# Patient Record
Sex: FEMALE | Race: WHITE | NOT HISPANIC OR LATINO | Employment: OTHER | URBAN - NONMETROPOLITAN AREA
[De-identification: names, ages, dates, MRNs, and addresses within clinical notes are randomized per-mention and may not be internally consistent; named-entity substitution may affect disease eponyms.]

---

## 2017-04-03 ENCOUNTER — AMBULATORY - GICH (OUTPATIENT)
Dept: INTERNAL MEDICINE | Facility: OTHER | Age: 62
End: 2017-04-03

## 2017-04-03 ENCOUNTER — COMMUNICATION - GICH (OUTPATIENT)
Dept: INTERNAL MEDICINE | Facility: OTHER | Age: 62
End: 2017-04-03

## 2017-05-08 ENCOUNTER — OFFICE VISIT - GICH (OUTPATIENT)
Dept: INTERNAL MEDICINE | Facility: OTHER | Age: 62
End: 2017-05-08

## 2017-05-08 ENCOUNTER — HISTORY (OUTPATIENT)
Dept: INTERNAL MEDICINE | Facility: OTHER | Age: 62
End: 2017-05-08

## 2017-05-08 DIAGNOSIS — K59.00 CONSTIPATION: ICD-10-CM

## 2017-05-08 DIAGNOSIS — F41.0 PANIC DISORDER WITHOUT AGORAPHOBIA: ICD-10-CM

## 2017-05-08 DIAGNOSIS — R22.32 LOCALIZED SWELLING, MASS AND LUMP, LEFT UPPER LIMB: ICD-10-CM

## 2017-05-08 DIAGNOSIS — K64.9 HEMORRHOIDS: ICD-10-CM

## 2017-05-08 DIAGNOSIS — K21.9 GASTRO-ESOPHAGEAL REFLUX DISEASE WITHOUT ESOPHAGITIS: ICD-10-CM

## 2017-05-08 DIAGNOSIS — R22.31 LOCALIZED SWELLING, MASS AND LUMP, RIGHT UPPER LIMB: ICD-10-CM

## 2017-05-08 DIAGNOSIS — G47.00 INSOMNIA: ICD-10-CM

## 2017-06-01 ENCOUNTER — AMBULATORY - GICH (OUTPATIENT)
Dept: SURGERY | Facility: OTHER | Age: 62
End: 2017-06-01

## 2017-06-01 ENCOUNTER — HISTORY (OUTPATIENT)
Dept: SURGERY | Facility: OTHER | Age: 62
End: 2017-06-01

## 2017-06-01 DIAGNOSIS — D17.20 BENIGN LIPOMATOUS NEOPLASM OF SKIN AND SUBCUTANEOUS TISSUE OF EXTREMITY: ICD-10-CM

## 2017-06-01 DIAGNOSIS — R22.31 LOCALIZED SWELLING, MASS AND LUMP, RIGHT UPPER LIMB: ICD-10-CM

## 2017-06-01 DIAGNOSIS — R22.32 LOCALIZED SWELLING, MASS AND LUMP, LEFT UPPER LIMB: ICD-10-CM

## 2017-06-15 ENCOUNTER — HISTORY (OUTPATIENT)
Dept: SURGERY | Facility: OTHER | Age: 62
End: 2017-06-15

## 2017-06-15 ENCOUNTER — OFFICE VISIT - GICH (OUTPATIENT)
Dept: SURGERY | Facility: OTHER | Age: 62
End: 2017-06-15

## 2017-06-15 DIAGNOSIS — D17.20 BENIGN LIPOMATOUS NEOPLASM OF SKIN AND SUBCUTANEOUS TISSUE OF EXTREMITY: ICD-10-CM

## 2017-11-03 ENCOUNTER — HISTORY (OUTPATIENT)
Dept: INTERNAL MEDICINE | Facility: OTHER | Age: 62
End: 2017-11-03

## 2017-11-03 ENCOUNTER — HISTORY (OUTPATIENT)
Dept: RADIOLOGY | Facility: OTHER | Age: 62
End: 2017-11-03

## 2017-11-03 ENCOUNTER — HOSPITAL ENCOUNTER (OUTPATIENT)
Dept: RADIOLOGY | Facility: OTHER | Age: 62
End: 2017-11-03
Attending: INTERNAL MEDICINE

## 2017-11-03 ENCOUNTER — OFFICE VISIT - GICH (OUTPATIENT)
Dept: INTERNAL MEDICINE | Facility: OTHER | Age: 62
End: 2017-11-03

## 2017-11-03 DIAGNOSIS — E78.5 HYPERLIPIDEMIA: ICD-10-CM

## 2017-11-03 DIAGNOSIS — K21.9 GASTRO-ESOPHAGEAL REFLUX DISEASE WITHOUT ESOPHAGITIS: ICD-10-CM

## 2017-11-03 DIAGNOSIS — E03.9 HYPOTHYROIDISM: ICD-10-CM

## 2017-11-03 DIAGNOSIS — N95.1 FEMALE CLIMACTERIC STATE: ICD-10-CM

## 2017-11-03 DIAGNOSIS — Z23 ENCOUNTER FOR IMMUNIZATION: ICD-10-CM

## 2017-11-03 DIAGNOSIS — M25.561 PAIN IN RIGHT KNEE: ICD-10-CM

## 2017-11-03 DIAGNOSIS — F41.0 PANIC DISORDER WITHOUT AGORAPHOBIA: ICD-10-CM

## 2017-11-03 DIAGNOSIS — Z12.31 ENCOUNTER FOR SCREENING MAMMOGRAM FOR MALIGNANT NEOPLASM OF BREAST: ICD-10-CM

## 2017-11-03 DIAGNOSIS — Z79.899 OTHER LONG TERM (CURRENT) DRUG THERAPY: ICD-10-CM

## 2017-11-03 LAB
A/G RATIO - HISTORICAL: 1.5 (ref 1–2)
ALBUMIN SERPL-MCNC: 4.3 G/DL (ref 3.5–5.7)
ALP SERPL-CCNC: 58 IU/L (ref 34–104)
ALT (SGPT) - HISTORICAL: 29 IU/L (ref 7–52)
ANION GAP - HISTORICAL: 11 (ref 5–18)
AST SERPL-CCNC: 23 IU/L (ref 13–39)
BILIRUB SERPL-MCNC: 0.4 MG/DL (ref 0.3–1)
BUN SERPL-MCNC: 9 MG/DL (ref 7–25)
BUN/CREAT RATIO - HISTORICAL: 14
CALCIUM SERPL-MCNC: 9.5 MG/DL (ref 8.6–10.3)
CHLORIDE SERPLBLD-SCNC: 100 MMOL/L (ref 98–107)
CHOL/HDL RATIO - HISTORICAL: 3.94
CHOLESTEROL TOTAL: 209 MG/DL
CO2 SERPL-SCNC: 27 MMOL/L (ref 21–31)
CREAT SERPL-MCNC: 0.65 MG/DL (ref 0.7–1.3)
GFR IF NOT AFRICAN AMERICAN - HISTORICAL: >60 ML/MIN/1.73M2
GLOBULIN - HISTORICAL: 2.9 G/DL (ref 2–3.7)
GLUCOSE SERPL-MCNC: 86 MG/DL (ref 70–105)
HDLC SERPL-MCNC: 53 MG/DL (ref 23–92)
LDLC SERPL CALC-MCNC: 144 MG/DL
NON-HDL CHOLESTEROL - HISTORICAL: 156 MG/DL
POTASSIUM SERPL-SCNC: 3.8 MMOL/L (ref 3.5–5.1)
PROT SERPL-MCNC: 7.2 G/DL (ref 6.4–8.9)
PROVIDER ORDERDED STATUS - HISTORICAL: ABNORMAL
SODIUM SERPL-SCNC: 138 MMOL/L (ref 133–143)
TRIGL SERPL-MCNC: 61 MG/DL
TSH - HISTORICAL: 3.54 UIU/ML (ref 0.34–5.6)

## 2017-11-03 ASSESSMENT — ANXIETY QUESTIONNAIRES
2. NOT BEING ABLE TO STOP OR CONTROL WORRYING: NOT AT ALL
GAD7 TOTAL SCORE: 1
4. TROUBLE RELAXING: SEVERAL DAYS
5. BEING SO RESTLESS THAT IT IS HARD TO SIT STILL: NOT AT ALL
1. FEELING NERVOUS, ANXIOUS, OR ON EDGE: NOT AT ALL
6. BECOMING EASILY ANNOYED OR IRRITABLE: NOT AT ALL
7. FEELING AFRAID AS IF SOMETHING AWFUL MIGHT HAPPEN: NOT AT ALL
3. WORRYING TOO MUCH ABOUT DIFFERENT THINGS: NOT AT ALL

## 2017-11-03 ASSESSMENT — PATIENT HEALTH QUESTIONNAIRE - PHQ9: SUM OF ALL RESPONSES TO PHQ QUESTIONS 1-9: 2

## 2017-12-27 NOTE — PROGRESS NOTES
Patient Information     Patient Name MRN Shirley Randhawa [id number] Female 1955      Progress Notes by Sonia Mitchell DO at 11/3/2017  8:20 AM     Author:  Sonia Mitchell DO Service:  (none) Author Type:  PHYS- Osteopathic     Filed:  2017 12:04 PM Encounter Date:  11/3/2017 Status:  Signed     :  Sonia Mitchell DO (PHYS- Osteopathic)            Please see H&P from same date.

## 2017-12-28 NOTE — PROGRESS NOTES
Patient Information     Patient Name MRN Sex Shirley Reis [id number] Female 1955      Progress Notes by Fabby Terry DO at 2017  9:30 AM     Author:  Fabby Terry DO Service:  (none) Author Type:  PHYS- Osteopathic     Filed:  2017 12:16 PM Encounter Date:  2017 Status:  Signed     :  Fabby Terry DO (PHYS- Osteopathic)            Clinic Procedure Note      PMx, PSx, and social Hx are reviewed and updated in [facility]    Current Outpatient Prescriptions on File Prior to Visit       Medication  Sig Dispense Refill     Biotin 10 mg tablet Take 10,000 mcg by mouth.       bisacodyl (DULCOLAX, BISACODYL,) 10 mg suppository Insert 1 Suppository rectally once daily if needed for Other (Specify). 30 Suppository 1     Cholecalciferol, Vitamin D3, 400 unit capsule Take 1,000 Units by mouth.       clonazePAM (KLONOPIN) 0.5 mg tablet Take 2 tablets by mouth at bedtime. Taper down as needed 60 tablet 2     dibucaine 1% topical (NUPERCAINAL) 1 % ointment Apply  topically to affected area(s) 4 times daily if needed for Pain. 28 g 0     estradiol (ESTRACE) 0.5 mg tablet Take 1/2 tablet every other day. 45 tablet 4     levothyroxine (SYNTHROID) 100 mcg tablet Take 1 tablet by mouth once daily. 90 tablet 3     OMEGA-3 FATTY ACIDS/FISH OIL (OMEGA 3 FISH OIL ORAL) Take 1 g by mouth.       omeprazole (PRILOSEC) 20 mg Delayed-Release capsule Take 1 capsule by mouth once daily. Dose reduction 90 capsule 3     No current facility-administered medications on file prior to visit.        Allergies      Allergen   Reactions     Other [Unlisted Allergen (Include Detail In Comments)]  Headache     SSRIs cause RCVS (reversible cerebral vasoconstriction syndrome)      Trazodone  Headache and Hypertension     RCVS          No visits with results within 90 Day(s) from this visit.  Latest known visit with results is:    Office Visit on 2016      Component  Date Value     WHITE BLOOD COUNT          09/28/2016 4.6      RED BLOOD COUNT           09/28/2016 4.52      HEMOGLOBIN                09/28/2016 14.5      HEMATOCRIT                09/28/2016 42.8      MCV                       09/28/2016 95      MCH                       09/28/2016 32.0      MCHC                      09/28/2016 33.8      RDW                       09/28/2016 11.2*     PLATELET COUNT            09/28/2016 233      MPV                       09/28/2016 8.6      SODIUM 09/28/2016 140      POTASSIUM 09/28/2016 3.8      CHLORIDE 09/28/2016 102      CO2,TOTAL 09/28/2016 28      ANION GAP 09/28/2016 10      GLUCOSE 09/28/2016 89      CALCIUM 09/28/2016 9.3      BUN 09/28/2016 12      CREATININE 09/28/2016 0.66*     BUN/CREAT RATIO           09/28/2016 18      GFR if  09/28/2016 >60      GFR if not  [employer]* 09/28/2016 >60      ALBUMIN 09/28/2016 4.4      PROTEIN,TOTAL 09/28/2016 7.2      GLOBULIN                  09/28/2016 2.8      A/G RATIO 09/28/2016 1.6      BILIRUBIN,TOTAL 09/28/2016 0.4      ALK PHOSPHATASE 09/28/2016 61      ALT (SGPT) 09/28/2016 26      AST (SGOT) 09/28/2016 19      CHOLESTEROL,TOTAL 09/28/2016 242*     TRIGLYCERIDES 09/28/2016 145      HDL CHOLESTEROL 09/28/2016 48      NON-HDL CHOLESTEROL 09/28/2016 194*     CHOL/HDL RATIO            09/28/2016 5.04*     LDL CHOLESTEROL 09/28/2016 165*     PATIENT STATUS            09/28/2016 NOT GIVEN      TSH 09/28/2016 1.85      MAGNESIUM 09/28/2016 2.2          Any imagining associated with this vist was reviewed.      Indication  Shirley Hollins is seen at the request of the RACHEL LEUNG DO.  Shirley Hollins  presents for lesion removal. We have discussed this procedure, including option  of not performing surgery, technique of surgery and potential for  bleeding/infection/scarring/need for removal of more tissue and post-procedural care.  Patient is able to do post procedural dressing changes and understands what is  expected.    OBJECTIVE:    Shirley Hollins  appears well. Vitals are normal. The patient has no allergies to lidocaine or  suture material. The patient not on any blood thinners.  Informed consent was obtained prior to beginning the procedure. The area was marked  and doubled checked/ID ed with the pt. PAUSE for the CAUSE completed. The risks of  lesion removal include but are not limited to: bleeding, infection, scarring, reoccurrence,  and the need for removal of more tissue, and complications of anesthesia.    Location  x2 lesions on left forearm- 5mm anterior and 0.75mmpost.  Appear to be lipomas.  Are painful.  Patient denies trauma or history of lipomas.  x3 on right forearm- adjacent to elbow. All are 5mm or less   No signs of infection on either arm.         Procedural Sedation  1% lidocaine w/ Epinephrine  3cc/per lesion     Technique  Patient appears well. Vitals are normal. The patient has no allergies to lidocaine or  suture material. The patient not on any blood thinners.  Shirley Hollins has no history of keloids or problems with healing in the past.    Skin: see HPI    Informed consent was obtained prior to beginning the procedure. The area was marked  and doubled checked/ID ed with the pt. PAUSE for the CAUSE completed. The risks of  lesion removal include but are not limited to: bleeding, infection, scarring, reoccurrence,  and the need for removal of more tissue, and complications of anesthesia.  After informed consent was obtained, using Chloroprep for cleansing and 1%   Lidocaine w/ epi for anesthetic; using sterile technique, PROCEDURE:excision x4  was performed.    x2 lesions on left forearm- 5mm anterior and 0.75mmpost.  Appear to be lipomas.  Are painful.  Patient denies trauma or history of lipomas.  x3 on right forearm- adjacent to elbow. All are 5mm or less   No signs of infection on either arm.     The  insicions are the size of the lesions.  We got x2 lesions out of 1 incision near the elbow.  All are closed w/ 4-0 prolene.   All  appear to be simple lipomas.  They are sent for pathology evaluation.          An Antibiotic salve and steriledressing is applied, and wound care instructions provided. The procedure was well tolerated without complications.    Plan:  The patient will follow up in 7- 10  days for suture removal and review of pathology. If thereis any bleeding/redness/drainage/swelling/pain or tenderness- call the clinic. Patient  was given instructions in wound care, acivity, warning signs, appointment for follow up.  If the patient has any questions or concerns, should call our clinic or go to ER/urgent  care after hours. Patient expressed understanding in directions for care, and was given a  written copy of instructions.    -patient may develop more in the future.  If they are painful/bothersome can have them removed.      Rx=see EPIC    Pt tolerated the procedure well without complications  Patient was given instruction in activity restrictions, wound care and dressing changes. Medication usage, diet, warning signs to look for (and what to do if they occur), and an appointment for follow-up.      Caring for Your Incision      You ll need to help care for your incision after surgery and certain medical procedures. To close an incision, your healthcare provider used stitches (sutures), special strips of surgical tape called Steri-Strips, surgical staples, or surgical skin glue. Follow the tips on this sheet to help stop bleeding, speed healing, and prevent infection of your incision.      Types of incision closures    Surgical stitches (sutures) are placed by sewing the edges of an incision together with surgical thread. Sutures are either absorbable or non-absorbable. Absorbable sutures break down in the body over time. Non-absorbable sutures need to be removed.    Home care  Always wash your hands before touching your incision.  Keep the incision clean, dry, and out of water, keep the incision out of water.  Do not to pick at  the scabs. Scabs help protect the wound.  You can take a shower in 24 hours and wash the incision with soap and water. Pat dry/don t scrub. It s OK to wash around the incision. But don t spray water directly on it.  Pat stitches dry if they get wet. Don't rub.  Check the incision site daily for pain, redness, drainage, swelling, or separation of the incision edges.  If there is a bandage (dressing) over the incision, leave the dressing in place until you are told to remove it or change it. Using clean hands change the dressing as directed by your healthcare provider. Always wash your hands before changing your dressing.  Make sure any clothing that touches the incision is loose-fitting. This will prevent rubbing.   Try to avoid from rough play, contact sports, or physical activities. This can put you at risk of opening the incision.  Make sure you avoid doing things that could cause dirt or sweat to get in or on the incision.  As your incision heals, the skin may appear pink or red. It may also feel slightly bumpy or raised. This is called a healing ridge. Over time, the color should fade and the raised skin will become less noticeable.    Care for specific closures  Follow these guidelines unless your healthcare provider tells you otherwise:  Sutures or staples. Once you no longer need to keep these dry, clean the incision or wound daily. First remove the bandage using clean hands. Then wash the area gently with soap and warm water. Use a wet cotton swab to loosen and remove any blood or crust that forms. After cleaning, put a thin layer of antibiotic ointment on. Then put on a new bandage.      Pain Control    Use ice!  Ice keeps the swelling down and swelling is what causes pain.  Never apply ice directly to the skin.  Wrap it in a towel or cloth.  Apply ice 20 minutes on and 20 minutes off for pain control.  Use as needed.    Take tylenol 325 mg by mouth with food every 4 hours as needed for pain. Or ibuprofen  400 mg by mouth with food every 4 hours as needed for pain.  Do not take tylenol if you have a history of heavy drinking , hepatits C or liver problems.  Do not take ibuprofen if you have a history of stomach ulcers/problems, bleeding problem or kidney issues.           Follow-up care      Follow up with your healthcare provider to ask how long sutures or staples should be left in place. Be sure to return for suture or staple removal as directed. If dissolving stitches were used in your mouth, these will not need to be removed. They should fall out or dissolve on their own.  If tape closures were used, remove them yourself when your healthcare provider tells you to if they have not fallen off on their own. If skin glue was used to close your incision, the glue will wear off by itself.    When to seek medical care  Call your healthcare provider right away if you has any of these:  More pain, redness, swelling, bleeding, or foul-smelling discharge around the incision area  Fever of 101 F (38.3 C) or higher, or as directed by your  healthcare provider  Shaking chills  Vomiting or nausea that doesn t go away  Numbness, coldness, or tingling around the incision area, or changes in skin color  Opening of the sutures or wound  Stitches or staples come apart or fall out or surgical tape falls off before 7 days, or as directed by your healthcare provider

## 2017-12-28 NOTE — PATIENT INSTRUCTIONS
Patient Information     Patient Name MRShirley Templeton [id number] Female 1955      Patient Instructions by Sonia Mitchell DO at 11/3/2017  8:20 AM     Author:  Sonia Mitchell DO  Service:  (none) Author Type:  PHYS- Osteopathic     Filed:  11/3/2017  9:02 AM  Encounter Date:  11/3/2017 Status:  Addendum     :  Sonia Mitchell DO (PHYS- Osteopathic)        Related Notes: Original Note by Sonia Mitchell DO (PHYS- Osteopathic) filed at 11/3/2017  8:51 AM            Ice every 3-4 hours, gentle exercise/rest as much as possible.    Caution with NSAIDS (ibuprofen, aspirin, naproxen, aleve, advil) due to risk for increased blood pressure, stomach pain/nausea/ulcers and kidney damage; use minimal amount necessary    -- in addition to --     Tylenol 1000mg (2- extra strength 500mg tablets or 3 regular strength 325mg tablets) three times a day; Maximum of 4000mg daily    - Return/call as needed for follow-up should any new symptoms develop, for worsening of current symptoms or if symptoms do not resolve with above plan.    ------------------------------------------------------------------------------------------------------------    Start Ranitidine daily and only use omeprazole as needed.      ---------------------------------------------------------------------       Index Afghan   Hamstring Strain Exercises   Your healthcare provider may recommend exercises to help you heal. Talk to your healthcare provider or physical therapist about which exercises will best help you and how to do them correctly and safely.  You may start the first 4 exercises right away. Make sure you do not feel any sharp pain. You should feel only a mild discomfort in the back of your thigh when you are doing these exercises.    Standing hamstring stretch: Put the heel of the leg on your injured side on a stool about 15 inches high. Keep your leg straight. Lean forward, bending at the hips, until you feel a mild stretch in the  back of your thigh. Make sure you don't roll your shoulders or bend at the waist when doing this or you will stretch your lower back instead of your leg. Hold the stretch for 15 to 30 seconds. Repeat 3 times.    Hamstring stretch on wall: Lie on your back with your buttocks close to a doorway. Stretch your uninjured leg straight out in front of you on the floor through the doorway. Raise your injured leg and rest it against the wall next to the door frame. Keep your leg as straight as possible. You should feel a stretch in the back of your thigh. Hold this position for 15 to 30 seconds. Repeat 3 times.    Slump stretch: Sit slouched in a chair with your head bent down. Straighten your injured leg and move your foot toward you. Hold this position for 30 seconds. Relax and then repeat 2 times.    Prone knee bend: Lie on your stomach with your legs straight out behind you. Bend the knee on your injured side so that your heel comes toward your buttocks. Hold 5 seconds. Relax and return your foot to the floor. Do 2 sets of 15. As this gets easier you can add weights to your ankle.  When the pain is gone, start strengthening your hamstrings using the following exercises.    Prone hip extension: Lie on your stomach with your legs straight out behind you. Fold your arms under your head and rest your head on your arms. Draw your belly button in towards your spine and tighten your abdominal muscles. Tighten the buttocks and thigh muscles of the leg on your injured side and lift the leg off the floor about 8 inches. Keep your leg straight. Hold for 5 seconds. Then lower your leg and relax. Do 2 sets of 15.    Prone plank with hip extension: Lie on your stomach resting on your forearms. With your legs straight, lift your hips off the floor until they are in line with your shoulders. Hold this position and raise your injured leg toward the ceiling. Try to keep your knee straight while you do this. Do 3 sets of 10.    Resisted  hamstring curl: Place a chair facing a door about 3 feet from the door. Loop and tie one end of the tubing around the ankle of your injured leg. Tie a knot in the other end of the tubing and shut the knot in the door. Sit in the chair and raise your injured leg. Then bend your knee, bringing your foot down to the floor. Allow your foot to slide along the floor and move back underneath the chair, stretching the tubing. Slowly let your foot slide forward again. Do 2 sets of 15.  You can challenge yourself by moving the chair farther from the door to increase the resistance of the tubing.    Chair lift: Lie on your back with your heels resting on the top of a chair. Slowly raise both hips off the floor. Hold for 2 seconds and lower slowly. Do 3 sets of 15.  After your hamstrings have become stronger and you feel your leg is stable, you can begin strengthening the quadriceps (the muscles in the front of the thigh) by doing lunges.    Lunge: Stand and take a large step forward with your right leg. Dip your left knee down toward the floor and bend your right leg. Return to the starting position. Repeat the exercise stepping forward with the left leg and dipping your right leg down toward the floor. Do 2 sets of 8 to 12 on each side. When this gets easy, you can do this exercise with small weights in your hands.  Developed by [employer].  Adult Advisor 2016.3 published by [employer].  Last modified: 2014-06-09  Last reviewed: 2015-05-21  This content is reviewed periodically and is subject to change as new health information becomes available. The information is intended to inform and educate and is not a replacement for medical evaluation, advice, diagnosis or treatment by a healthcare professional.  References   Adult Advisor 2016.3 Index    Copyright   2016 [employer], a division of [employer] All rights reserved.

## 2017-12-28 NOTE — PROGRESS NOTES
Patient Information     Patient Name MRN Sex Shirley Reis [id number] Female 1955      Progress Notes by Fabby Terry DO at 6/15/2017 10:00 AM     Author:  Fabby Terry DO Service:  (none) Author Type:  PHYS- Osteopathic     Filed:  6/15/2017 10:15 AM Encounter Date:  6/15/2017 Status:  Signed     :  Fabby Terry DO (PHYS- Osteopathic)            Patient is doing well post-op from there recent lipoma removal x4 .   She has been having no nausea or vomiting; no chest pain, shortness of breath or coughing up anything. Patient has been urinating without any difficulties and moving bowel w/ no straining or bleeding. Patient has no calf pain swelling, tenderness, or redness. Patient has been sleeping at night with no problems. Patient has been ambulating without difficulty. Patient has been able to tolerate a regular diet. Patient has had good relief with previously prescribed pain meds.      /70  Pulse 64  Wt 66.2 kg (146 lb)  Breastfeeding? No  BMI 24.3 kg/m2    HEENT: all negative  No jaundice.  No eye redness or pain.  No throat pain.  L: CTA b/l  Abdom: + BS. no distensions.  LOWER EXTREMITY: no swelling or calf pain  The incision(s) is clean dry and intact without redness, drainage or swelling or bleeding.   ?  Pathology: see Epic  ?  Pt doing well; with no complaints. Reviewed path report. Incision(s): Clean/dry/intact and healing nicely. Removed sutures and placed steri's.    Pt should keep the area clean and dry: wash with plain soap and water. Keep covered. Does not need to put anything on the lesions. May use abx ointment if desires. Avoid lifting Over 5 #'s x1 week. No strenuous activity or hot tubs/sauna's/pool/lake x1 week. Ice and NSAID's for pain. Monitor for redness/drainage/swelling/increase in pain/temp > 101. May have serous drainage/should not be purulent. Call Clinic if these occur.  As far as heeling: may be red and firm for about 1-3 weeks. This is the  normal heeling process and will smooth out to a silvery/white line. Avoid sun exposure X1 year. May take months for redness to dissipate. If is sun burnt, will stay red. Can use vit E oil.  Today we discussed wound care, activities, return to work, warnings signs. Pt is doing well.      Patient Active Problem List     Diagnosis  Code     GERD K21.9     H/O adenomatous polyp of colon Z86.010     Skin lesion L98.9     Hypothyroidism E03.9     Hyperlipidemia E78.5     Panic anxiety syndrome F41.0     Health care maintenance Z00.00     Sebaceous cyst L72.3         Call the office if have any questions or concern's.  F/u with PCP for routine medical care.  Byers not require further pain med's.  Will F/U :megha Terry, DO

## 2017-12-28 NOTE — PROGRESS NOTES
Patient Information     Patient Name MRN Shirley Randhawa [id number] Female 1955      Progress Notes by Susan Ferguson at 11/3/2017  9:39 AM     Author:  Susan Ferguson Service:  (none) Author Type:  (none)     Filed:  11/3/2017  9:39 AM Date of Service:  11/3/2017  9:39 AM Status:  Signed     :  Susan Ferguson            Falls Risk Criteria:    Age 65 and older or under age 4        Sensory deficits    Poor vision    Use of ambulatory aides    Impaired judgment    Unable to walk independently    Meets High Risk criteria for falls:  no

## 2017-12-29 NOTE — PATIENT INSTRUCTIONS
Patient Information     Patient Name MRN Sex Shirley Reis [id number] Female 1955      Patient Instructions by Fabby Terry DO at 6/15/2017 10:00 AM     Author:  Fabby Terry DO Service:  (none) Author Type:  PHYS- Osteopathic     Filed:  6/15/2017 10:13 AM Encounter Date:  6/15/2017 Status:  Signed     :  Fabby Terry DO (PHYS- Osteopathic)            Pt should keep the area clean and dry: wash with plain soap and water. Keep covered. Does not need to put anything on the lesions. May use abx ointment if desires. Avoid lifting Over 5 #'s x1 week. No strenuous activity or hot tubs/sauna's/pool/lake x1 week. Ice and NSAID's for pain. Monitor for redness/drainage/swelling/increase in pain/temp > 101. May have serous drainage/should not be purulent. Call Clinic if these occur.  As far as heeling: may be red and firm for about 1-3 weeks. This is the normal heeling process and will smooth out to a silvery/white line. Avoid sun exposure X1 year. May take months for redness to dissipate. If is sun burnt, will stay red. Can use vit E oil.  Today we discussed wound care, activities, return to work, warnings signs. Pt is doing well.

## 2017-12-29 NOTE — PATIENT INSTRUCTIONS
Patient Information     Patient Name MRN Sex Shirley Reis [id number] Female 1955      Patient Instructions by Fabby Terry DO at 2017  9:30 AM     Author:  Fabby Terry DO Service:  (none) Author Type:  PHYS- Osteopathic     Filed:  2017 10:18 AM Encounter Date:  2017 Status:  Signed     :  Fabby Terry DO (PHYS- Osteopathic)            Caring for Your Incision      You ll need to help care for your incision after surgery and certain medical procedures. To close an incision, your healthcare provider used stitches (sutures), special strips of surgical tape called Steri-Strips, surgical staples, or surgical skin glue. Follow the tips on this sheet to help stop bleeding, speed healing, and prevent infection of your incision.      Pain Control    Use ice!  Ice keeps the swelling down and swelling is what causes pain.  Never apply ice directly to the skin.  Wrap it in a towel or cloth.  Apply ice 20 minutes on and 20 minutes off for pain control.  Use as needed.    Take tylenol 325 mg by mouth with food every 4 hours as needed for pain. Or ibuprofen 400 mg by mouth with food every 4 hours as needed for pain.  Do not take tylenol if you have a history of heavy drinking , hepatits C or liver problems.  Do not take ibuprofen if you have a history of stomach ulcers/problems, bleeding problem or kidney issues.     Types of incision closures    Surgical stitches (sutures) are placed by sewing the edges of an incision together with surgical thread. Sutures are either absorbable or non-absorbable. Absorbable sutures break down in the body over time. Non-absorbable sutures need to be removed.    Home care  Always wash your hands before touching your incision.  Keep the incision clean, dry, and out of water, keep the incision out of water.  Do not to pick at the scabs. Scabs help protect the wound.  You can take a shower in 24 hours and wash the incision with soap and water. Pat  dry/don t scrub. It s OK to wash around the incision. But don t spray water directly on it.  Pat stitches dry if they get wet. Don't rub.  Check the incision site daily for pain, redness, drainage, swelling, or separation of the incision edges.  If there is a bandage (dressing) over the incision, change this every 24 hours as instructed by your provider. Using clean hands change the dressing as directed by your healthcare provider. Always wash your hands before changing your dressing.  Make sure any clothing that touches the incision is loose-fitting. This will prevent rubbing. If the incision is on the head, keep your child from wearing caps or other head coverings. These may rub against the incision.  Try to avoid from rough play, contact sports, or physical activities for two weeks. This can put you at risk of opening the incision.  Make sure you avoid doing things that could cause dirt or sweat to get in or on the incision.  As your incision heals, the skin may appear pink or red. It may also feel slightly bumpy or raised. This is called a healing ridge. Over time, the color should fade and the raised skin will become less noticeable.    Care for specific closures  :  Sutures or staples. Once you no longer need to keep these dry, clean the incision or wound daily, generally after the first 24 hours.  First remove the bandage using clean hands. Then wash the area gently with soap and warm water. Use a wet cotton swab to loosen and remove any blood or crust that forms. After cleaning, put a thin layer of antibiotic ointment on. Then put on a new bandage.      Follow-up care  Staples or sutures generally need to be removed in 10 days. .   Be sure to return for suture or staple removal as directed. If dissolving stitches were used in your mouth, these will not need to be removed. They should fall out or dissolve on their own.  If tape closures were used, remove them yourself when your healthcare provider tells you  to if they have not fallen off on their own.     When to seek medical care  Call your healthcare provider right away if you have any of these:  More pain, redness, swelling, bleeding, or foul-smelling discharge around the incision area  Fever of 101 F (38.3 C) or higher, or as directed by your child's healthcare provider  Shaking chills  Vomiting or nausea that doesn t go away  Numbness, coldness, or tingling around the incision area, or changes in skin color  Opening of the sutures or wound  Stitches or staples come apart or fall out or surgical tape falls off before 7 days, or as directed by your healthcare provider

## 2017-12-29 NOTE — H&P
Patient Information     Patient Name MRN Shirley Randhawa [id number] Female 1955      H&P by Sonia Mitchell DO at 11/3/2017  8:20 AM     Author:  Sonia Mitchell DO Service:  (none) Author Type:  PHYS- Osteopathic     Filed:  2017 12:04 PM Encounter Date:  11/3/2017 Status:  Signed     :  Sonia Mitchell DO (PHYS- Osteopathic)            Chief Complaint     Patient presents with       Physical      annual exam       HPI: Ms. Hollins is a 61 y.o. female who presents today to [facility].  She overall is feeling good.  She did retire in the summer and since then has been doing well.  She has been losing weight and walking regularly.  She has improved her diet.    Her only concern today is that she has been having some right knee pain.  This started several weeks ago.  It does not bother her when her she walks however does seem to be worse when she goes down stairs.  She has not had any swelling.  She has tried some ibuprofen off and on however not consistently.  She has never had pain before like this.  She does indicate that the pain is on the posterior medial side of the knee.  She did see a massage therapist and felt that this was beneficial.    In reviewing her medical issues,    (F41.0) Panic anxiety syndrome  - she has been able to taper down on her clonazepam.  She is taking 1 daily at bedtime with occasional ones for breakthrough.  She denies any side effects from the medication.    (N95.1) Menopausal symptoms - she does continue on the estrogen every other day.  She is aware of the risk of this medication.  She does typically have significant menopausal symptoms when off of the estrogen.    (E03.9) Hypothyroidism, unspecified type - she is due for a repeat TSH today.  She denies any symptoms of hyper or hypothyroidism.    (K21.9) Gastroesophageal reflux disease, esophagitis presence not specified - she has been tapering off of her omeprazole.  She has been taking it every other day  at this time.  If she did initially have some reflux symptoms however those have resolved at this time.  She does take Zantac on the opposite days and feels it has been controlling her symptoms recently.    (E78.5) Hyperlipidemia, unspecified hyperlipidemia type - lipids have traditionally been elevated however her overall risk is low.  She would like fasting lipids rechecked today.    She is post menopausal.  She is due for mammogram and has been scheduled today.  She is up-to-date on her colonoscopy and vaccines.  She would like her flu vaccine today.    History is discussed on 11/3/2017 with patient and reviewed in previous available records by myself.  It is current to the best of my knowledge as below.    Past Medical History:     Diagnosis  Date     Diverticular disease      Ectopic pregnancy      Elevated LFTs     with INH, resolved      Ganglion cyst 2010    Ruptured after a fall       GERD      Headache syndrome, complicated     Reversible cerebral vasoconstriction syndrome  Trazodone      History of positive PPD     Normal CXR; treated with INH, incomplete treatment but mostly finished      Hyperlipidemia      Hypothyroidism      Lipoma of upper extremity 6/15/2017     Other bursal cyst, left hip 2016     Panic disorder      Seborrheic dermatitis 2015     Surgical menopause     Secondary to endometriosis          Past Surgical History:      Procedure  Laterality Date     BURSECTOMY AND REMOVAL OF SYNOVIAL CYST Left 02/15/2016    L hip/trochanteric bursa. [facility]        SECTION       COLONOSCOPY SCREENING  10/28/2013    Mild diverticulosis - follow up 10 years       ESOPHAGOGASTRODUODENOSCOPY      gastritis and esophagitis; normal       LAP CHOLECYSTECTOMY       LYSIS OF ADHESIONS       OOPHORECTOMY      right       TONSIL AND ADENOIDECTOMY       TOTAL ABDOMINAL HYSTERECTOMY      with  BSO (one ovary at a time)           Current Outpatient Prescriptions     Medication  Sig     Biotin 10 mg tablet Take 10,000 mcg by mouth.     bisacodyl (DULCOLAX, BISACODYL,) 10 mg suppository Insert 1 Suppository rectally once daily if needed for Other (Specify).     Cholecalciferol, Vitamin D3, 400 unit capsule Take 1,000 Units by mouth.     clonazePAM (KLONOPIN) 0.5 mg tablet Take 1 tablet by mouth at bedtime. Taper down as needed     dibucaine 1% topical (NUPERCAINAL) 1 % ointment Apply  topically to affected area(s) 4 times daily if needed for Pain.     estradiol (ESTRACE) 0.5 mg tablet Take 1/2 tablet every other day.     levothyroxine (SYNTHROID) 100 mcg tablet Take 1 tablet by mouth once daily.     naproxen (NAPROSYN) 500 mg tablet Take 1 tablet by mouth 2 times daily with meals.     OMEGA-3 FATTY ACIDS/FISH OIL (OMEGA 3 FISH OIL ORAL) Take 1 g by mouth.     omeprazole (PRILOSEC) 20 mg Delayed-Release capsule Take 1 capsule by mouth once daily if needed for GI Upset. Dose reduction     ranitidine (ZANTAC) 150 mg tablet Take 1 tablet by mouth once daily.     No current facility-administered medications for this visit.      Medications have been reviewed by me and are current to the best of my knowledge and ability.       Allergies      Allergen   Reactions     Other [Unlisted Allergen (Include Detail In Comments)]  Headache     SSRIs cause RCVS (reversible cerebral vasoconstriction syndrome)      Trazodone  Headache and Hypertension     RCVS         Family History       Problem   Relation Age of Onset     Other  Mother      Cirrhosis, secondary to CASTILLO       Diabetes  Mother      Cancer  Father      Step father, cancer of esophagus       No Known Problems  Sister      No Known Problems  Sister      No Known Problems  Brother      No Known Problems  Brother      Cancer  Other      Lung cancer, both smokers       Cancer  Maternal Aunt      Pancreatic cancer       Thyroid Disease  Maternal Grandmother       Other  Sister      Psoriasis       Other  Brother      Psoriasis       Cancer-breast  No Family History        Family Status     Relation  Status     Mother  at age 69    Cirrhosis liver, secondary to CASTILLO      Father  at age 49    Cancer of esophagus      Maternal Aunt     Pancreatic cancer      Other     Lung cancer; smokers      Sister Alive     Sister Alive     Brother Alive     Brother Alive     Other      Maternal Aunt      Maternal Grandmother      Sister      Brother      No Family History          Social History     Social History        Marital status:       Spouse name: N/A     Number of children:  N/A     Years of education:  N/A     Occupational History        [employer]     Social History Main Topics          Smoking status:   Former Smoker      Packs/day:  0.15      Years:  20.00      Types:  Cigarettes      Quit date:  2005      Smokeless tobacco:   Never Used      Alcohol use   0.0 oz/week     0 Standard drinks or equivalent per week        Comment: socially; couple drinks weekly        Drug use:   No       Comment: no IV drug use       Sexual activity:   Yes      Partners:  Male       Comment: monogamous       Other Topics   Concern      Service  No     Blood Transfusions  No     Caffeine Concern  No     Occupational Exposure  No     Hobby Hazards  No     Sleep Concern  No     Stress Concern  No     Weight Concern  No     Special Diet  No     Back Care  No     Exercise  Yes     6 days a week cardio yoga walking       Bike Helmet  No     n/a      Seat Belt  Yes     100%      Self-Exams  Yes     Social History Narrative      in , remarried .  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        Les Hollins     Two sons - one in [place] and one in [place], [place]          ROS  GEN: -fevers/-chills/-night sweats/+wt change - intentional weight loss over the summer  "  NEURO: -headaches/-vision changes  EARS: -hearing changes/-tinnitus  NOSE: -drainage/-congestion  MOUTH/THROAT: - sore throat/-dysphagia/-sores  LUNGS: -sob/-cough  CARDIOVASCULAR: -cp/-palpitations  GI: -pain/-diarrhea/-constipation/-bloody stools  : -dysuria/-hematuria/-vaginal discharge or bleeding  ENDOCRINE: -skin or hair changes/-hot-cold intolerance - she reports that she has felt warmer since increasing her exercise regimen   HEMATOLOGIC/LYMPHATIC: -swollen nodes/-easy bruising  SKIN: -rashes/-lesions  MSK/RHEUM: +joint pain/-swelling  NEURO: -weakness/-parasthesias  PSYCH: -anhedonia/-depression/+ anxiety but improved from prior        EXAM:   /66  Pulse 60  Temp 97.9  F (36.6  C) (Tympanic)  Resp 18  Ht 1.645 m (5' 4.75\")  Wt 64.4 kg (142 lb)  Breastfeeding? No  BMI 23.81 kg/m2  Estimated body mass index is 23.81 kg/(m^2) as calculated from the following:    Height as of this encounter: 1.645 m (5' 4.75\").    Weight as of this encounter: 64.4 kg (142 lb).   GEN: Vitals reviewed.  Healthy appearing. Patient is in no acute distress. Cooperative with exam.  HEENT: Normocephalic atraumatic.  Normal external eye, conjunctiva, lids, cornea with no scleral icterus or conjunctival erythema. Pupils equally round. Oropharynx with no erythema or exudates. Dentition adequate.  EACs clear bilat, TM gray with normal landmarks.   NECK: Supple; no thyromegaly or masses noted.  No cervical or supraclavicular lymphadenopathy.  CV: Heart regular in rate and rhythm with no murmur.  Radial and posterior tibial pulses palpable.  LUNGS: Lungs clear to auscultation bilaterally.  Chest rise equal bilaterally.  No accessory muscle use.  ABD:  Soft, nontender, and nondistended.  No rebound. Bowel sounds positive.  SKIN: Warm and dry to touch.  No rash on face, arms and legs.  EXT: No clubbing or cyanosis.  No peripheral edema.  NEURO: Alert and oriented to person, place, and time.  Cranial nerves II-XII grossly " intact with no focal or lateralizing deficits.  Muscle tone normal.  Gait normal. No tremor. Sensation intact to light touch.  MSK: ROM of upper and lower ext symmetric and full.  PSYCH: Mood is good. Affect appropriate. Speech fluent. Answers questions appropriately and thought process normal.       ASSESSMENT AND PLAN:    1. Panic anxiety syndrome  - stable at this time.  She will continue with clonazepam at bedtime.  She is provided with approximately 6-7 months of this.  When she is due for refill we will plan to call her and see if she is having any issues with the medication.  If not we can renew for an additional 6 months and see her annually.  - clonazePAM (KLONOPIN) 0.5 mg tablet; Take 1 tablet by mouth at bedtime. Taper down as needed  Dispense: 45 tablet; Refill: 5    2. Menopausal symptoms  - stable, continue with estradiol at this time.  She is encouraged to taper down as able.  - estradiol (ESTRACE) 0.5 mg tablet; Take 1/2 tablet every other day.  Dispense: 45 tablet; Refill: 4    3. Hypothyroidism, unspecified type  - TSH done pending, changes to levothyroxine as indicated  - patient was educated on importance of thyroid hormone and symptoms of hypothyroidism  - instructed to take thyroid medication without food or other meds and before eating or at least 2-3 hours after eating  - to call if any concerns   - levothyroxine (SYNTHROID) 100 mcg tablet; Take 1 tablet by mouth once daily.  Dispense: 90 tablet; Refill: 4  - TSH    4. Gastroesophageal reflux disease, esophagitis presence not specified  - Continue medication as prescribed  - Discussed the need to taper off of PPI when able and discussed alternatives including Tums and H2 blocker - she will continue to do this and only use the omeprazole as needed.  - Encouraged to avoid caffeine, NSAIDS, chocolate, alcohol, spicy food, red sauce; consider raising the head of bed 10-15 degrees; do not eat within 2-3 hours of bedtime  - Return/call as needed  for follow-up should any new symptoms develop, for worsening of current symptoms or if symptoms do not resolve with above plan.  - omeprazole (PRILOSEC) 20 mg Delayed-Release capsule; Take 1 capsule by mouth once daily if needed for GI Upset. Dose reduction    5. Acute pain of right knee  - Ice, elevation, gentle movement/rest as tolerated  - Ibuprofen, Naproxen or Tylenol as needed  - exercises provided and if she does not have improvement with this we will consider PT  - patient is to call if she has additional problems with this or if new symptoms develop  - naproxen (NAPROSYN) 500 mg tablet; Take 1 tablet by mouth 2 times daily with meals.  Dispense: 30 tablet; Refill: 0    6. Hyperlipidemia, unspecified hyperlipidemia type  -  We will recheck today and determine her risk of cardiovascular disease.  If it is elevated above 10% we will consider statin therapy.  - LIPID PANEL    7. Need for influenza vaccination  - FLU VACCINE => 3 YRS PF QUADRIVALENT IIV4 IM    8. High risk medication use  - COMP METABOLIC PANEL        Return in about 1 year (around 11/3/2018) for Annual Exam.       Patient Instructions   Ice every 3-4 hours, gentle exercise/rest as much as possible.    Caution with NSAIDS (ibuprofen, aspirin, naproxen, aleve, advil) due to risk for increased blood pressure, stomach pain/nausea/ulcers and kidney damage; use minimal amount necessary    -- in addition to --     Tylenol 1000mg (2- extra strength 500mg tablets or 3 regular strength 325mg tablets) three times a day; Maximum of 4000mg daily    - Return/call as needed for follow-up should any new symptoms develop, for worsening of current symptoms or if symptoms do not resolve with above plan.    ------------------------------------------------------------------------------------------------------------    Start Ranitidine daily and only use omeprazole as needed.      ---------------------------------------------------------------------       Index  Swedish   Hamstring Strain Exercises   Your healthcare provider may recommend exercises to help you heal. Talk to your healthcare provider or physical therapist about which exercises will best help you and how to do them correctly and safely.  You may start the first 4 exercises right away. Make sure you do not feel any sharp pain. You should feel only a mild discomfort in the back of your thigh when you are doing these exercises.    Standing hamstring stretch: Put the heel of the leg on your injured side on a stool about 15 inches high. Keep your leg straight. Lean forward, bending at the hips, until you feel a mild stretch in the back of your thigh. Make sure you don't roll your shoulders or bend at the waist when doing this or you will stretch your lower back instead of your leg. Hold the stretch for 15 to 30 seconds. Repeat 3 times.    Hamstring stretch on wall: Lie on your back with your buttocks close to a doorway. Stretch your uninjured leg straight out in front of you on the floor through the doorway. Raise your injured leg and rest it against the wall next to the door frame. Keep your leg as straight as possible. You should feel a stretch in the back of your thigh. Hold this position for 15 to 30 seconds. Repeat 3 times.    Slump stretch: Sit slouched in a chair with your head bent down. Straighten your injured leg and move your foot toward you. Hold this position for 30 seconds. Relax and then repeat 2 times.    Prone knee bend: Lie on your stomach with your legs straight out behind you. Bend the knee on your injured side so that your heel comes toward your buttocks. Hold 5 seconds. Relax and return your foot to the floor. Do 2 sets of 15. As this gets easier you can add weights to your ankle.  When the pain is gone, start strengthening your hamstrings using the following exercises.    Prone hip extension: Lie on your stomach with your legs straight out behind you. Fold your arms under your head and rest  your head on your arms. Draw your belly button in towards your spine and tighten your abdominal muscles. Tighten the buttocks and thigh muscles of the leg on your injured side and lift the leg off the floor about 8 inches. Keep your leg straight. Hold for 5 seconds. Then lower your leg and relax. Do 2 sets of 15.    Prone plank with hip extension: Lie on your stomach resting on your forearms. With your legs straight, lift your hips off the floor until they are in line with your shoulders. Hold this position and raise your injured leg toward the ceiling. Try to keep your knee straight while you do this. Do 3 sets of 10.    Resisted hamstring curl: Place a chair facing a door about 3 feet from the door. Loop and tie one end of the tubing around the ankle of your injured leg. Tie a knot in the other end of the tubing and shut the knot in the door. Sit in the chair and raise your injured leg. Then bend your knee, bringing your foot down to the floor. Allow your foot to slide along the floor and move back underneath the chair, stretching the tubing. Slowly let your foot slide forward again. Do 2 sets of 15.  You can challenge yourself by moving the chair farther from the door to increase the resistance of the tubing.    Chair lift: Lie on your back with your heels resting on the top of a chair. Slowly raise both hips off the floor. Hold for 2 seconds and lower slowly. Do 3 sets of 15.  After your hamstrings have become stronger and you feel your leg is stable, you can begin strengthening the quadriceps (the muscles in the front of the thigh) by doing lunges.    Lunge: Stand and take a large step forward with your right leg. Dip your left knee down toward the floor and bend your right leg. Return to the starting position. Repeat the exercise stepping forward with the left leg and dipping your right leg down toward the floor. Do 2 sets of 8 to 12 on each side. When this gets easy, you can do this exercise with small  weights in your hands.  Developed by [employer].  Adult Advisor 2016.3 published by [employer].  Last modified: 2014-06-09  Last reviewed: 2015-05-21  This content is reviewed periodically and is subject to change as new health information becomes available. The information is intended to inform and educate and is not a replacement for medical evaluation, advice, diagnosis or treatment by a healthcare professional.  References   Adult Advisor 2016.3 Index    Copyright   2016 [employer], a division of [employer] All rights reserved.               RACHEL LEUNG DO   11/3/2017 9:09 AM    This document was prepared using voice generated softwear. While every attempt was made for accuracy, grammatical errors may exist.

## 2017-12-30 NOTE — NURSING NOTE
Patient Information     Patient Name MRN Sex Shirley Reis [id number] Female 1955      Nursing Note by Umu Wolfe at 11/3/2017  8:20 AM     Author:  Umu Wolfe Service:  (none) Author Type:  (none)     Filed:  11/3/2017  8:27 AM Encounter Date:  11/3/2017 Status:  Signed     :  Umu Wolfe            Patient presents to clinic for physical and annual exam.    Umu Wolfe LPN..................11/3/2017  8:21 AM

## 2017-12-30 NOTE — NURSING NOTE
Patient Information     Patient Name MRN Shirley Randhawa [id number] Female 1955      Nursing Note by Anahi Rutledge at 6/15/2017 10:00 AM     Author:  Anahi Rutledge Service:  (none) Author Type:  (none)     Filed:  6/15/2017 10:04 AM Encounter Date:  6/15/2017 Status:  Signed     :  Anahi Rutledge            Patient is here today for a suture removal from multiple spots.  Anahi Rutledge LPN.......................... 6/15/2017  10:02 AM

## 2017-12-30 NOTE — NURSING NOTE
Patient Information     Patient Name MRN Shirley Randhawa [id number] Female 1955      Nursing Note by Jade Guillaume at 2017  9:30 AM     Author:  Jade Guillaume Service:  (none) Author Type:  (none)     Filed:  2017  9:33 AM Encounter Date:  2017 Status:  Signed     :  Jade Guillaume            Here today with several lumps.  Jade Guillaume LPN..........2017  9:30 AM

## 2017-12-31 ENCOUNTER — HEALTH MAINTENANCE LETTER (OUTPATIENT)
Age: 62
End: 2017-12-31

## 2018-01-04 NOTE — TELEPHONE ENCOUNTER
Patient Information     Patient Name MRN Shirley Randhawa [id number] Female 1955      Telephone Encounter by Jade Guillaume at 4/3/2017  9:45 AM     Author:  Jade Guillaume Service:  (none) Author Type:  (none)     Filed:  4/3/2017  9:46 AM Encounter Date:  4/3/2017 Status:  Signed     :  Jade Guillaume            Notified patient and she will come in at 3pm today.  Jade Guillaume LPN..........4/3/2017  9:46 AM

## 2018-01-04 NOTE — NURSING NOTE
Patient Information     Patient Name MRN Shirley Randhawa [id number] Female 1955      Nursing Note by Umu Wolfe at 2017 10:00 AM     Author:  Umu Wolfe Service:  (none) Author Type:  (none)     Filed:  2017 10:10 AM Encounter Date:  2017 Status:  Signed     :  Umu Wolfe            Patient presents to clinic to discuss medication management and she has a hemorrhoid that will not go away.    Umu Wolfe LPN..................2017  10:06 AM

## 2018-01-04 NOTE — TELEPHONE ENCOUNTER
Patient Information     Patient Name MRN Shirley Randhawa [id number] Female 1955      Telephone Encounter by Umu Wolfe at 4/3/2017  9:16 AM     Author:  Umu Wolfe Service:  (none) Author Type:  (none)     Filed:  4/3/2017  9:22 AM Encounter Date:  4/3/2017 Status:  Signed     :  Umu Wolfe            Patient states she is experiencing cough, right side headache, nose bleeds, chest congestion, lethargy, decreased appetite and nausea since Friday.  She has tried Muscinex OTC for relief of congestion without it helping.  Please advise.    Umu Wolfe LPN..................4/3/2017  9:21 AM

## 2018-01-04 NOTE — TELEPHONE ENCOUNTER
Patient Information     Patient Name MRN Shirley Randhawa [id number] Female 1955      Telephone Encounter by Sonia Mitchell DO at 4/3/2017  9:28 AM     Author:  Sonia Mitchell DO Service:  (none) Author Type:  PHYS- Osteopathic     Filed:  4/3/2017  9:32 AM Encounter Date:  4/3/2017 Status:  Signed     :  Sonia Mitchell DO (PHYS- Osteopathic)            It is possible she has the flu vs viral upper resp infection.  She is outside the window of treatment for flu however if she wants to be seen today she can come at 3pm for the influenza test and I will see her after.      Otherwise she should call if her symptoms do not improve by Friday.  If she develops any breathing problems, chest pain, severely high fevers greater than 102.5, confusion or vision changes she should come in sooner and likely through the ER.

## 2018-01-04 NOTE — TELEPHONE ENCOUNTER
Patient Information     Patient Name MRN Shirley Randhawa [id number] Female 1955      Telephone Encounter by Bernice Cates at 4/3/2017  8:10 AM     Author:  Bernice Cates Service:  (none) Author Type:  (none)     Filed:  4/3/2017  8:12 AM Encounter Date:  4/3/2017 Status:  Signed     :  Bernice Cates            Patient called stating that she started getting sick on Friday and has heavy chest congestion.  She would like to be seen today.  Will [facility] be willing to work her in?  Please call to discuss and advise.  Bernice Cates ....................  4/3/2017   8:12 AM

## 2018-01-04 NOTE — PATIENT INSTRUCTIONS
Patient Information     Patient Name MRShirley Templeton [id number] Female 1955      Patient Instructions by Sonia Mitchell DO at 2017 10:00 AM     Author:  Sonia Mitchell DO  Service:  (none) Author Type:  PHYS- Osteopathic     Filed:  2017 10:43 AM  Encounter Date:  2017 Status:  Addendum     :  Sonia Mitchell DO (PHYS- Osteopathic)        Related Notes: Original Note by Sonia Mitchell DO (PHYS- Osteopathic) filed at 2017 10:36 AM            Start Ranitidine daily.  Take Omeprazole as you currently are for 1 week and then decrease to 20mg for 1 week and then try stopping all while taking Ranitidine on going.    --------------------------------------------------------------------------------------------------------------------------  - maintain a regular bedtime, routine and wake up time  - include a warm beverage and/or hot bath 1-2 hours before bedtime  - avoid naps  - do not watch the clock  - avoid caffeine, alcohol and nicotine for at least 4-6 hours prior to bed  - exercise daily but avoid exercising right before bed  - no screen time within hour of bed  - keep your bedroom quiet, dark, comfortable and cool  - If you haven t been able to get to sleep after about 20 minutes or more, get up and do something calming or boring until you feel sleepy, then return to bed and try again. Avoid doing anything that is too stimulating or interesting, as this will wake you up even more.  - use bed only for sleep and sex, do not use it as a place to watch TV, eat, read, work on your laptop, pay bills, and other things  - can trial melatonin 1mg - 3mg nightly 2-3 hours before sleep if you have not in the past          --------------------------------------------------------------------------------------------------------------------------------  Polyethylene glycol 17gm mixed with 8+ ounces of water or juice, once or twice daily - softens and moves stools; Can take a couple days to  start working    Choose one of the above and start daily and increase or decrease as needed.    Increase water intake and can increase fiber intake with over the counter fiber supplement    --------------------------------------------------------------------------------------------------------------------------------       Index Related topics   Hemorrhoids   ________________________________________________________________________  KEY POINTS    Hemorrhoids are swollen veins in the lower end of your intestine (rectum) or the anus. They can cause pain, bleeding, and itching.    Your healthcare provider will treat your hemorrhoids if they are causing discomfort or problems. Treatment may be a diet change, special baths, medicine, or surgery.    Always tell your healthcare provider when you have rectal bleeding. Although bleeding is often from hemorrhoids, more serious illnesses such as colon cancer, can also cause bleeding.  ________________________________________________________________________  What are hemorrhoids?  Hemorrhoids are swollen veins in the lower end of your intestine (rectum) or the anus. The anus is the opening where bowel movements pass out of your body.  Hemorrhoids are a common problem. They can cause pain, bleeding, and itching. Another name for them is piles.  Hemorrhoids may be external or internal.    External hemorrhoids can be seen or felt easily around the anal opening.    Internal hemorrhoids are inside the rectum. Sometimes they may stretch and fall out (prolapse) through the anus to outside the body.      What is the cause?  Hemorrhoids can result from too much pressure on veins in the rectum and around the anus. You may put pressure on these veins by:    Straining to have a bowel movement when you are constipated    Waiting too long to have a bowel movement    Sitting for a long time on the toilet, which causes strain on the anal area    Sitting anywhere for a long while    Coughing a  lot, as happens with some lung diseases  Hemorrhoids may also develop from:    Diarrhea    Obesity    Injury to the anus, for example, from anal sex    Some liver diseases  You may have flare-ups of hemorrhoids when you are under stress. Some people inherit a tendency to have hemorrhoids.  Pregnant women should try to avoid getting constipated because hemorrhoids are more likely to happen during pregnancy. In the last trimester of pregnancy, the enlarged uterus may press on veins and cause hemorrhoids. Also, the strain of childbirth sometimes causes hemorrhoids after the birth.    What are the symptoms?  Often hemorrhoids do not cause any symptoms. If you do have symptoms, they may include:    Itching, mild burning, and bleeding around the anus. For example, you might see bright red blood on toilet paper after wiping.    Swelling and tenderness around the anus    Pain with bowel movements    Painful lumps around the anus ranging in size from a pea to a walnut in severe cases  Internal hemorrhoids are often painless, but they sometimes cause a lot of bleeding. If the veins fall outside the anus, they may become irritated and painful.    How are they diagnosed?  Your healthcare provider will ask about your symptoms and medical history and examine you. Tests may include:    Rectal exam, which your provider does by gently putting a lubricated and gloved finger in your rectum    Anoscopy, which uses a small, lighted tube put into your rectum to look for hemorrhoids or other causes of bleeding      How are they treated?  They are a problem only if they are causing discomfort or problems. The following treatments usually help to relieve most cases of hemorrhoids:    High-fiber diet  Eat more high-fiber foods, which can help prevent constipation. The best sources of fiber are beans, such as navy, kidney, or black beans; whole-grain cereals, such as shredded wheat or cereals with bran; fresh fruit, such as apples with the  skin; and raw or cooked vegetables, especially cabbage, carrots, corn, and broccoli.    Fluids  Drink plenty of water. This helps to soften bowel movements so they are easier to pass.    Sitz baths and cold packs    Sitting in a lukewarm bath 2 or 3 times a day for 15 minutes cleans the anal area and may relieve discomfort. (Don t let bath water get too hot. It could worsen swelling.)    Putting a cloth-covered ice pack on the anus or sitting on a covered ice pack for 10 minutes, 4 times a day might help.    Medicine  Ask your healthcare provider or pharmacist what nonprescription products might help relieve pain and itching. If nonprescription medicines don t help, your provider may prescribe a cream or ointment for the painful area. Your provider may also prescribe medicated suppositories to put inside your rectum.    Procedures and surgeries  A number of procedures can be used to remove or shrink hemorrhoids that are not responding to other treatments, are large and very swollen, or are bothering you. These procedures include:    Hemorrhoid banding, which puts tight bands around the swollen veins. After a few weeks the tissue under the bands falls off, leaving a healed scar.    Destroying the hemorrhoids with freezing, electrical or laser heat, or infrared light    Shrinking the hemorrhoids with injection of a chemical around the swollen vein    For severe cases, surgery to cut out the hemorrhoids  How can I take care of myself?  Always tell your healthcare provider when you have rectal bleeding. Although bleeding is often from hemorrhoids, more serious illnesses, such as colon cancer, can also cause bleeding.  Follow these guidelines to help prevent hemorrhoids and to relieve their discomfort:    Don t strain during bowel movements. The straining makes hemorrhoids swell.    Eat a high-fiber diet and drink plenty of water.    If necessary, take a stool softener. Softer stools make it easier to empty the bowels  and reduce pressure on the veins. Stool softeners are available without a prescription at most pharmacies and drug stores. If you have any questions about which product to buy, ask your healthcare provider or pharmacist for more information.    Don't overuse laxatives. Diarrhea can be as irritating to the anus as constipation.    Exercise regularly to help prevent constipation. Ask your healthcare provider for an exercise prescription.    Avoid a lot of wiping after a bowel movement if you have hemorrhoids. Wiping gently with soft, moist toilet paper or a commercial moist pad or baby wipe may relieve discomfort. If necessary, shower instead of wiping and then dry the anus gently.    Avoid lifting heavy objects when you have hemorrhoids. It may increase the pressure on the veins and make the hemorrhoids worse.    Developed by [employer].  Adult Advisor 2016.3 published by [employer].  Last modified: 2015-10-02  Last reviewed: 2015-10-02  This content is reviewed periodically and is subject to change as new health information becomes available. The information is intended to inform and educate and is not a replacement for medical evaluation, advice, diagnosis or treatment by a healthcare professional.  References   Adult Advisor 2016.3 Index    Copyright   2016 [employer], a division of [employer] All rights reserved.

## 2018-01-04 NOTE — TELEPHONE ENCOUNTER
Patient Information     Patient Name MRN Shirley Randhawa [id number] Female 1955      Telephone Encounter by Sonia Mitchell DO at 4/3/2017  8:20 AM     Author:  Sonia Mitchell DO Service:  (none) Author Type:  PHYS- Osteopathic     Filed:  4/3/2017  8:21 AM Encounter Date:  4/3/2017 Status:  Signed     :  Sonia Mitchell DO (PHYS- Osteopathic)            Please call Shirley and clarify her symptoms.  Being that she has only had it for 3 days I am not sure that we would do much more than conservative over-the-counter treatments unless she has concerning symptoms.    If she would like to give it a few more days I can send her some over-the-counter treatments to try feel my chart and we can set up an appointment later this week in case it does not get better.

## 2018-01-04 NOTE — PROGRESS NOTES
Patient Information     Patient Name MRN Shirley Randhawa [id number] Female 1955      Progress Notes by Sonia Mitchell DO at 2017 10:00 AM     Author:  Sonia Mitchell DO Service:  (none) Author Type:  PHYS- Osteopathic     Filed:  2017 12:50 PM Encounter Date:  2017 Status:  Signed     :  Sonia Mitchell DO (PHYS- Osteopathic)            Chief Complaint     Patient presents with       Medication Management      review and discussion        Subjective:   Ms. Hollins is a 61 y.o. female  seen for follow-up of her anxiety and reflux and acutely for several issues including constipation and hemorrhoids along with some skin lumps and ongoing insomnia.    She does feel overall that her anxiety has been heightened lately.  She does retire in 5 days.  She has had for panic attacks in the last couple months.  She does take clonazepam 1 mg at bedtime.  She is interested in tapering down off of this.  She has been on medications in the past including Xanax and Atarax.  She did not feel that the Xanax helped her panic attacks and needed to go up on the dose.  She does feel that her anxiety will go down after she retires.  She does take her clonazepam for sleep also.  She is a very light sleeper and is concerned that going down on it well affect her ability to sleep well.  We did discuss this in depth today.    She does continue to have some issues with acid reflux.  She has been taking her Zantac every other day from her omeprazole.  She has not been able to taper down to 20 mg of omeprazole on the proton pump inhibitor days.  She is currently still taking 40 mg.  She does try to watch her diet overall.  She does not have any issues on the days that she takes Zantac.    She does have ongoing issues with constipation and hemorrhoids.  She has tried multiple over-the-counter things including witch hazel, castor oil and other things.  She generally gets stomach cramping when she has taken Ex-Lax in  the past.  She has had long-term chronic issues with this.  She does take fiber and drinks a lot of water daily.  She does have occasional bleeding on the toilet paper.  She does feel this is very classic for hemorrhoids.  She had a colonoscopy in 2013 that was essentially normal.    She does have a couple lumps on her skin that she would like to have looked at.  She did have an epidermal inclusion cyst removed by general surgery and is curious if the ones that she currently has could also be removed.  She does have some tenderness with the lump on her dorsum of her left arm.  She also feels that the one on the right arm just distal to her elbow seems to be pulling on the skin which is a sensation she has not felt prior.  She denies any drainage from these.  She denies any erythema on the skin.    She  reports that she quit smoking about 12 years ago. Her smoking use included Cigarettes. She has a 3.00 pack-year smoking history. She has never used smokeless tobacco.    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      ROS:   Pertinent ROS was performed and was negative, including for fevers, chills, chest pain, palpitations, shortness of breath, changes in bowel or bladder, increased lower extremity edema. No other concerns, with exception of HPI above.      Objective:    /80  Pulse 64  Temp 96.1  F (35.6  C)  "(Tympanic)  Resp 18  Ht 1.651 m (5' 5\")  Wt 67.1 kg (148 lb)  Breastfeeding? No  BMI 24.63 kg/m2  GEN: Vitals reviewed.  Patient is in no acute distress. Cooperative with exam.  HEENT: Normocephalic atraumatic.  Pupils equally round.  No scleral icterus, no conjunctival erythema.   CV: Heart regular in rate and rhythm with no murmur.    LUNGS: Lungs clear to auscultation bilaterally.  Chest rise equal bilaterally.  No accessory muscle use.  SKIN: Warm and dry to touch.  No rash on face, arms and legs.  EXT: No clubbing or cyanosis.  No bilateral lower extremity peripheral edema.  She is noted to have 3 lumps on her forearms.  The first is on the left dorsum of her forearm.  It is approximately 8-10 mm across.  It is very mobile.  It is slightly tender with palpation.  No erythema is noted.  She has an additional one on the anterior side of her left forearm.  This is smaller and mobile.  It is nontender.  She also has one lump which feels to be approximately 4-6 mm.  It is less mobile overall.  It is just distal to her elbow.     Assessment/Plan:   1. Constipation, unspecified constipation type  - discussed multiple options for constipation.  She will plan to try MiraLAX.  She will buy this over-the-counter.  She was given dosing for it.  Being that this is chronic for her and there has not been any recent significant change we will not recheck any labs at this time.  She was also provided with suppositories if needed for her constipation.  We did discuss expectations and goals with softening her stools over the next several days to weeks.  She is to call if she has any further questions or concerns.  - bisacodyl (DULCOLAX, BISACODYL,) 10 mg suppository; Insert 1 Suppository rectally once daily if needed for Other (Specify).  Dispense: 30 Suppository; Refill: 1    2. Hemorrhoids, unspecified hemorrhoid type  - topical hemorrhoidal cream is provided at this time.  We did discuss that the best treatment is to " treat her constipation.  She will work on that as above.  She was given handouts on other things to do to prevent hemorrhoids from a behavioral standpoint.  If she has ongoing issues with this.  - dibucaine 1% topical (NUPERCAINAL) 1 % ointment; Apply  topically to affected area(s) 4 times daily if needed for Pain.  Dispense: 28 g; Refill: 0    3. Panic anxiety syndrome  - at this time we will continue to taper off the clonazepam.  We will plan to replace it with Ativan if needed for panic attacks once she comes off of the daily clonazepam.  We can also consider use of an SSRI/SNRI if she does have ongoing symptoms once she retires.  - clonazePAM (KLONOPIN) 0.5 mg tablet; Take 2 tablets by mouth at bedtime. Taper down as needed  Dispense: 60 tablet; Refill: 2    4. Skin lump of arm, left  - given the change in her skin lumps overall I do think referral to general surgery for reevaluation and possible removal is reasonable.  - AMB CONSULT TO GENERAL SURGEON; Future    5. Skin lump of arm, right  - see above  - AMB CONSULT TO GENERAL SURGEON; Future    6. Insomnia, unspecified type  - she was given behavioral modifications to make for her insomnia.  She will continue to taper off of her clonazepam.  She is to call if she has issues with sleep when she is done with the medication.    7. Gastroesophageal reflux disease, esophagitis presence not specified  - at this time she will continue to taper off of her omeprazole.  She was instructed to replace it with Zantac.  She can overlap these for a period of time until she is able to come off of the omeprazole.  She is to call if she has any questions or concerns regarding this.      Return in about 3 months (around 8/8/2017) for Recheck/Follow Up, Anxiety.       Patient Instructions   Start Ranitidine daily.  Take Omeprazole as you currently are for 1 week and then decrease to 20mg for 1 week and then try stopping all while taking Ranitidine on  going.    --------------------------------------------------------------------------------------------------------------------------  - maintain a regular bedtime, routine and wake up time  - include a warm beverage and/or hot bath 1-2 hours before bedtime  - avoid naps  - do not watch the clock  - avoid caffeine, alcohol and nicotine for at least 4-6 hours prior to bed  - exercise daily but avoid exercising right before bed  - no screen time within hour of bed  - keep your bedroom quiet, dark, comfortable and cool  - If you haven t been able to get to sleep after about 20 minutes or more, get up and do something calming or boring until you feel sleepy, then return to bed and try again. Avoid doing anything that is too stimulating or interesting, as this will wake you up even more.  - use bed only for sleep and sex, do not use it as a place to watch TV, eat, read, work on your laptop, pay bills, and other things  - can trial melatonin 1mg - 3mg nightly 2-3 hours before sleep if you have not in the past          --------------------------------------------------------------------------------------------------------------------------------  Polyethylene glycol 17gm mixed with 8+ ounces of water or juice, once or twice daily - softens and moves stools; Can take a couple days to start working    Choose one of the above and start daily and increase or decrease as needed.    Increase water intake and can increase fiber intake with over the counter fiber supplement    --------------------------------------------------------------------------------------------------------------------------------       Index Related topics   Hemorrhoids   ________________________________________________________________________  KEY POINTS    Hemorrhoids are swollen veins in the lower end of your intestine (rectum) or the anus. They can cause pain, bleeding, and itching.    Your healthcare provider will treat your hemorrhoids if they are  causing discomfort or problems. Treatment may be a diet change, special baths, medicine, or surgery.    Always tell your healthcare provider when you have rectal bleeding. Although bleeding is often from hemorrhoids, more serious illnesses such as colon cancer, can also cause bleeding.  ________________________________________________________________________  What are hemorrhoids?  Hemorrhoids are swollen veins in the lower end of your intestine (rectum) or the anus. The anus is the opening where bowel movements pass out of your body.  Hemorrhoids are a common problem. They can cause pain, bleeding, and itching. Another name for them is piles.  Hemorrhoids may be external or internal.    External hemorrhoids can be seen or felt easily around the anal opening.    Internal hemorrhoids are inside the rectum. Sometimes they may stretch and fall out (prolapse) through the anus to outside the body.      What is the cause?  Hemorrhoids can result from too much pressure on veins in the rectum and around the anus. You may put pressure on these veins by:    Straining to have a bowel movement when you are constipated    Waiting too long to have a bowel movement    Sitting for a long time on the toilet, which causes strain on the anal area    Sitting anywhere for a long while    Coughing a lot, as happens with some lung diseases  Hemorrhoids may also develop from:    Diarrhea    Obesity    Injury to the anus, for example, from anal sex    Some liver diseases  You may have flare-ups of hemorrhoids when you are under stress. Some people inherit a tendency to have hemorrhoids.  Pregnant women should try to avoid getting constipated because hemorrhoids are more likely to happen during pregnancy. In the last trimester of pregnancy, the enlarged uterus may press on veins and cause hemorrhoids. Also, the strain of childbirth sometimes causes hemorrhoids after the birth.    What are the symptoms?  Often hemorrhoids do not cause any  symptoms. If you do have symptoms, they may include:    Itching, mild burning, and bleeding around the anus. For example, you might see bright red blood on toilet paper after wiping.    Swelling and tenderness around the anus    Pain with bowel movements    Painful lumps around the anus ranging in size from a pea to a walnut in severe cases  Internal hemorrhoids are often painless, but they sometimes cause a lot of bleeding. If the veins fall outside the anus, they may become irritated and painful.    How are they diagnosed?  Your healthcare provider will ask about your symptoms and medical history and examine you. Tests may include:    Rectal exam, which your provider does by gently putting a lubricated and gloved finger in your rectum    Anoscopy, which uses a small, lighted tube put into your rectum to look for hemorrhoids or other causes of bleeding      How are they treated?  They are a problem only if they are causing discomfort or problems. The following treatments usually help to relieve most cases of hemorrhoids:    High-fiber diet  Eat more high-fiber foods, which can help prevent constipation. The best sources of fiber are beans, such as navy, kidney, or black beans; whole-grain cereals, such as shredded wheat or cereals with bran; fresh fruit, such as apples with the skin; and raw or cooked vegetables, especially cabbage, carrots, corn, and broccoli.    Fluids  Drink plenty of water. This helps to soften bowel movements so they are easier to pass.    Sitz baths and cold packs    Sitting in a lukewarm bath 2 or 3 times a day for 15 minutes cleans the anal area and may relieve discomfort. (Don t let bath water get too hot. It could worsen swelling.)    Putting a cloth-covered ice pack on the anus or sitting on a covered ice pack for 10 minutes, 4 times a day might help.    Medicine  Ask your healthcare provider or pharmacist what nonprescription products might help relieve pain and itching. If  nonprescription medicines don t help, your provider may prescribe a cream or ointment for the painful area. Your provider may also prescribe medicated suppositories to put inside your rectum.    Procedures and surgeries  A number of procedures can be used to remove or shrink hemorrhoids that are not responding to other treatments, are large and very swollen, or are bothering you. These procedures include:    Hemorrhoid banding, which puts tight bands around the swollen veins. After a few weeks the tissue under the bands falls off, leaving a healed scar.    Destroying the hemorrhoids with freezing, electrical or laser heat, or infrared light    Shrinking the hemorrhoids with injection of a chemical around the swollen vein    For severe cases, surgery to cut out the hemorrhoids  How can I take care of myself?  Always tell your healthcare provider when you have rectal bleeding. Although bleeding is often from hemorrhoids, more serious illnesses, such as colon cancer, can also cause bleeding.  Follow these guidelines to help prevent hemorrhoids and to relieve their discomfort:    Don t strain during bowel movements. The straining makes hemorrhoids swell.    Eat a high-fiber diet and drink plenty of water.    If necessary, take a stool softener. Softer stools make it easier to empty the bowels and reduce pressure on the veins. Stool softeners are available without a prescription at most pharmacies and drug stores. If you have any questions about which product to buy, ask your healthcare provider or pharmacist for more information.    Don't overuse laxatives. Diarrhea can be as irritating to the anus as constipation.    Exercise regularly to help prevent constipation. Ask your healthcare provider for an exercise prescription.    Avoid a lot of wiping after a bowel movement if you have hemorrhoids. Wiping gently with soft, moist toilet paper or a commercial moist pad or baby wipe may relieve discomfort. If necessary,  shower instead of wiping and then dry the anus gently.    Avoid lifting heavy objects when you have hemorrhoids. It may increase the pressure on the veins and make the hemorrhoids worse.    Developed by [employer].  Adult Advisor 2016.3 published by [employer].  Last modified: 2015-10-02  Last reviewed: 2015-10-02  This content is reviewed periodically and is subject to change as new health information becomes available. The information is intended to inform and educate and is not a replacement for medical evaluation, advice, diagnosis or treatment by a healthcare professional.  References   Adult Advisor 2016.3 Index    Copyright   2016 [employer], a division of [employer] All rights reserved.                RACHEL LEUNG DO   5/8/2017 12:40 PM    This document was prepared using voice generated softwear. While every attempt was made for accuracy, grammatical errors may exist.

## 2018-01-16 ENCOUNTER — HISTORY (OUTPATIENT)
Dept: FAMILY MEDICINE | Facility: OTHER | Age: 63
End: 2018-01-16

## 2018-01-16 ENCOUNTER — OFFICE VISIT - GICH (OUTPATIENT)
Dept: FAMILY MEDICINE | Facility: OTHER | Age: 63
End: 2018-01-16

## 2018-01-16 DIAGNOSIS — B02.9 ZOSTER WITHOUT COMPLICATIONS: ICD-10-CM

## 2018-01-27 VITALS
SYSTOLIC BLOOD PRESSURE: 102 MMHG | BODY MASS INDEX: 23.66 KG/M2 | WEIGHT: 146.25 LBS | SYSTOLIC BLOOD PRESSURE: 120 MMHG | WEIGHT: 142 LBS | DIASTOLIC BLOOD PRESSURE: 66 MMHG | HEART RATE: 60 BPM | HEIGHT: 65 IN | HEIGHT: 65 IN | TEMPERATURE: 97.9 F | RESPIRATION RATE: 18 BRPM | DIASTOLIC BLOOD PRESSURE: 90 MMHG | BODY MASS INDEX: 24.37 KG/M2

## 2018-01-27 VITALS
DIASTOLIC BLOOD PRESSURE: 80 MMHG | TEMPERATURE: 96.1 F | HEIGHT: 65 IN | WEIGHT: 148 LBS | RESPIRATION RATE: 18 BRPM | BODY MASS INDEX: 24.66 KG/M2 | HEART RATE: 64 BPM | SYSTOLIC BLOOD PRESSURE: 126 MMHG

## 2018-01-27 VITALS
BODY MASS INDEX: 24.3 KG/M2 | SYSTOLIC BLOOD PRESSURE: 102 MMHG | DIASTOLIC BLOOD PRESSURE: 70 MMHG | HEART RATE: 64 BPM | WEIGHT: 146 LBS

## 2018-01-30 ASSESSMENT — PATIENT HEALTH QUESTIONNAIRE - PHQ9: SUM OF ALL RESPONSES TO PHQ QUESTIONS 1-9: 2

## 2018-01-30 ASSESSMENT — ANXIETY QUESTIONNAIRES: GAD7 TOTAL SCORE: 1

## 2018-02-09 VITALS
TEMPERATURE: 98.3 F | BODY MASS INDEX: 24.25 KG/M2 | HEART RATE: 68 BPM | WEIGHT: 144.6 LBS | DIASTOLIC BLOOD PRESSURE: 68 MMHG | SYSTOLIC BLOOD PRESSURE: 108 MMHG

## 2018-02-12 NOTE — PROGRESS NOTES
"Patient Information     Patient Name MRN Sex Shirley Reis [id number] Female 1955      Progress Notes by Dahiana Wagner NP at 2018 11:00 AM     Author:  Dahiana Wagner NP Service:  (none) Author Type:  PHYS- Nurse Practitioner     Filed:  2018  1:53 PM Encounter Date:  2018 Status:  Signed     :  Dahiana Wagner NP (PHYS- Nurse Practitioner)            HPI:    Shirley Hollins is a 62 y.o. female who presents to clinic today for rash.   Red bumps on right side of neck x 4 days.  States the rash is burning and tingling like pins/needles.  \"feels like embedded fiberglass.\"  States the lymph nodes on the right side of neck are also swollen today.  States she had chicken pox as a child and lots of cold sores.  Waking up with headaches the past week, resolve with drinking water.  No fevers.  No sore throat.  No cough.  No eye involvement or symptoms.  Occasional body aches intermittently for the past couple of weeks.  Tried anti itch cream without relief.         Past Medical History:     Diagnosis  Date     Diverticular disease      Ectopic pregnancy      Elevated LFTs     with INH, resolved      Ganglion cyst 2010    Ruptured after a fall       GERD      Headache syndrome, complicated     Reversible cerebral vasoconstriction syndrome 2/2 Trazodone      History of positive PPD     Normal CXR; treated with INH, incomplete treatment but mostly finished      Hyperlipidemia      Hypothyroidism      Lipoma of upper extremity 6/15/2017     Other bursal cyst, left hip 2016     Panic disorder      Seborrheic dermatitis 2015     Surgical menopause     Secondary to endometriosis       Past Surgical History:      Procedure  Laterality Date     BURSECTOMY AND REMOVAL OF SYNOVIAL CYST Left 02/15/2016    L hip/trochanteric bursa. Large inflamed synovial cyst and bursa. [facility] Dr[facility] Zhou MACHADO        SECTION       COLONOSCOPY SCREENING  10/28/2013    Mild " diverticulosis - follow up 10 years       ESOPHAGOGASTRODUODENOSCOPY  2003/2009    gastritis and esophagitis; normal       LAP CHOLECYSTECTOMY  2001     LYSIS OF ADHESIONS  1993     OOPHORECTOMY  1988    right       TONSIL AND ADENOIDECTOMY  1971     TOTAL ABDOMINAL HYSTERECTOMY  1995    with BSO (one ovary at a time)       Social History        Substance Use Topics          Smoking status:   Former Smoker      Packs/day:  0.15      Years:  20.00      Types:  Cigarettes      Quit date:  1/1/2005      Smokeless tobacco:   Never Used      Alcohol use   0.0 oz/week     0 Standard drinks or equivalent per week        Comment: socially; couple drinks weekly        Current Outpatient Prescriptions       Medication  Sig Dispense Refill     Biotin 10 mg tablet Take 10,000 mcg by mouth.       bisacodyl (DULCOLAX, BISACODYL,) 10 mg suppository Insert 1 Suppository rectally once daily if needed for Other (Specify). 30 Suppository 1     Cholecalciferol, Vitamin D3, 400 unit capsule Take 1,000 Units by mouth.       clonazePAM (KLONOPIN) 0.5 mg tablet Take 1 tablet by mouth at bedtime. Taper down as needed 45 tablet 5     dibucaine 1% topical (NUPERCAINAL) 1 % ointment Apply  topically to affected area(s) 4 times daily if needed for Pain. 28 g 0     estradiol (ESTRACE) 0.5 mg tablet Take 1/2 tablet every other day. 45 tablet 4     levothyroxine (SYNTHROID) 100 mcg tablet Take 1 tablet by mouth once daily. 90 tablet 4     naproxen (NAPROSYN) 500 mg tablet Take 1 tablet by mouth 2 times daily with meals. 30 tablet 0     OMEGA-3 FATTY ACIDS/FISH OIL (OMEGA 3 FISH OIL ORAL) Take 1 g by mouth.       omeprazole (PRILOSEC) 20 mg Delayed-Release capsule Take 1 capsule by mouth once daily if needed for GI Upset. Dose reduction       ranitidine (ZANTAC) 150 mg tablet Take 1 tablet by mouth once daily.  0     No current facility-administered medications for this visit.      Medications have been reviewed by me and are current to the best of  my knowledge and ability.    Allergies      Allergen   Reactions     Other [Unlisted Allergen (Include Detail In Comments)]  Headache     SSRIs cause RCVS (reversible cerebral vasoconstriction syndrome)      Trazodone  Headache and Hypertension     RCVS        Past medical history, past surgical history, current medications and allergies reviewed and accurate to the best of my knowledge.        ROS:  Refer to HPI    /68 (Cuff Site: Right Arm, Position: Sitting, Cuff Size: Adult Regular)  Pulse 68  Temp 98.3  F (36.8  C) (Tympanic)   Wt 65.6 kg (144 lb 9.6 oz)  Breastfeeding? No  BMI 24.25 kg/m2    EXAM:  General Appearance: Well appearing adult female, appropriate appearance for age. No acute distress  Head: normocephalic, atraumatic  Ears: Left TM with bony landmarks appreciated, no erythema, no effusion, no bulging, no purulence.  Right TM with bony landmarks appreciated, no erythema, no effusion, no bulging, no purulence.   Left auditory canal clear.  Right auditory canal clear.  Normal external ears, non tender.  Eyes: conjunctivae normal without erythema or irritation, no drainage or crusting, no eyelid swelling, pupils equal   Orophayrnx: moist mucous membranes, posterior pharynx without erythema, tonsils without hypertrophy, no erythema, no exudates or petechiae, no post nasal drip seen.    Neck: supple without adenopathy  Respiratory: normal chest wall and respirations.  Normal effort.  Clear to auscultation bilaterally, no wheezing, crackles or rhonchi.  No increased work of breathing.  No cough appreciated.   Cardiac: RRR with no murmurs  Musculoskeletal:  Normal gait.  Equal movement of bilateral upper extremities.  Equal movement of bilateral lower extremities.    Dermatological:  Right side of neck and right side of posterior scalp with erythematous raised lesions, no vesicles or pustules, no crusting, no bruising.   Rash does not cross the midline.  Psychological: normal affect, alert and  pleasant          ASSESSMENT/PLAN:    ICD-10-CM    1. Herpes zoster without complication B02.9 acyclovir (ZOVIRAX) 800 mg tablet      CERAVE LOTION WITH 2.5% HC 2.5% (ERIC AMB MIXTURE)      lidocaine 5% (LIDODERM) 5 % patch         Symptoms consistent with shingles  Acyclovir 800 mg 5 x day for 7 days   Refilled Rx for Cerave lotion with hydrocortisone cream 2.5 %  Lidoderm patches - may use up to 12 hours per 24 hours to affected area  Encouraged fluids and rest   Tylenol or ibuprofen or naproxen PRN  Follow up if symptoms persist or worsen or concerns            Patient Instructions   Acyclovir 5 x day for 7 days     Lidoderm patches - may use up to 12 hours per 24 hours     Follow up as needed         Index Ukrainian All languages Related topics   Shingles   ________________________________________________________________________  KEY POINTS    Shingles is a painful skin rash caused by the same virus that causes chickenpox.    If you ever had chickenpox, the virus stays in your body and can become active again at any time in your life and cause shingles.    Shingles is treated with antiviral and other medicines. If you have never had chickenpox, you can get a shot to help prevent infection with the chickenpox virus.  ________________________________________________________________________  What is shingles?  Shingles is a painful skin rash caused by the same virus that causes chickenpox.  Shingles is also called herpes zoster. This illness is most common in people over 50 years old.  What is the cause?  The virus that causes chickenpox and shingles is called varicella zoster. After you recover from chickenpox, the virus stays in your body but it is inactive. The virus can become active again at any time in your life and cause shingles if your body's immune system is weakened by things like:    Illness    Chemotherapy or radiation    Certain medicines, such as steroids    Aging  Your risk for getting shingles may be  higher if you have been under a lot of stress. Sometimes shingles happens for no known reason.  You cannot have shingles unless you have already had chickenpox, and you cannot get shingles from someone else. However, a person with shingles can transmit chickenpox to a person who has never been exposed to the chickenpox virus. The virus is spread by contact with the blisters, which contain live virus in the fluid. The blisters are no longer contagious after they dry up and form scabs.  What are the symptoms?  The first symptoms may include:    Burning, sharp pain, tingling or numbness in your skin on one side of your body, head, or face    Severe itching or aching    Feeling tired    Feeling sick with fever, chills, headache, and upset stomach or belly pain  One to 14 days after you start feeling pain, you will notice a rash of small blisters on reddened skin. Within a few days after the rash appears, the blisters will turn yellow, then dry up and form scabs. Over the next 2 weeks the scabs drop off, and the skin heals over the next several days to weeks.  The blisters are usually found in a path, often extending from the back or side around to the middle of the belly. The blisters are usually on just one side of the body. They may also appear on one side of your face or scalp. The painful rash may be in the area of your ear or eye. When shingles occurs on the head or scalp, it may cause weakness of one side of the face, making that side of the face look droopy.  How is it diagnosed?  Your healthcare provider will ask about your symptoms and medical history and examine you. The diagnosis is usually obvious from the symptoms and the rash. If your provider wants to confirm the diagnosis, you may have lab tests to look for the virus in fluid from a blister.  How is it treated?  Several medicines can help treat shingles. Your healthcare provider may give you:    Antiviral medicine to help you heal faster    Pills or  ointment to help relieve pain    Antibacterial medicine to treat bacterial infection of the blisters  For most people, the pain of shingles goes away in the first month or two after the blisters heal. If you have shingles on your head or scalp, it may take longer for the pain to go away. Sometimes the virus damages a nerve. This can cause pain, numbness, or tingling for months or even years after the rash is healed and is called postherpetic neuralgia. The older you are when you have shingles, the more likely it is that you will have postherpetic neuralgia. Taking antiviral medicine as soon as shingles is diagnosed may help prevent this problem.  How can I take care of myself?  Here are some things you can do to help relieve pain:    Take nonprescription pain medicine, such as acetaminophen, ibuprofen, or naproxen. Read the label and take as directed. Unless recommended by your healthcare provider, you should not take these medicines for more than 10 days.    Nonsteroidal anti-inflammatory medicines (NSAIDs), such as ibuprofen, naproxen, and aspirin, may cause stomach bleeding and other problems. These risks increase with age.    Acetaminophen may cause liver damage or other problems. Unless recommended by your provider, don't take more than 3000 milligrams (mg) in 24 hours. To make sure you don t take too much, check other medicines you take to see if they also contain acetaminophen. Ask your provider if you need to avoid drinking alcohol while taking this medicine.    Check with your healthcare provider before you give any medicine that contains aspirin or salicylates to a child or teen. This includes medicines like baby aspirin, some cold medicines, and Pepto-Bismol. Children and teens who take aspirin are at risk for a serious illness called Reye's syndrome.    Put cool, moist washcloths on the rash.    Try not to let clothing or bed linens rub against the rash and irritate it.  Rest in bed if you have fever and  other symptoms of illness.  Ask your healthcare provider:    How and when you will get your test results    How long it will take to recover from this illness    If there are activities you should avoid and when you can return to normal activities    How to take care of yourself at home    What symptoms or problems you should watch for and what to do if you have them  Make sure you know when you should come back for a checkup. Keep all appointments for provider visits or tests.  How can I help prevent shingles?  If you have never had chickenpox, you can get a shot to help prevent infection with the chickenpox virus. Most children now get shots to prevent chickenpox.  If you are 50 or older, you can get a different shot that helps prevent shingles. The shingles vaccine is recommended for all adults 60 and older regardless of whether they remember having had chicken pox or not. The shingles shot does not always prevent shingles. However, if you do get shingles sometime after you get the shot, you may have less pain. The shingles shot is not used to treat shingles once you have it.  You can also lessen your chances of getting shingles by trying to keep stress under control, exercising regularly, and eating a healthy diet.  If you have shingles, make sure that anyone who has not had chickenpox or the chickenpox shot does not come into close contact with you until the blisters are completely dry. You are no longer contagious after the blisters dry up and form scabs.  Developed by [employer].  Adult Advisor 2017.2 published by [employer].  Last modified: 2016-10-31  Last reviewed: 2016-10-31  This content is reviewed periodically and is subject to change as new health information becomes available. The information is intended to inform and educate and is not a replacement for medical evaluation, advice, diagnosis or treatment by a healthcare professional.  References   Adult Advisor 2017.2 Index    Copyright   2017  [employer], a division of [employer] All rights reserved.

## 2018-02-12 NOTE — NURSING NOTE
"Patient Information     Patient Name MRN Shirley Randhawa [id number] Female 1955      Nursing Note by Fely Cooper at 2018 11:00 AM     Author:  Fely Cooper Service:  (none) Author Type:  (none)     Filed:  2018 11:53 AM Encounter Date:  2018 Status:  Signed     :  Fely Cooper            Patient presents today for possible shingles concern. Patient had red bumps show up on her neck . Patient noticed her lymph nodes seem swollen on right side of her neck and sore. She has tried using anti itch creams on bumps, with no relief. Patient states, \"she just feels uncomfortable and not right\".    Fely Cooper LPN..............2018 11:37 AM'          "

## 2018-02-13 ENCOUNTER — TRANSFERRED RECORDS (OUTPATIENT)
Dept: HEALTH INFORMATION MANAGEMENT | Facility: OTHER | Age: 63
End: 2018-02-13

## 2018-02-13 NOTE — PATIENT INSTRUCTIONS
Patient Information     Patient Name MRN Shirley Randhawa [id number] Female 1955      Patient Instructions by Dahiana Wagner NP at 2018 12:06 PM     Author:  Dahiana Wagner NP  Service:  (none) Author Type:  PHYS- Nurse Practitioner     Filed:  2018 12:06 PM  Encounter Date:  2018 Status:  Addendum     :  Dahiana Wagner NP (PHYS- Nurse Practitioner)        Related Notes: Original Note by Dahinaa Wagner NP (PHYS- Nurse Practitioner) filed at 2018 12:06 PM            Acyclovir 5 x day for 7 days     Lidoderm patches - may use up to 12 hours per 24 hours     Follow up as needed         Index Faroese All languages Related topics   Shingles   ________________________________________________________________________  KEY POINTS    Shingles is a painful skin rash caused by the same virus that causes chickenpox.    If you ever had chickenpox, the virus stays in your body and can become active again at any time in your life and cause shingles.    Shingles is treated with antiviral and other medicines. If you have never had chickenpox, you can get a shot to help prevent infection with the chickenpox virus.  ________________________________________________________________________  What is shingles?  Shingles is a painful skin rash caused by the same virus that causes chickenpox.  Shingles is also called herpes zoster. This illness is most common in people over 50 years old.  What is the cause?  The virus that causes chickenpox and shingles is called varicella zoster. After you recover from chickenpox, the virus stays in your body but it is inactive. The virus can become active again at any time in your life and cause shingles if your body's immune system is weakened by things like:    Illness    Chemotherapy or radiation    Certain medicines, such as steroids    Aging  Your risk for getting shingles may be higher if you have been under a lot of stress. Sometimes shingles  happens for no known reason.  You cannot have shingles unless you have already had chickenpox, and you cannot get shingles from someone else. However, a person with shingles can transmit chickenpox to a person who has never been exposed to the chickenpox virus. The virus is spread by contact with the blisters, which contain live virus in the fluid. The blisters are no longer contagious after they dry up and form scabs.  What are the symptoms?  The first symptoms may include:    Burning, sharp pain, tingling or numbness in your skin on one side of your body, head, or face    Severe itching or aching    Feeling tired    Feeling sick with fever, chills, headache, and upset stomach or belly pain  One to 14 days after you start feeling pain, you will notice a rash of small blisters on reddened skin. Within a few days after the rash appears, the blisters will turn yellow, then dry up and form scabs. Over the next 2 weeks the scabs drop off, and the skin heals over the next several days to weeks.  The blisters are usually found in a path, often extending from the back or side around to the middle of the belly. The blisters are usually on just one side of the body. They may also appear on one side of your face or scalp. The painful rash may be in the area of your ear or eye. When shingles occurs on the head or scalp, it may cause weakness of one side of the face, making that side of the face look droopy.  How is it diagnosed?  Your healthcare provider will ask about your symptoms and medical history and examine you. The diagnosis is usually obvious from the symptoms and the rash. If your provider wants to confirm the diagnosis, you may have lab tests to look for the virus in fluid from a blister.  How is it treated?  Several medicines can help treat shingles. Your healthcare provider may give you:    Antiviral medicine to help you heal faster    Pills or ointment to help relieve pain    Antibacterial medicine to treat  bacterial infection of the blisters  For most people, the pain of shingles goes away in the first month or two after the blisters heal. If you have shingles on your head or scalp, it may take longer for the pain to go away. Sometimes the virus damages a nerve. This can cause pain, numbness, or tingling for months or even years after the rash is healed and is called postherpetic neuralgia. The older you are when you have shingles, the more likely it is that you will have postherpetic neuralgia. Taking antiviral medicine as soon as shingles is diagnosed may help prevent this problem.  How can I take care of myself?  Here are some things you can do to help relieve pain:    Take nonprescription pain medicine, such as acetaminophen, ibuprofen, or naproxen. Read the label and take as directed. Unless recommended by your healthcare provider, you should not take these medicines for more than 10 days.    Nonsteroidal anti-inflammatory medicines (NSAIDs), such as ibuprofen, naproxen, and aspirin, may cause stomach bleeding and other problems. These risks increase with age.    Acetaminophen may cause liver damage or other problems. Unless recommended by your provider, don't take more than 3000 milligrams (mg) in 24 hours. To make sure you don t take too much, check other medicines you take to see if they also contain acetaminophen. Ask your provider if you need to avoid drinking alcohol while taking this medicine.    Check with your healthcare provider before you give any medicine that contains aspirin or salicylates to a child or teen. This includes medicines like baby aspirin, some cold medicines, and Pepto-Bismol. Children and teens who take aspirin are at risk for a serious illness called Reye's syndrome.    Put cool, moist washcloths on the rash.    Try not to let clothing or bed linens rub against the rash and irritate it.  Rest in bed if you have fever and other symptoms of illness.  Ask your healthcare provider:    How  and when you will get your test results    How long it will take to recover from this illness    If there are activities you should avoid and when you can return to normal activities    How to take care of yourself at home    What symptoms or problems you should watch for and what to do if you have them  Make sure you know when you should come back for a checkup. Keep all appointments for provider visits or tests.  How can I help prevent shingles?  If you have never had chickenpox, you can get a shot to help prevent infection with the chickenpox virus. Most children now get shots to prevent chickenpox.  If you are 50 or older, you can get a different shot that helps prevent shingles. The shingles vaccine is recommended for all adults 60 and older regardless of whether they remember having had chicken pox or not. The shingles shot does not always prevent shingles. However, if you do get shingles sometime after you get the shot, you may have less pain. The shingles shot is not used to treat shingles once you have it.  You can also lessen your chances of getting shingles by trying to keep stress under control, exercising regularly, and eating a healthy diet.  If you have shingles, make sure that anyone who has not had chickenpox or the chickenpox shot does not come into close contact with you until the blisters are completely dry. You are no longer contagious after the blisters dry up and form scabs.  Developed by [employer].  Adult Advisor 2017.2 published by [employer].  Last modified: 2016-10-31  Last reviewed: 2016-10-31  This content is reviewed periodically and is subject to change as new health information becomes available. The information is intended to inform and educate and is not a replacement for medical evaluation, advice, diagnosis or treatment by a healthcare professional.  References   Adult Advisor 2017.2 Index    Copyright   2017 [employer], a division of [employer] All rights  reserved.

## 2018-02-14 DIAGNOSIS — M76.891 HAMSTRING TENDONITIS OF RIGHT THIGH: Primary | ICD-10-CM

## 2018-02-15 ENCOUNTER — HOSPITAL ENCOUNTER (OUTPATIENT)
Dept: PHYSICAL THERAPY | Facility: OTHER | Age: 63
Setting detail: THERAPIES SERIES
End: 2018-02-15
Attending: NURSE PRACTITIONER
Payer: COMMERCIAL

## 2018-02-15 ENCOUNTER — MEDICAL CORRESPONDENCE (OUTPATIENT)
Dept: HEALTH INFORMATION MANAGEMENT | Facility: OTHER | Age: 63
End: 2018-02-15

## 2018-02-15 PROCEDURE — 97035 APP MDLTY 1+ULTRASOUND EA 15: CPT | Mod: GP

## 2018-02-15 PROCEDURE — 97112 NEUROMUSCULAR REEDUCATION: CPT | Mod: GP

## 2018-02-15 PROCEDURE — 40000185 ZZHC STATISTIC PT OUTPT VISIT

## 2018-02-15 NOTE — PROGRESS NOTES
02/15/18 1200   General Information   Type of Visit Initial OP Ortho PT Evaluation   Start of Care Date 02/15/18   Referring Physician EL Berrios   Patient/Family Goals Statement reduce pain to allow hiking on upcoming trip    Orders Evaluate and Treat   Insurance Type [employer]   Medical Diagnosis hamstring tendinitis of right thigh   Surgical/Medical history reviewed Yes   Presentation and Etiology   Pertinent history of current problem (include personal factors and/or comorbidities that impact the POC) Pain started 02/17, no injury. Tried exercise, NSAIDS and ice. These did not help. Feels it is worsening. Was told she has a Baker's cyst.   Functional Limitations perform activities of daily living;perform desired leisure / sports activities   Symptom Location posterior right knee, pain   Chronicity Chronic   Pain rating (0-10 point scale) Best (/10);Worst (/10)   Best (/10) 3   Worst (/10) 8   Pain quality C. Aching   Frequency of pain/symptoms C. With activity   Pain/symptoms exacerbated by B. Walking   Pain/symptoms eased by C. Rest   Prior Level of Function   Prior Level of Function-Mobility no prior knee issues before 02/17. Was able to walk without pain, use steps without pain   Current Level of Function   Patient role/employment history F. Retired   Fall Risk Screen   Fall screen completed by PT   Have you fallen 2 or more times in the past year? No   Have you fallen and had an injury in the past year? No   Is patient a fall risk? No   Knee Objective Findings   Observation edema posterior knee right   Knee ROM Comment Full motion present   Hip Objective Findings   Gait/Locomotion limited use of right gluteals   Hip/Knee Strength Comments right gluteus jamie 3+/5, gluteus medius 3+/5   Hip Flexibility Comments tightness right iliopsoas   Palpation tenderness with semitendinosis muscle   Accessory Motion/Joint Mobility limited loading through right hip   Lumbar Spine/SI Objective Findings    Lumbar/SI Special Tests Comments + FFT left, noted inferior left pubic shear, ASIS left inferior, left leg short, + squish test left   Palpation restricted medial parietocecal ligament and ligament of cleyet; hyperactivity of right iliopsoas   Planned Therapy Interventions   Planned Therapy Interventions manual therapy;neuromuscular re-education;strengthening;ROM   Planned Modality Interventions   Planned Modality Interventions Ultrasound;Cryotherapy   Clinical Impression   Criteria for Skilled Therapeutic Interventions Met yes, treatment indicated   PT Diagnosis right posterior knee pain, muscle weakness, pelvic dysfunction, myofascial tightenss   Functional limitations due to impairments limited knee and hip stabilization due to weakness, myofascial tightness   Clinical Presentation Stable/Uncomplicated   Clinical Decision Making (Complexity) Low complexity   Therapy Frequency 2 times/Week   Predicted Duration of Therapy Intervention (days/wks) 16 weeks   Risk & Benefits of therapy have been explained Yes   Patient, Family & other staff in agreement with plan of care Yes   Total Evaluation Time   Total Evaluation Time 20

## 2018-02-19 ENCOUNTER — HOSPITAL ENCOUNTER (OUTPATIENT)
Dept: PHYSICAL THERAPY | Facility: OTHER | Age: 63
Setting detail: THERAPIES SERIES
End: 2018-02-19
Attending: NURSE PRACTITIONER
Payer: COMMERCIAL

## 2018-02-19 PROCEDURE — 97035 APP MDLTY 1+ULTRASOUND EA 15: CPT | Mod: GP

## 2018-02-19 PROCEDURE — 40000185 ZZHC STATISTIC PT OUTPT VISIT

## 2018-02-19 PROCEDURE — 97112 NEUROMUSCULAR REEDUCATION: CPT | Mod: GP

## 2018-02-19 PROCEDURE — 97140 MANUAL THERAPY 1/> REGIONS: CPT | Mod: GP

## 2018-02-20 ENCOUNTER — DOCUMENTATION ONLY (OUTPATIENT)
Dept: FAMILY MEDICINE | Facility: OTHER | Age: 63
End: 2018-02-20

## 2018-02-20 PROBLEM — E78.5 HYPERLIPIDEMIA: Status: ACTIVE | Noted: 2018-02-20

## 2018-02-20 PROBLEM — Z86.0101 H/O ADENOMATOUS POLYP OF COLON: Status: ACTIVE | Noted: 2018-02-20

## 2018-02-20 PROBLEM — E03.9 HYPOTHYROIDISM: Status: ACTIVE | Noted: 2018-02-20

## 2018-02-20 PROBLEM — K21.9 ESOPHAGEAL REFLUX: Status: ACTIVE | Noted: 2018-02-20

## 2018-02-20 RX ORDER — NAPROXEN 500 MG/1
1 TABLET ORAL 2 TIMES DAILY WITH MEALS
COMMUNITY
Start: 2017-11-03 | End: 2018-11-06

## 2018-02-20 RX ORDER — DIBUCAINE 0.28 G/28G
OINTMENT TOPICAL 4 TIMES DAILY PRN
COMMUNITY
Start: 2017-05-08 | End: 2018-11-06

## 2018-02-20 RX ORDER — LIDOCAINE 50 MG/G
1 PATCH TOPICAL
COMMUNITY
Start: 2018-01-16 | End: 2018-11-06

## 2018-02-20 RX ORDER — CLONAZEPAM 0.5 MG/1
0.5 TABLET ORAL AT BEDTIME
COMMUNITY
Start: 2017-11-03 | End: 2018-04-30

## 2018-02-20 RX ORDER — BIOTIN 10 MG
10000 TABLET ORAL
COMMUNITY
End: 2018-08-23

## 2018-02-20 RX ORDER — BISACODYL 10 MG
1 SUPPOSITORY, RECTAL RECTAL DAILY PRN
COMMUNITY
Start: 2017-05-08 | End: 2019-11-18

## 2018-02-20 RX ORDER — ESTRADIOL 0.5 MG/1
0.5 TABLET ORAL EVERY OTHER DAY
COMMUNITY
Start: 2017-11-03 | End: 2019-05-06

## 2018-02-20 RX ORDER — IBUPROFEN 800 MG
1000 TABLET ORAL
COMMUNITY
End: 2018-11-06

## 2018-02-20 RX ORDER — LEVOTHYROXINE SODIUM 100 UG/1
100 TABLET ORAL DAILY
COMMUNITY
Start: 2017-11-03 | End: 2018-11-06

## 2018-02-22 ENCOUNTER — HOSPITAL ENCOUNTER (OUTPATIENT)
Dept: PHYSICAL THERAPY | Facility: OTHER | Age: 63
Setting detail: THERAPIES SERIES
End: 2018-02-22
Attending: NURSE PRACTITIONER
Payer: COMMERCIAL

## 2018-02-22 PROCEDURE — 97140 MANUAL THERAPY 1/> REGIONS: CPT | Mod: GP

## 2018-02-22 PROCEDURE — 40000185 ZZHC STATISTIC PT OUTPT VISIT

## 2018-02-22 PROCEDURE — 97035 APP MDLTY 1+ULTRASOUND EA 15: CPT | Mod: GP

## 2018-02-26 ENCOUNTER — HOSPITAL ENCOUNTER (OUTPATIENT)
Dept: PHYSICAL THERAPY | Facility: OTHER | Age: 63
Setting detail: THERAPIES SERIES
End: 2018-02-26
Attending: NURSE PRACTITIONER
Payer: COMMERCIAL

## 2018-02-26 PROCEDURE — 40000185 ZZHC STATISTIC PT OUTPT VISIT

## 2018-02-26 PROCEDURE — 97035 APP MDLTY 1+ULTRASOUND EA 15: CPT | Mod: GP

## 2018-03-30 ENCOUNTER — HOSPITAL ENCOUNTER (OUTPATIENT)
Dept: PHYSICAL THERAPY | Facility: OTHER | Age: 63
Setting detail: THERAPIES SERIES
End: 2018-03-30
Attending: NURSE PRACTITIONER
Payer: COMMERCIAL

## 2018-03-30 PROCEDURE — 40000185 ZZHC STATISTIC PT OUTPT VISIT

## 2018-03-30 PROCEDURE — 97035 APP MDLTY 1+ULTRASOUND EA 15: CPT | Mod: GP

## 2018-03-30 PROCEDURE — 97112 NEUROMUSCULAR REEDUCATION: CPT | Mod: GP

## 2018-04-03 ENCOUNTER — HOSPITAL ENCOUNTER (OUTPATIENT)
Dept: PHYSICAL THERAPY | Facility: OTHER | Age: 63
Setting detail: THERAPIES SERIES
End: 2018-04-03
Attending: NURSE PRACTITIONER
Payer: COMMERCIAL

## 2018-04-03 PROCEDURE — 97035 APP MDLTY 1+ULTRASOUND EA 15: CPT | Mod: GP

## 2018-04-03 PROCEDURE — 40000185 ZZHC STATISTIC PT OUTPT VISIT

## 2018-04-03 PROCEDURE — 97140 MANUAL THERAPY 1/> REGIONS: CPT | Mod: GP

## 2018-04-05 ENCOUNTER — HOSPITAL ENCOUNTER (OUTPATIENT)
Dept: PHYSICAL THERAPY | Facility: OTHER | Age: 63
Setting detail: THERAPIES SERIES
End: 2018-04-05
Attending: NURSE PRACTITIONER
Payer: COMMERCIAL

## 2018-04-05 PROCEDURE — 97035 APP MDLTY 1+ULTRASOUND EA 15: CPT | Mod: GP

## 2018-04-05 PROCEDURE — 97140 MANUAL THERAPY 1/> REGIONS: CPT | Mod: GP

## 2018-04-05 PROCEDURE — 40000185 ZZHC STATISTIC PT OUTPT VISIT

## 2018-04-09 ENCOUNTER — HOSPITAL ENCOUNTER (OUTPATIENT)
Dept: PHYSICAL THERAPY | Facility: OTHER | Age: 63
Setting detail: THERAPIES SERIES
End: 2018-04-09
Attending: NURSE PRACTITIONER
Payer: COMMERCIAL

## 2018-04-09 ENCOUNTER — HOSPITAL ENCOUNTER (OUTPATIENT)
Dept: MRI IMAGING | Facility: OTHER | Age: 63
Discharge: HOME | End: 2018-04-09
Attending: NURSE PRACTITIONER | Admitting: NURSE PRACTITIONER
Payer: COMMERCIAL

## 2018-04-09 DIAGNOSIS — S83.209A TEAR MENISCUS KNEE: ICD-10-CM

## 2018-04-09 DIAGNOSIS — M25.561 POSTERIOR RIGHT KNEE PAIN: ICD-10-CM

## 2018-04-09 DIAGNOSIS — M25.561 RIGHT MEDIAL KNEE PAIN: ICD-10-CM

## 2018-04-09 DIAGNOSIS — M25.561 RIGHT KNEE PAIN: ICD-10-CM

## 2018-04-09 PROCEDURE — 97140 MANUAL THERAPY 1/> REGIONS: CPT | Mod: GP

## 2018-04-09 PROCEDURE — 40000185 ZZHC STATISTIC PT OUTPT VISIT

## 2018-04-09 PROCEDURE — 97035 APP MDLTY 1+ULTRASOUND EA 15: CPT | Mod: GP

## 2018-04-09 PROCEDURE — 73721 MRI JNT OF LWR EXTRE W/O DYE: CPT | Mod: RT

## 2018-04-11 ENCOUNTER — HOSPITAL ENCOUNTER (OUTPATIENT)
Dept: PHYSICAL THERAPY | Facility: OTHER | Age: 63
Setting detail: THERAPIES SERIES
End: 2018-04-11
Attending: NURSE PRACTITIONER
Payer: COMMERCIAL

## 2018-04-11 PROCEDURE — 40000185 ZZHC STATISTIC PT OUTPT VISIT

## 2018-04-11 PROCEDURE — 97110 THERAPEUTIC EXERCISES: CPT | Mod: GP

## 2018-04-17 ENCOUNTER — HOSPITAL ENCOUNTER (OUTPATIENT)
Dept: PHYSICAL THERAPY | Facility: OTHER | Age: 63
Setting detail: THERAPIES SERIES
End: 2018-04-17
Attending: NURSE PRACTITIONER
Payer: COMMERCIAL

## 2018-04-17 PROCEDURE — 40000185 ZZHC STATISTIC PT OUTPT VISIT

## 2018-04-17 PROCEDURE — 97110 THERAPEUTIC EXERCISES: CPT | Mod: GP

## 2018-04-17 NOTE — PROGRESS NOTES
Outpatient Physical Therapy Progress Note     Patient: Shirley Hollins  : 1955    Beginning/End Dates of Reporting Period:  02/15/18 to 2018    Referring Provider: EL Berrios    Therapy Diagnosis: right posterior knee pain, muscle weakness, pelvic dysfunction, myofascial tightness      Client Self Report: Feels she has made progress. Using ice, doing HEP. Still sore with heel raises, walking down steps.    Objective Measurements:    Objective Measure: joint mobility  Details: tight right iliopsoas  Objective Measure: MMT  Details: hamstrings left 3/5 right 3+/5        Goals:  Goal Identifier strength   Goal Description patient to have 5/5 strength for gluteals in order to stabilize hip and knee during walking, hiking.   Target Date 08/15/18   Date Met      Progress: gluteal strength is improving, also working strength of right hamstrings and calf mm's     Goal Identifier joint mobility   Goal Description Patient to display normal and symmetrical pelvis and hip mechanics in order to walki and hike without knee pain.   Target Date 08/15/18   Date Met      Progress: normal pelvic mechanics but still has pain with walking       Progress Toward Goals:   Progress this reporting period: MRI revealed medial meniscus tear, posterior horn; intra substance tear of medial gastroc, baker's cyst. Progressing with strength,  And pain reduction.      Plan:  Continue therapy per current plan of care.    Discharge:  No

## 2018-04-23 ENCOUNTER — HOSPITAL ENCOUNTER (OUTPATIENT)
Dept: PHYSICAL THERAPY | Facility: OTHER | Age: 63
Setting detail: THERAPIES SERIES
End: 2018-04-23
Attending: NURSE PRACTITIONER
Payer: COMMERCIAL

## 2018-04-23 PROCEDURE — 40000185 ZZHC STATISTIC PT OUTPT VISIT

## 2018-04-23 PROCEDURE — 97035 APP MDLTY 1+ULTRASOUND EA 15: CPT | Mod: GP

## 2018-04-23 PROCEDURE — 97140 MANUAL THERAPY 1/> REGIONS: CPT | Mod: GP

## 2018-04-30 ENCOUNTER — HOSPITAL ENCOUNTER (OUTPATIENT)
Dept: PHYSICAL THERAPY | Facility: OTHER | Age: 63
Setting detail: THERAPIES SERIES
End: 2018-04-30
Attending: NURSE PRACTITIONER
Payer: COMMERCIAL

## 2018-04-30 PROCEDURE — 40000185 ZZHC STATISTIC PT OUTPT VISIT

## 2018-04-30 PROCEDURE — 97110 THERAPEUTIC EXERCISES: CPT | Mod: GP

## 2018-04-30 PROCEDURE — 97035 APP MDLTY 1+ULTRASOUND EA 15: CPT | Mod: GP

## 2018-05-01 ENCOUNTER — TELEPHONE (OUTPATIENT)
Dept: INTERNAL MEDICINE | Facility: OTHER | Age: 63
End: 2018-05-01

## 2018-05-07 ENCOUNTER — HOSPITAL ENCOUNTER (OUTPATIENT)
Dept: PHYSICAL THERAPY | Facility: OTHER | Age: 63
Setting detail: THERAPIES SERIES
End: 2018-05-07
Attending: NURSE PRACTITIONER
Payer: COMMERCIAL

## 2018-05-07 PROCEDURE — 97140 MANUAL THERAPY 1/> REGIONS: CPT | Mod: GP

## 2018-05-07 PROCEDURE — 97035 APP MDLTY 1+ULTRASOUND EA 15: CPT | Mod: GP

## 2018-05-07 PROCEDURE — 40000185 ZZHC STATISTIC PT OUTPT VISIT

## 2018-05-09 ENCOUNTER — HOSPITAL ENCOUNTER (OUTPATIENT)
Dept: PHYSICAL THERAPY | Facility: OTHER | Age: 63
Setting detail: THERAPIES SERIES
End: 2018-05-09
Attending: NURSE PRACTITIONER
Payer: COMMERCIAL

## 2018-05-09 PROCEDURE — 97140 MANUAL THERAPY 1/> REGIONS: CPT | Mod: GP

## 2018-05-09 PROCEDURE — 40000185 ZZHC STATISTIC PT OUTPT VISIT

## 2018-05-09 PROCEDURE — 97110 THERAPEUTIC EXERCISES: CPT | Mod: GP

## 2018-05-09 PROCEDURE — 97035 APP MDLTY 1+ULTRASOUND EA 15: CPT | Mod: GP

## 2018-08-01 NOTE — ADDENDUM NOTE
Encounter addended by: Mariel Patel, PT on: 8/1/2018  8:52 AM<BR>     Actions taken: Sign clinical note, Flowsheet accepted, Episode resolved

## 2018-08-01 NOTE — PROGRESS NOTES
Outpatient Physical Therapy Discharge Note     Patient: Shirley Hollins  : 1955    Beginning/End Dates of Reporting Period:  2/15/18 to 2018    Referring Provider: Brandon LANTIGUANP    Therapy Diagnosis: right posterior knee pain, muscle weakness, pelvic dysfunction, myofascial tightness     Client Self Report: Feeling really well. No pain with walking down steps. No issues with activites. Uses ice one daily.     Objective Measurements:  Objective Measure: tissue loading  Details: equal loading  Objective Measure: joint mobility     Objective Measure: MMT  Details: 4/5 MMT hamstrings          Goals:  Goal Identifier strength   Goal Description patient to have 5/5 strength for gluteals in order to stabilize hip and knee during walking, hiking.   Target Date 08/15/18   Date Met  18   Progress:     Goal Identifier joint mobility   Goal Description Patient to display normal and symmetrical pelvis and hip mechanics in order to walki and hike without knee pain.   Target Date 08/15/18   Date Met  18   Progress:       Progress Toward Goals:   Progress this reporting period: patient met all goals.          Plan:  Discharge from therapy.    Discharge:    Reason for Discharge: Patient has met all goals.    Equipment Issued: NA    Discharge Plan: Patient to continue home program.

## 2018-08-23 ENCOUNTER — OFFICE VISIT (OUTPATIENT)
Dept: FAMILY MEDICINE | Facility: OTHER | Age: 63
End: 2018-08-23
Attending: NURSE PRACTITIONER
Payer: COMMERCIAL

## 2018-08-23 VITALS
RESPIRATION RATE: 18 BRPM | DIASTOLIC BLOOD PRESSURE: 70 MMHG | HEIGHT: 65 IN | BODY MASS INDEX: 24.37 KG/M2 | TEMPERATURE: 98.3 F | HEART RATE: 68 BPM | WEIGHT: 146.3 LBS | SYSTOLIC BLOOD PRESSURE: 108 MMHG

## 2018-08-23 DIAGNOSIS — R39.89 URINARY PROBLEM: Primary | ICD-10-CM

## 2018-08-23 DIAGNOSIS — N30.00 ACUTE CYSTITIS WITHOUT HEMATURIA: ICD-10-CM

## 2018-08-23 LAB
ALBUMIN UR-MCNC: 100 MG/DL
APPEARANCE UR: CLEAR
BACTERIA #/AREA URNS HPF: ABNORMAL /HPF
BILIRUB UR QL STRIP: NEGATIVE
COLOR UR AUTO: YELLOW
GLUCOSE UR STRIP-MCNC: 100 MG/DL
HGB UR QL STRIP: ABNORMAL
KETONES UR STRIP-MCNC: NEGATIVE MG/DL
LEUKOCYTE ESTERASE UR QL STRIP: ABNORMAL
NITRATE UR QL: POSITIVE
PH UR STRIP: 7.5 PH (ref 5–9)
RBC #/AREA URNS AUTO: ABNORMAL /HPF
SOURCE: ABNORMAL
SP GR UR STRIP: 1.01 (ref 1–1.03)
UROBILINOGEN UR STRIP-ACNC: 2 EU/DL (ref 0.2–1)
WBC #/AREA URNS AUTO: ABNORMAL /HPF

## 2018-08-23 PROCEDURE — 87088 URINE BACTERIA CULTURE: CPT | Performed by: NURSE PRACTITIONER

## 2018-08-23 PROCEDURE — 87086 URINE CULTURE/COLONY COUNT: CPT | Performed by: NURSE PRACTITIONER

## 2018-08-23 PROCEDURE — 81001 URINALYSIS AUTO W/SCOPE: CPT | Performed by: NURSE PRACTITIONER

## 2018-08-23 PROCEDURE — 99213 OFFICE O/P EST LOW 20 MIN: CPT | Performed by: NURSE PRACTITIONER

## 2018-08-23 RX ORDER — PHENAZOPYRIDINE HYDROCHLORIDE 100 MG/1
100 TABLET, FILM COATED ORAL 3 TIMES DAILY PRN
Qty: 6 TABLET | Refills: 0 | Status: SHIPPED | OUTPATIENT
Start: 2018-08-23 | End: 2018-08-25

## 2018-08-23 RX ORDER — CIPROFLOXACIN 250 MG/1
250 TABLET, FILM COATED ORAL 2 TIMES DAILY
Qty: 6 TABLET | Refills: 0 | Status: SHIPPED | OUTPATIENT
Start: 2018-08-23 | End: 2018-11-06

## 2018-08-23 ASSESSMENT — PAIN SCALES - GENERAL: PAINLEVEL: NO PAIN (0)

## 2018-08-23 NOTE — NURSING NOTE
"Chief Complaint   Patient presents with     Urinary Problem       Initial /70 (BP Location: Right arm, Patient Position: Sitting, Cuff Size: Adult Regular)  Pulse 68  Temp 98.3  F (36.8  C) (Tympanic)  Resp 18  Ht 5' 5\" (1.651 m)  Wt 146 lb 4.8 oz (66.4 kg)  Breastfeeding? No  BMI 24.35 kg/m2 Estimated body mass index is 24.35 kg/(m^2) as calculated from the following:    Height as of this encounter: 5' 5\" (1.651 m).    Weight as of this encounter: 146 lb 4.8 oz (66.4 kg).  Medication Reconciliation: complete    Liv Gupta, AMINA   "

## 2018-08-23 NOTE — PROGRESS NOTES
"Nursing Notes:   Liv Gupta LPN  8/23/2018 10:14 AM  Addendum  Patient presents to the clinic for urinary problem. S/sx started last night and include urinary frequency/urgency, low back pain, urinary burning. Has AZO and left over pyridium that she has been taking since then.   Liv Gupta LPN............. August 23, 2018 10:07 AM       Liv Gupta LPN  8/23/2018 10:17 AM  Unsigned  Chief Complaint   Patient presents with     Urinary Problem       Initial /70 (BP Location: Right arm, Patient Position: Sitting, Cuff Size: Adult Regular)  Pulse 68  Temp 98.3  F (36.8  C) (Tympanic)  Resp 18  Ht 5' 5\" (1.651 m)  Wt 146 lb 4.8 oz (66.4 kg)  Breastfeeding? No  BMI 24.35 kg/m2 Estimated body mass index is 24.35 kg/(m^2) as calculated from the following:    Height as of this encounter: 5' 5\" (1.651 m).    Weight as of this encounter: 146 lb 4.8 oz (66.4 kg).  Medication Reconciliation: complete    Liv Gupta LPN     SUBJECTIVE:   Shirley Hollins is a 62 year old female who presents to clinic today for the following health issues:    URINARY TRACT SYMPTOMS      Duration: Last night    Description  dysuria, frequency and urgency    Intensity:  moderate    Accompanying signs and symptoms:  Fever/chills: no   Flank pain no   Nausea and vomiting: no   Vaginal symptoms: none  Abdominal/Pelvic Pain: no     History  History of frequent UTI's: no   History of kidney stones: no     Precipitating or alleviating factors: None    Therapies tried and outcome: pyridium  and increase fluid intake     Problem list and histories reviewed & adjusted, as indicated.  Additional history: as documented    Current Outpatient Prescriptions   Medication Sig Dispense Refill     acetaminophen (TYLENOL) 325 MG tablet Take 2 tablets (650 mg) by mouth every 4 hours as needed for other (mild pain) 100 tablet 0     Biotin 10 MG TABS tablet Take 10,000 mcg by mouth daily       bisacodyl (DULCOLAX) 10 " MG Suppository Place 1 suppository rectally daily as needed for other       Cholecalciferol (VITAMIN D3) 400 UNITS CAPS Take 1,000 Units by mouth       clonazePAM (KLONOPIN) 0.5 MG tablet Take 1 tablet (0.5 mg) by mouth 2 times daily as needed for anxiety . Taper down as needed. 60 tablet 5     CLONAZEPAM PO Take 0.5 mg by mouth 2 times daily        dibucaine (NUPERCAINAL) 1 % OINT ointment Apply topically 4 times daily as needed for pain       Emollient (CERAVE EX)        Emollient (CERAVE) LOTN Externally apply topically as needed       estradiol (ESTRACE) 0.5 MG tablet Take 0.5 tablets by mouth every other day       ESTRADIOL PO Take 0.5 mg by mouth daily        hydrOXYzine (ATARAX) 25 MG tablet Take 1 tablet (25 mg) by mouth every 6 hours as needed for itching (and nausea) 30 tablet 0     Lansoprazole (PREVACID PO) Take 20 mg by mouth 2 times daily        levothyroxine (SYNTHROID/LEVOTHROID) 100 MCG tablet Take 100 mcg by mouth daily       Levothyroxine Sodium (SYNTHROID PO) Take 100 mcg by mouth daily       lidocaine (LIDODERM) 5 % Patch Place 1 patch onto the skin For up to 12 hours within a 24-hours period.       naproxen (NAPROSYN) 500 MG tablet Take 1 tablet by mouth 2 times daily (with meals)       Omega-3 Fatty Acids (OMEGA-3 FISH OIL PO) Take 1 g by mouth       Omega-3 Fatty Acids (OMEGA-3 FISH OIL PO) Take 1 g by mouth daily       omeprazole (PRILOSEC) 20 MG CR capsule Take 1 capsule by mouth daily as needed For GI Upset. Dose reduction       ondansetron (ZOFRAN-ODT) 4 MG disintegrating tablet Take 1-2 tablets (4-8 mg) by mouth every 8 hours as needed for nausea Dissolve ON the tongue. 4 tablet 0     ranitidine (ZANTAC) 150 MG tablet Take 1 tablet by mouth daily       senna-docusate (SENOKOT-S;PERICOLACE) 8.6-50 MG per tablet Take 1-2 tablets by mouth 2 times daily Take while on oral narcotics to prevent or treat constipation. 30 tablet 0     VITAMIN D, CHOLECALCIFEROL, PO Take 1,000 Units by mouth  "daily       Allergies   Allergen Reactions     Trazodone      Other reaction(s): Headache, Hypertension  RCVS         ROS:  Notable findings in the HPI.       OBJECTIVE:     /70 (BP Location: Right arm, Patient Position: Sitting, Cuff Size: Adult Regular)  Pulse 68  Temp 98.3  F (36.8  C) (Tympanic)  Resp 18  Ht 5' 5\" (1.651 m)  Wt 146 lb 4.8 oz (66.4 kg)  Breastfeeding? No  BMI 24.35 kg/m2  Body mass index is 24.35 kg/(m^2).  GENERAL: healthy, alert and no distress  EYES: Eyes grossly normal to inspection  HENT: normal cephalic/atraumatic and oral mucous membranes moist  RESP: Without increased work of breathing.   ABDOMEN: soft, nontender, without hepatosplenomegaly or masses and bowel sounds normal  SKIN: no suspicious lesions or rashes  BACK: no CVA tenderness, no paralumbar tenderness    Diagnostic Test Results:  Results for orders placed or performed in visit on 08/23/18 (from the past 24 hour(s))   *UA reflex to Microscopic   Result Value Ref Range    Color Urine Yellow     Appearance Urine Clear     Glucose Urine 100 (A) NEG^Negative mg/dL    Bilirubin Urine Negative NEG^Negative    Ketones Urine Negative NEG^Negative mg/dL    Specific Gravity Urine 1.010 1.000 - 1.030    Blood Urine Large (A) NEG^Negative    pH Urine 7.5 5.0 - 9.0 pH    Protein Albumin Urine 100 (A) NEG^Negative mg/dL    Urobilinogen Urine 2.0 (H) 0.2 - 1.0 EU/dL    Nitrite Urine Positive (A) NEG^Negative    Leukocyte Esterase Urine Large (A) NEG^Negative    Source Midstream Urine    Urine Microscopic   Result Value Ref Range    WBC Urine 10-25 (A) OTO5^0 - 5 /HPF    RBC Urine 10-25 (A) OTO2^O - 2 /HPF    Bacteria Urine Few (A) NEG^Negative /HPF       ASSESSMENT/PLAN:     1. Urinary problem  - *UA reflex to Microscopic  - Urine Microscopic    2. Acute cystitis without hematuria  - ciprofloxacin (CIPRO) 250 MG tablet; Take 1 tablet (250 mg) by mouth 2 times daily  Dispense: 6 tablet; Refill: 0  - phenazopyridine (PYRIDIUM) 100 " MG tablet; Take 1 tablet (100 mg) by mouth 3 times daily as needed for urinary tract discomfort  Dispense: 6 tablet; Refill: 0  - Urine Culture Aerobic Bacterial      PLAN:    UTI Adult: She noted the last time resistance to bactrim and had to be put on Cipro. Opted to just start on Cipro this time.  Ciprofloxacin 250mg twice daily for 3 days    - Ensure you are drinking plenty of fluids, emptying your bladder when you feel the urge versus holding urine, after urinating you can bear-down to empty bladder completely, after peeing wipe front to back to avoid introducing bacteria towards vaginal area.   - Call or return to clinic prn if these symptoms worsen or fail to improve as anticipated.    Followup:    If not improving or if condition worsens, follow up with your Primary Care Provider    I explained my diagnostic considerations and recommendations to the patient, who voiced understanding and agreement with the treatment plan. All questions were answered. We discussed potential side effects of any prescribed or recommended therapies, as well as expectations for response to treatments. She was advised to contact PCP office if there is no improvement or worsening of conditions or symptoms.  If s/s worsen or persist, patient will either come back or follow up with PCP.    Disclaimer:  This note consists of words and symbols derived from keyboarding, dictation, or using voice recognition software. As a result, there may be errors in the script that have gone undetected. Please consider this when interpreting information found in this note.      Juliana Long NP, 8/23/2018 10:22 AM

## 2018-08-23 NOTE — MR AVS SNAPSHOT
After Visit Summary   8/23/2018    Shirley Hollins    MRN: [id number]           Patient Information     Date Of Birth          1955        Visit Information        Provider Department      8/23/2018 10:00 AM Juliana Long NP [facility]        Today's Diagnoses     Urinary problem    -  1    Acute cystitis without hematuria          Care Instructions      Urinary Tract Infections in Women  Urinary tract infections (UTIs) are most often caused by bacteria. These bacteria enter the urinary tract. The bacteria may come from outside the body. Or they may travel from the skin outside the rectum or vagina into the urethra. Female anatomy makes it easy for bacteria from the bowel to enter a woman s urinary tract, which is the most common source of UTI. This means women develop UTIs more often than men. Pain in or around the urinary tract is a common UTI symptom. But the only way to know for sure if you have a UTI for the healthcare provider to test your urine. The two tests that may be done are the urinalysis and urine culture.  Types of UTIs    Cystitis. A bladder infection (cystitis) is the most common UTI in women. You may have urgent or frequent urination. You may also have pain, burning when you urinate, and bloody urine.    Urethritis. This is an inflamed urethra, which is the tube that carries urine from the bladder to outside the body. You may have lower stomach or back pain. You may also have urgent or frequent urination.    Pyelonephritis. This is a kidney infection. If not treated, it can be serious and damage your kidneys. In severe cases, you may need to stay in the hospital. You may have a fever and lower back pain.  Medicines to treat a UTI  Most UTIs are treated with antibiotics. These kill the bacteria. The length of time you need to take them depends on the type of infection. It may be as short as 3 days. If you have repeated UTIs, you may need a low-dose  antibiotic for several months. Take antibiotics exactly as directed. Don t stop taking them until all of the medicine is gone. If you stop taking the antibiotic too soon, the infection may not go away. You may also develop a resistance to the antibiotic. This can make it much harder to treat.  Lifestyle changes to treat and prevent UTIs  The lifestyle changes below will help get rid of your UTI. They may also help prevent future UTIs.    Drink plenty of fluids. This includes water, juice, or other caffeine-free drinks. Fluids help flush bacteria out of your body.    Empty your bladder. Always empty your bladder when you feel the urge to urinate. And always urinate before going to sleep. Urine that stays in your bladder can lead to infection. Try to urinate before and after sex as well.    Practice good personal hygiene. Wipe yourself from front to back after using the toilet. This helps keep bacteria from getting into the urethra.    Use condoms during sex. These help prevent UTIs caused by sexually transmitted bacteria. Also don't use spermicides during sex. These can increase the risk for UTIs. Choose other forms of birth control instead. For women who tend to get UTIs after sex, a low-dose of a preventive antibiotic may be used. Be sure to discuss this option with your healthcare provider.    Follow up with your healthcare provider as directed. He or she may test to make sure the infection has cleared. If needed, more treatment may be started.                  Follow-ups after your visit        Who to contact     If you have questions or need follow up information about today's clinic visit or your schedule please contact [facility] directly at [phone].  Normal or non-critical lab and imaging results will be communicated to you by MyChart, letter or phone within 4 business days after the clinic has received the results. If you do not hear from us within 7 days, please contact the clinic  "through [employer] or phone. If you have a critical or abnormal lab result, we will notify you by phone as soon as possible.  Submit refill requests through [employer] or call your pharmacy and they will forward the refill request to us. Please allow 3 business days for your refill to be completed.          Additional Information About Your Visit        [employer] Information     [employer] gives you secure access to your electronic health record. If you see a primary care provider, you can also send messages to your care team and make appointments. If you have questions, please call your primary care clinic.  If you do not have a primary care provider, please call [phone] and they will assist you.        Care EveryWhere ID     This is your Care EveryWhere ID. This could be used by other organizations to access your [place] medical records  [id number]        Your Vitals Were     Pulse Temperature Respirations Height Breastfeeding? BMI (Body Mass Index)    68 98.3  F (36.8  C) (Tympanic) 18 5' 5\" (1.651 m) No 24.35 kg/m2       Blood Pressure from Last 3 Encounters:   08/23/18 108/70   01/16/18 108/68   11/03/17 102/66    Weight from Last 3 Encounters:   08/23/18 146 lb 4.8 oz (66.4 kg)   01/16/18 144 lb 9.6 oz (65.6 kg)   11/03/17 142 lb (64.4 kg)              We Performed the Following     *UA reflex to Microscopic          Today's Medication Changes          These changes are accurate as of 8/23/18 10:36 AM.  If you have any questions, ask your nurse or doctor.               Start taking these medicines.        Dose/Directions    ciprofloxacin 250 MG tablet   Commonly known as:  CIPRO   Used for:  Acute cystitis without hematuria   Started by:  Juliana Long NP        Dose:  250 mg   Take 1 tablet (250 mg) by mouth 2 times daily   Quantity:  6 tablet   Refills:  0            Where to get your medicines      These medications were sent to [facility] [address] [place] [address] [address] " [facility] [address] [address]     Phone:  [phone]     ciprofloxacin 250 MG tablet                Primary Care Provider Office Phone # Fax #    Sonia Mitchell [phone] [address] [facility] [address]        Equal Access to Services     VINICIO CRAIG : Hadii aad ku hadasho Soomaali, waaxda luqadaha, qaybta kaalmada adeegyada, waxay flo haydeb dolanmontyandrea gonzalez. So [facility] [phone].    ATENCIÓN: Si habla español, tiene a george disposición servicios gratuitos de asistencia lingüística. Llame al [phone].    We comply with applicable federal civil rights laws and [place] laws. We do not discriminate on the basis of race, color, national origin, age, disability, sex, sexual orientation, or gender identity.            Thank you!     Thank you for choosing [facility] AND [facility]  for your care. Our goal is always to provide you with excellent care. Hearing back from our patients is one way we can continue to improve our services. Please take a few minutes to complete the written survey that you may receive in the mail after your visit with us. Thank you!             Your Updated Medication List - Protect others around you: Learn how to safely use, store and throw away your medicines at www.disposemymeds.org.          This list is accurate as of 8/23/18 10:36 AM.  Always use your most recent med list.                   Brand Name Dispense Instructions for use Diagnosis    acetaminophen 325 MG tablet    TYLENOL    100 tablet    Take 2 tablets (650 mg) by mouth every 4 hours as needed for other (mild pain)    Bursitis of left hip       Biotin 10 MG Tabs tablet      Take 10,000 mcg by mouth daily        bisacodyl 10 MG Suppository    DULCOLAX     Place 1 suppository rectally daily as needed for other        CERAVE EX           CERAVE Lotn      Externally apply topically as needed        ciprofloxacin 250 MG tablet    CIPRO    6 tablet    Take 1 tablet  (250 mg) by mouth 2 times daily    Acute cystitis without hematuria       * CLONAZEPAM PO      Take 0.5 mg by mouth 2 times daily        * clonazePAM 0.5 MG tablet    klonoPIN    60 tablet    Take 1 tablet (0.5 mg) by mouth 2 times daily as needed for anxiety . Taper down as needed.    Anxiety       dibucaine 1 % Oint ointment    NUPERCAINAL     Apply topically 4 times daily as needed for pain        * ESTRADIOL PO      Take 0.5 mg by mouth daily        * estradiol 0.5 MG tablet    ESTRACE     Take 0.5 tablets by mouth every other day        hydrOXYzine 25 MG tablet    ATARAX    30 tablet    Take 1 tablet (25 mg) by mouth every 6 hours as needed for itching (and nausea)    Bursitis of left hip       lidocaine 5 % Patch    LIDODERM     Place 1 patch onto the skin For up to 12 hours within a 24-hours period.        naproxen 500 MG tablet    NAPROSYN     Take 1 tablet by mouth 2 times daily (with meals)        * OMEGA-3 FISH OIL PO      Take 1 g by mouth daily        * OMEGA-3 FISH OIL PO      Take 1 g by mouth        omeprazole 20 MG CR capsule    priLOSEC     Take 1 capsule by mouth daily as needed For GI Upset. Dose reduction        ondansetron 4 MG ODT tab    ZOFRAN-ODT    4 tablet    Take 1-2 tablets (4-8 mg) by mouth every 8 hours as needed for nausea Dissolve ON the tongue.    Bursitis of left hip       PREVACID PO      Take 20 mg by mouth 2 times daily        ranitidine 150 MG tablet    ZANTAC     Take 1 tablet by mouth daily        senna-docusate 8.6-50 MG per tablet    SENOKOT-S;PERICOLACE    30 tablet    Take 1-2 tablets by mouth 2 times daily Take while on oral narcotics to prevent or treat constipation.    Bursitis of left hip       * SYNTHROID PO      Take 100 mcg by mouth daily        * levothyroxine 100 MCG tablet    SYNTHROID/LEVOTHROID     Take 100 mcg by mouth daily        * VITAMIN D (CHOLECALCIFEROL) PO      Take 1,000 Units by mouth daily        * Vitamin D3 400 units Caps      Take 1,000 Units by  mouth        * Notice:  This list has 10 medication(s) that are the same as other medications prescribed for you. Read the directions carefully, and ask your doctor or other care provider to review them with you.

## 2018-08-23 NOTE — NURSING NOTE
Patient presents to the clinic for urinary problem. S/sx started last night and include urinary frequency/urgency, low back pain, urinary burning. Has AZO and left over pyridium that she has been taking since then.   Liv Gupta LPN............. August 23, 2018 10:07 AM

## 2018-08-26 LAB
BACTERIA SPEC CULT: ABNORMAL
SPECIMEN SOURCE: ABNORMAL

## 2018-11-06 ENCOUNTER — HOSPITAL ENCOUNTER (OUTPATIENT)
Dept: MAMMOGRAPHY | Facility: OTHER | Age: 63
Discharge: HOME | End: 2018-11-06
Attending: INTERNAL MEDICINE | Admitting: INTERNAL MEDICINE
Payer: COMMERCIAL

## 2018-11-06 ENCOUNTER — OFFICE VISIT (OUTPATIENT)
Dept: INTERNAL MEDICINE | Facility: OTHER | Age: 63
End: 2018-11-06
Attending: INTERNAL MEDICINE
Payer: COMMERCIAL

## 2018-11-06 VITALS
DIASTOLIC BLOOD PRESSURE: 74 MMHG | SYSTOLIC BLOOD PRESSURE: 102 MMHG | WEIGHT: 148.2 LBS | HEIGHT: 65 IN | HEART RATE: 68 BPM | BODY MASS INDEX: 24.69 KG/M2

## 2018-11-06 DIAGNOSIS — Z12.39 SCREENING BREAST EXAMINATION: ICD-10-CM

## 2018-11-06 DIAGNOSIS — E78.5 HYPERLIPIDEMIA, UNSPECIFIED HYPERLIPIDEMIA TYPE: ICD-10-CM

## 2018-11-06 DIAGNOSIS — M85.89 OSTEOPENIA OF MULTIPLE SITES: ICD-10-CM

## 2018-11-06 DIAGNOSIS — F41.0 PANIC ANXIETY SYNDROME: ICD-10-CM

## 2018-11-06 DIAGNOSIS — E03.9 HYPOTHYROIDISM, UNSPECIFIED TYPE: Primary | ICD-10-CM

## 2018-11-06 DIAGNOSIS — F41.9 ANXIETY: ICD-10-CM

## 2018-11-06 DIAGNOSIS — K21.9 GASTROESOPHAGEAL REFLUX DISEASE WITHOUT ESOPHAGITIS: ICD-10-CM

## 2018-11-06 LAB
ALBUMIN SERPL-MCNC: 4.4 G/DL (ref 3.5–5.7)
ALP SERPL-CCNC: 58 U/L (ref 34–104)
ALT SERPL W P-5'-P-CCNC: 22 U/L (ref 7–52)
ANION GAP SERPL CALCULATED.3IONS-SCNC: 8 MMOL/L (ref 3–14)
AST SERPL W P-5'-P-CCNC: 17 U/L (ref 13–39)
BILIRUB SERPL-MCNC: 0.5 MG/DL (ref 0.3–1)
BUN SERPL-MCNC: 15 MG/DL (ref 7–25)
CALCIUM SERPL-MCNC: 9.5 MG/DL (ref 8.6–10.3)
CHLORIDE SERPL-SCNC: 102 MMOL/L (ref 98–107)
CHOLEST SERPL-MCNC: 239 MG/DL
CO2 SERPL-SCNC: 29 MMOL/L (ref 21–31)
CREAT SERPL-MCNC: 0.72 MG/DL (ref 0.6–1.2)
DEPRECATED CALCIDIOL+CALCIFEROL SERPL-MC: 33.5 NG/ML
GFR SERPL CREATININE-BSD FRML MDRD: 83 ML/MIN/1.7M2
GLUCOSE SERPL-MCNC: 102 MG/DL (ref 70–105)
HDLC SERPL-MCNC: 54 MG/DL (ref 23–92)
LDLC SERPL CALC-MCNC: 154 MG/DL
NONHDLC SERPL-MCNC: 185 MG/DL
POTASSIUM SERPL-SCNC: 3.9 MMOL/L (ref 3.5–5.1)
PROT SERPL-MCNC: 7.1 G/DL (ref 6.4–8.9)
SODIUM SERPL-SCNC: 139 MMOL/L (ref 134–144)
T4 FREE SERPL-MCNC: 1.02 NG/DL (ref 0.6–1.6)
TRIGL SERPL-MCNC: 157 MG/DL
TSH SERPL DL<=0.05 MIU/L-ACNC: 2.38 IU/ML (ref 0.34–5.6)

## 2018-11-06 PROCEDURE — 84443 ASSAY THYROID STIM HORMONE: CPT | Performed by: INTERNAL MEDICINE

## 2018-11-06 PROCEDURE — 80061 LIPID PANEL: CPT | Performed by: INTERNAL MEDICINE

## 2018-11-06 PROCEDURE — 84439 ASSAY OF FREE THYROXINE: CPT | Performed by: INTERNAL MEDICINE

## 2018-11-06 PROCEDURE — 99396 PREV VISIT EST AGE 40-64: CPT | Performed by: INTERNAL MEDICINE

## 2018-11-06 PROCEDURE — 82306 VITAMIN D 25 HYDROXY: CPT | Performed by: INTERNAL MEDICINE

## 2018-11-06 PROCEDURE — 80053 COMPREHEN METABOLIC PANEL: CPT | Performed by: INTERNAL MEDICINE

## 2018-11-06 PROCEDURE — 36415 COLL VENOUS BLD VENIPUNCTURE: CPT | Performed by: INTERNAL MEDICINE

## 2018-11-06 PROCEDURE — 77067 SCR MAMMO BI INCL CAD: CPT

## 2018-11-06 RX ORDER — LEVOTHYROXINE SODIUM 100 UG/1
100 TABLET ORAL DAILY
Qty: 90 TABLET | Refills: 3 | Status: SHIPPED | OUTPATIENT
Start: 2018-11-06 | End: 2019-11-18

## 2018-11-06 RX ORDER — CLONAZEPAM 0.5 MG/1
0.5 TABLET ORAL 2 TIMES DAILY PRN
Qty: 60 TABLET | Refills: 5 | Status: SHIPPED | OUTPATIENT
Start: 2018-11-06 | End: 2019-10-24

## 2018-11-06 RX ORDER — CLONAZEPAM 0.5 MG/1
0.5 TABLET ORAL 2 TIMES DAILY PRN
Qty: 60 TABLET | Refills: 5 | COMMUNITY
Start: 2018-11-06 | End: 2018-11-06

## 2018-11-06 ASSESSMENT — PATIENT HEALTH QUESTIONNAIRE - PHQ9
5. POOR APPETITE OR OVEREATING: NOT AT ALL
SUM OF ALL RESPONSES TO PHQ QUESTIONS 1-9: 0

## 2018-11-06 ASSESSMENT — ENCOUNTER SYMPTOMS
TROUBLE SWALLOWING: 0
DIZZINESS: 0
DIAPHORESIS: 0
SHORTNESS OF BREATH: 0
WHEEZING: 0
NECK STIFFNESS: 0
ADENOPATHY: 0
DYSURIA: 0
FACIAL SWELLING: 0
BLOOD IN STOOL: 0
NERVOUS/ANXIOUS: 1
SORE THROAT: 0
EYE REDNESS: 0
JOINT SWELLING: 0
DIARRHEA: 0
NAUSEA: 0
FATIGUE: 0
AGITATION: 0
COUGH: 0
CONFUSION: 0
WEAKNESS: 0
BRUISES/BLEEDS EASILY: 0
SEIZURES: 0
FREQUENCY: 0
SINUS PRESSURE: 0
DIFFICULTY URINATING: 0
NUMBNESS: 0
VOMITING: 0
HALLUCINATIONS: 0
SLEEP DISTURBANCE: 0
FLANK PAIN: 0
EYE PAIN: 0
TREMORS: 0
RHINORRHEA: 0
CHILLS: 0
FEVER: 0
ABDOMINAL PAIN: 0
BACK PAIN: 0
CONSTIPATION: 0
NECK PAIN: 0
CHEST TIGHTNESS: 0
ABDOMINAL DISTENTION: 0
HEADACHES: 0
SINUS PAIN: 0
HEMATURIA: 0
PALPITATIONS: 0

## 2018-11-06 ASSESSMENT — ANXIETY QUESTIONNAIRES
3. WORRYING TOO MUCH ABOUT DIFFERENT THINGS: NOT AT ALL
GAD7 TOTAL SCORE: 0
7. FEELING AFRAID AS IF SOMETHING AWFUL MIGHT HAPPEN: NOT AT ALL
IF YOU CHECKED OFF ANY PROBLEMS ON THIS QUESTIONNAIRE, HOW DIFFICULT HAVE THESE PROBLEMS MADE IT FOR YOU TO DO YOUR WORK, TAKE CARE OF THINGS AT HOME, OR GET ALONG WITH OTHER PEOPLE: NOT DIFFICULT AT ALL
2. NOT BEING ABLE TO STOP OR CONTROL WORRYING: NOT AT ALL
5. BEING SO RESTLESS THAT IT IS HARD TO SIT STILL: NOT AT ALL
1. FEELING NERVOUS, ANXIOUS, OR ON EDGE: NOT AT ALL
6. BECOMING EASILY ANNOYED OR IRRITABLE: NOT AT ALL

## 2018-11-06 NOTE — MR AVS SNAPSHOT
After Visit Summary   11/6/2018    Shirley Hollins    MRN: [id number]           Patient Information     Date Of Birth          1955        Visit Information        Provider Department      11/6/2018 7:40 AM Dustin Bowden MD [facility]        Today's Diagnoses     Hypothyroidism, unspecified type    -  1    Anxiety        Panic anxiety syndrome        Hyperlipidemia, unspecified hyperlipidemia type        Gastroesophageal reflux disease without esophagitis        Osteopenia of multiple sites           Follow-ups after your visit        Your next 10 appointments already scheduled     Nov 06, 2018  9:00 AM CST   (Arrive by 8:45 AM)   MA SCREENING BILATERAL W/ CULLEN with GHMA2   [facility]    [address]  [facility] [address] [phone]           How do I prepare for my exam? (Food and drink instructions) No Food and Drink Restrictions.  How do I prepare for my exam? (Other instructions) Do not use any powder, lotion or deodorant under your arms or on your breast. If you do, we will ask you to remove it before your exam.  What should I wear: Wear comfortable, two-piece clothing.  How long does the exam take: Most scans will take 15 minutes.  What should I bring: Bring any previous mammograms from other facilities or have them mailed to the breast center.  Do I need a :  No  is needed.  What do I need to tell my doctor: If you have any allergies, tell your care team.  What should I do after the exam: No restrictions, You may resume normal activities.  What is this test: This test is an x-ray of the breast to look for breast disease. The breast is pressed between two plates to flatten and spread the tissue. An X-ray is taken of the breast from different angles.  Who should I call with questions: If you have any questions, please call the Imaging Department where you will have your exam.  "Directions, parking instructions, and other information is available on our website, [email or web address].  Other information about my exam Three-dimensional (3D) mammograms are available at [place] locations in [facility], [facility], [facility], [place], and [place].  Health locations include [place] and the [facility] and [facility] [place] in [place].  Benefits of 3D mammograms include: * Improved rate of cancer detection * Decreases your chance of having to go back for more tests, which means fewer: * \"False-positive\" results (This means that there is an abnormal area but it isn't cancer.) * Invasive testing procedures, such as a biopsy or surgery * Can provide clearer images of the breast if you have dense breast tissue.  *3D mammography is an optional exam that anyone can have with a 2D mammogram. It doesn't replace or take the place of a 2D mammogram. 2D mammograms remain an effective screening test for all women.  Not all insurance companies cover the cost of a 3D mammogram. Check with your insurance.              Future tests that were ordered for you today     Open Future Orders        Priority Expected Expires Ordered    Comprehensive Metabolic Panel Routine  11/6/2019 11/6/2018    Lipid Panel Routine  11/6/2019 11/6/2018    TSH Routine  11/6/2019 11/6/2018    T4, Free Routine  11/6/2019 11/6/2018    Vitamin D Total Routine  11/6/2019 11/6/2018            Who to contact     If you have questions or need follow up information about today's clinic visit or your schedule please contact [facility] directly at [phone].  Normal or non-critical lab and imaging results will be communicated to you by MyChart, letter or phone within 4 business days after the clinic has received the results. If you do not hear from us within 7 days, please contact the clinic through MyChart or phone. If you have a critical or abnormal lab result, we will notify you by phone as soon " "as possible.  Submit refill requests through [employer] or call your pharmacy and they will forward the refill request to us. Please allow 3 business days for your refill to be completed.          Additional Information About Your Visit        [employer] Information     [employer] gives you secure access to your electronic health record. If you see a primary care provider, you can also send messages to your care team and make appointments. If you have questions, please call your primary care clinic.  If you do not have a primary care provider, please call [phone] and they will assist you.        Care EveryWhere ID     This is your Care EveryWhere ID. This could be used by other organizations to access your [place] medical records  [id number]        Your Vitals Were     Pulse Height Breastfeeding? BMI (Body Mass Index)          68 5' 5\" (1.651 m) No 24.66 kg/m2         Blood Pressure from Last 3 Encounters:   11/06/18 102/74   08/23/18 108/70   01/16/18 108/68    Weight from Last 3 Encounters:   11/06/18 148 lb 3.2 oz (67.2 kg)   08/23/18 146 lb 4.8 oz (66.4 kg)   01/16/18 144 lb 9.6 oz (65.6 kg)                 Today's Medication Changes          These changes are accurate as of 11/6/18  8:13 AM.  If you have any questions, ask your nurse or doctor.               These medicines have changed or have updated prescriptions.        Dose/Directions    cholecalciferol 1000 UNIT tablet   Commonly known as:  vitamin D3   This may have changed:  Another medication with the same name was removed. Continue taking this medication, and follow the directions you see here.   Changed by:  Dustin Bowden MD        Dose:  1000 Units   Take 1 tablet (1,000 Units) by mouth daily   Quantity:  100 tablet   Refills:  3       clonazePAM 0.5 MG tablet   Commonly known as:  klonoPIN   This may have changed:    - medication strength  - when to take this  - reasons to take this   Used for:  Anxiety   Changed by:  Dustin Bowden MD        " Dose:  0.5 mg   Take 1 tablet (0.5 mg) by mouth 2 times daily as needed for anxiety   Quantity:  60 tablet   Refills:  5       estradiol 0.5 MG tablet   Commonly known as:  ESTRACE   This may have changed:  Another medication with the same name was removed. Continue taking this medication, and follow the directions you see here.   Changed by:  Dustin Bowden MD        Dose:  0.5 tablet   Take 0.5 tablets by mouth every other day   Refills:  0       levothyroxine 100 MCG tablet   Commonly known as:  SYNTHROID/LEVOTHROID   This may have changed:  Another medication with the same name was removed. Continue taking this medication, and follow the directions you see here.   Used for:  Hypothyroidism, unspecified type   Changed by:  Dustin Bowden MD        Dose:  100 mcg   Take 1 tablet (100 mcg) by mouth daily   Quantity:  90 tablet   Refills:  3       OMEGA-3 FISH OIL PO   This may have changed:  Another medication with the same name was removed. Continue taking this medication, and follow the directions you see here.   Changed by:  Dustin Bowden MD        Dose:  1 g   Take 1 g by mouth   Refills:  0         Stop taking these medicines if you haven't already. Please contact your care team if you have questions.     acetaminophen 325 MG tablet   Commonly known as:  TYLENOL   Stopped by:  Dustin Bowden MD           Biotin 10 MG Tabs tablet   Stopped by:  Dustin Bowden MD           CERAVE EX   Stopped by:  Dustin Bowden MD           CERAVE Lotn   Stopped by:  Dustin Bowden MD           ciprofloxacin 250 MG tablet   Commonly known as:  CIPRO   Stopped by:  Dustin Bowden MD           dibucaine 1 % Oint ointment   Commonly known as:  NUPERCAINAL   Stopped by:  Dustin Bowden MD           hydrOXYzine 25 MG tablet   Commonly known as:  ATARAX   Stopped by:  Dustin Bowden MD           lidocaine 5 % Patch   Commonly known as:  LIDODERM   Stopped by:  Dustin Bowden MD           naproxen 500 MG tablet   Commonly  known as:  NAPROSYN   Stopped by:  Dustin Bowden MD           omeprazole 20 MG CR capsule   Commonly known as:  priLOSEC   Stopped by:  Dustin Bowden MD           ondansetron 4 MG ODT tab   Commonly known as:  ZOFRAN-ODT   Stopped by:  Dustin Bowden MD           PREVACID PO   Stopped by:  Dustin Bowden MD           senna-docusate 8.6-50 MG per tablet   Commonly known as:  SENOKOT-S;PERICOLACE   Stopped by:  Dustin Bowden MD                Where to get your medicines      These medications were sent to [facility] [address] [facility] [address] [facility] [address] [address]     Phone:  [phone]     levothyroxine 100 MCG tablet         Some of these will need a paper prescription and others can be bought over the counter.  Ask your nurse if you have questions.     Bring a paper prescription for each of these medications     clonazePAM 0.5 MG tablet                Primary Care Provider Office Phone # Fax #    Sonia TOVAR DO Stephen [phone]       [address] [facility] [address]        Equal Access to Services     [facility] AH: Hadii aad ku hadasho Soomaali, waaxda luqadaha, qaybta kaalmada adeegyada, brenda rossi haydeb oakes . So [facility] [phone].    ATENCIÓN: Si habla español, tiene a george disposición servicios gratuitos de asistencia lingüística. Llame al [phone].    We comply with applicable federal civil rights laws and [place] laws. We do not discriminate on the basis of race, color, national origin, age, disability, sex, sexual orientation, or gender identity.            Thank you!     Thank you for choosing [facility] AND [facility]  for your care. Our goal is always to provide you with excellent care. Hearing back from our patients is one way we can continue to improve our services. Please take a few minutes to complete the written survey that you may receive in the mail after your visit with us. Thank  you!             Your Updated Medication List - Protect others around you: Learn how to safely use, store and throw away your medicines at www.disposemymeds.org.          This list is accurate as of 11/6/18  8:13 AM.  Always use your most recent med list.                   Brand Name Dispense Instructions for use Diagnosis    bisacodyl 10 MG Suppository    DULCOLAX     Place 1 suppository rectally daily as needed for other        cholecalciferol 1000 UNIT tablet    vitamin D3    100 tablet    Take 1 tablet (1,000 Units) by mouth daily        clonazePAM 0.5 MG tablet    klonoPIN    60 tablet    Take 1 tablet (0.5 mg) by mouth 2 times daily as needed for anxiety    Anxiety       estradiol 0.5 MG tablet    ESTRACE     Take 0.5 tablets by mouth every other day        levothyroxine 100 MCG tablet    SYNTHROID/LEVOTHROID    90 tablet    Take 1 tablet (100 mcg) by mouth daily    Hypothyroidism, unspecified type       OMEGA-3 FISH OIL PO      Take 1 g by mouth        ranitidine 150 MG tablet    ZANTAC     Take 1 tablet by mouth daily

## 2018-11-06 NOTE — PROGRESS NOTES
Chief Complaint:  This patient is here for a comprehensive review of their multiple medical problems, renewal of medications and update on necessary health maintenance issues.      HPI: This patient is here today for a yearly physical.  In most respects she is feeling well.  She has a history of panic and is on Klonopin.  She is trying to wean off of this.  She is currently taking 1 tablet every other day and she wonders what she needs to do to get off of this completely.  Now that she is retired, her panic episodes are much less frequent.    Patient also has treated hypothyroidism.  She is due for recheck on that.  She has no symptoms of under or over replacement other than some weight gain which is probably not related.  She will work on her weight.  She has a history of hyperlipidemia that is not treated and she would like to have her cholesterol checked today.    She has some nasal congestion so we talked about over the counter treatments if needed.  Her reflux is well controlled with ranitidine.  She is having a mammogram today.  She is up-to-date with colonoscopy.  She did have shingles a year ago when she is considering getting the ShingRx vaccine which I did recommend.  Tetanus and flu shot are up-to-date.  No other complaints or concerns.    Medications are reconciled.  Past medical history, past surgical history, family history and social histories are all reviewed and updated.    Past Medical History:   Diagnosis Date     Adenomatous colon polyp     2008     Benign lipomatous neoplasm of skin and subcutaneous tissue of extremity     6/15/2017     Diverticulosis of intestine without perforation or abscess without bleeding     No Comments Provided     Ectopic pregnancy without intrauterine pregnancy     1990     Gastro-esophageal reflux disease without esophagitis     No Comments Provided     Hyperlipidemia     No Comments Provided     Hypothyroidism     No Comments Provided     Nonspecific reaction to  tuberculin skin test without active tuberculosis     ,Normal CXR; treated with INH, incomplete treatment but mostly finished     Other bursal cyst, left hip     2016     Other complicated headache syndrome     ,Reversible cerebral vasoconstriction syndrome / Trazodone     Panic disorder without agoraphobia     No Comments Provided     Seborrheic dermatitis     2015     Transaminitis     with INH, resolved       Past Surgical History:   Procedure Laterality Date      SECTION           COLONOSCOPY      WNL     COLONOSCOPY      Adenomatous polyp     ESOPHAGOSCOPY, GASTROSCOPY, DUODENOSCOPY (EGD), COMBINED      ,gastritis and esophagitis; normal     HYSTERECTOMY TOTAL ABDOMINAL      ,with BSO (one ovary at a time)     LAPAROSCOPIC CHOLECYSTECTOMY           OOPHORECTOMY      ,right     OTHER SURGICAL HISTORY      ,[id number],LYSIS OF ADHESIONS     OTHER SURGICAL HISTORY      02/15/2016,[id number],OTHER,Left,L hip/trochanteric bursa. Large inflamed synovial cyst and bursa. [facility] Zhou [facility]     TONSILLECTOMY, ADENOIDECTOMY, COMBINED             Current Outpatient Prescriptions   Medication Sig Dispense Refill     bisacodyl (DULCOLAX) 10 MG Suppository Place 1 suppository rectally daily as needed for other       cholecalciferol (VITAMIN D3) 1000 UNIT tablet Take 1 tablet (1,000 Units) by mouth daily 100 tablet 3     clonazePAM (KLONOPIN) 0.5 MG tablet Take 1 tablet (0.5 mg) by mouth 2 times daily as needed for anxiety 60 tablet 5     estradiol (ESTRACE) 0.5 MG tablet Take 0.5 tablets by mouth every other day       levothyroxine (SYNTHROID/LEVOTHROID) 100 MCG tablet Take 100 mcg by mouth daily       Omega-3 Fatty Acids (OMEGA-3 FISH OIL PO) Take 1 g by mouth       ranitidine (ZANTAC) 150 MG tablet Take 1 tablet by mouth daily       [DISCONTINUED] clonazePAM (KLONOPIN) 0.5 MG tablet Take 1 tablet (0.5 mg) by mouth 2 times daily as needed for anxiety . Taper down  as needed. 60 tablet 5     [DISCONTINUED] CLONAZEPAM PO Take 0.5 mg by mouth 2 times daily        [DISCONTINUED] ESTRADIOL PO Take 0.5 mg by mouth daily          Allergies   Allergen Reactions     Trazodone      Other reaction(s): Headache, Hypertension  RCVS       Family History   Problem Relation Age of Onset     Other - See Comments Mother      Cirrhosis, secondary to CASTILLO     Diabetes Mother      Cancer Father      Cancer,Step father, cancer of esophagus     Cancer Other      Cancer,Lung cancer, both smokers     Thyroid Disease Maternal Grandmother      Thyroid Disease     Cancer Maternal Aunt      Cancer,Pancreatic cancer     Breast Cancer No family hx of      Cancer-breast       Social History     Social History     Marital status:      Spouse name: N/A     Number of children: N/A     Years of education: N/A     Occupational History     Not on file.     Social History Main Topics     Smoking status: Former Smoker     Packs/day: 0.15     Years: 20.00     Types: Cigarettes     Quit date: 1/1/2005     Smokeless tobacco: Never Used     Alcohol use 0.0 oz/week      Comment: Occasional     Drug use: No      Comment: Drug use: Nono IV drug use     Sexual activity: Yes     Partners: Male      Comment: Birth control method: monogamous     Other Topics Concern     Not on file     Social History Narrative     in 2007, remarried 2014.  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  Les Hollins   Two sons - one in [place] and one in [place], [place].  Lives on [address].       Review of Systems   Constitutional: Negative for chills, diaphoresis, fatigue and fever.   HENT: Positive for congestion. Negative for ear pain, facial swelling, mouth sores, nosebleeds, rhinorrhea, sinus pain, sinus pressure, sore throat and trouble swallowing.    Eyes: Negative for pain, redness and visual disturbance.   Respiratory: Negative for cough, chest tightness, shortness of breath and  wheezing.    Cardiovascular: Negative for chest pain, palpitations and leg swelling.   Gastrointestinal: Negative for abdominal distention, abdominal pain, blood in stool, constipation, diarrhea, nausea and vomiting.   Endocrine: Negative for cold intolerance and heat intolerance.   Genitourinary: Negative for difficulty urinating, dysuria, flank pain, frequency, hematuria, pelvic pain, vaginal bleeding, vaginal discharge and vaginal pain.   Musculoskeletal: Negative for back pain, gait problem, joint swelling, neck pain and neck stiffness.   Skin: Negative for rash.   Neurological: Negative for dizziness, tremors, seizures, syncope, weakness, numbness and headaches.   Hematological: Negative for adenopathy. Does not bruise/bleed easily.   Psychiatric/Behavioral: Negative for agitation, confusion, hallucinations and sleep disturbance. The patient is nervous/anxious.        Physical Exam   Constitutional: She is oriented to person, place, and time. She appears well-developed and well-nourished. No distress.   HENT:   Head: Normocephalic.   Right Ear: External ear normal.   Left Ear: External ear normal.   Mouth/Throat: Oropharynx is clear and moist. No oropharyngeal exudate.   Eyes: Conjunctivae are normal. Pupils are equal, round, and reactive to light.   Neck: Normal range of motion. Neck supple. Normal carotid pulses and no JVD present. Carotid bruit is not present. No tracheal deviation present. No thyromegaly present.   Cardiovascular: Normal rate, regular rhythm and normal heart sounds.  Exam reveals no gallop and no friction rub.    No murmur heard.  Pulmonary/Chest: Effort normal and breath sounds normal. No respiratory distress. She has no wheezes. She has no rales. Right breast exhibits no inverted nipple, no mass, no nipple discharge, no skin change and no tenderness. Left breast exhibits no inverted nipple, no mass, no nipple discharge, no skin change and no tenderness.   Abdominal: Soft. Bowel sounds are  normal. She exhibits no distension and no mass. There is no tenderness. There is no rebound.   Musculoskeletal: Normal range of motion. She exhibits no edema.   Lymphadenopathy:     She has no cervical adenopathy.   Neurological: She is alert and oriented to person, place, and time. She has normal reflexes. No cranial nerve deficit. Coordination normal.   Skin: Skin is warm and dry. No rash noted. She is not diaphoretic.   Psychiatric: She has a normal mood and affect. Her behavior is normal.   Nursing note and vitals reviewed.      Assessment:      ICD-10-CM    1. Hypothyroidism, unspecified type E03.9    2. Anxiety F41.9 clonazePAM (KLONOPIN) 0.5 MG tablet   3. Panic anxiety syndrome F41.0    4. Hyperlipidemia, unspecified hyperlipidemia type E78.5    5. Gastroesophageal reflux disease without esophagitis K21.9         Plan: She appears to be doing well at this time.  She will continue with her current medications although she will try to wean off of the Klonopin as able.  She will discontinue fish oil as there is no benefit with this.  We also discussed her estrogen therapy, she might want to consider weaning off of this.  Bone density assessment would be reasonable at this point in time as she tells me that she did have some osteopenia in the past, she will consider this.  Complete lab drawn and pending, I will send her letter with the results.  Medication refills were done.  She will consider getting the shingles vaccine.  Everything else appears to be up-to-date and acceptable.  Follow-up will be as needed.

## 2018-11-06 NOTE — LETTER
November 6, 2018      Shirley Hollins  [id number] [address] [facility] [id number]        Dear Shirley,    Below are the results of your recent labs:    Results for orders placed or performed in visit on 11/06/18   Comprehensive Metabolic Panel   Result Value Ref Range    Sodium 139 134 - 144 mmol/L    Potassium 3.9 3.5 - 5.1 mmol/L    Chloride 102 98 - 107 mmol/L    Carbon Dioxide 29 21 - 31 mmol/L    Anion Gap 8 3 - 14 mmol/L    Glucose 102 70 - 105 mg/dL    Urea Nitrogen 15 7 - 25 mg/dL    Creatinine 0.72 0.60 - 1.20 mg/dL    GFR Estimate 83 >60 mL/min/1.7m2    GFR Estimate If Black >90 >60 mL/min/1.7m2    Calcium 9.5 8.6 - 10.3 mg/dL    Bilirubin Total 0.5 0.3 - 1.0 mg/dL    Albumin 4.4 3.5 - 5.7 g/dL    Protein Total 7.1 6.4 - 8.9 g/dL    Alkaline Phosphatase 58 34 - 104 U/L    ALT 22 7 - 52 U/L    AST 17 13 - 39 U/L   Lipid Panel   Result Value Ref Range    Cholesterol 239 (H) <200 mg/dL    Triglycerides 157 (H) <150 mg/dL    HDL Cholesterol 54 23 - 92 mg/dL    LDL Cholesterol Calculated 154 (H) <100 mg/dL    Non HDL Cholesterol 185 (H) <130 mg/dL   TSH   Result Value Ref Range    Thyrotropin 2.38 0.34 - 5.60 IU/mL   T4, Free   Result Value Ref Range    T4 Free 1.02 0.60 - 1.60 ng/dL   Vitamin D Total   Result Value Ref Range    Vitamin D Total 33.5 ng/mL        Overall, your blood tests look acceptable.  Your cholesterol is moderately elevated so continue to work on a healthy diet as well as regular exercise.  Your vitamin D level is at the lower level of normal so I do want you to continue taking a vitamin D supplement on a daily basis.  Your thyroid tests are normal.    Some day it might be a good idea for you to get a CAT scan done on your heart to see whether or not you are beginning to develop hardening of the arteries.  If this were starting, we would want to put you on a cholesterol pill.  Your insurance will not cover this test but it is quite reasonable at only $100.  If you would like to have this  ordered, call the clinic and I will get it scheduled for you.    Sincerely,        Dustin Bowden MD  Internal Medicine  [facility]

## 2018-11-06 NOTE — NURSING NOTE
"The patient is here today to get a refill on her medications.  Corrina Mora LPN on 11/6/2018 at 7:40 AM  Chief Complaint   Patient presents with     Recheck Medication       Initial /74 (BP Location: Right arm, Patient Position: Sitting, Cuff Size: Adult Regular)  Pulse 68  Ht 5' 5\" (1.651 m)  Wt 148 lb 3.2 oz (67.2 kg)  Breastfeeding? No  BMI 24.66 kg/m2 Estimated body mass index is 24.66 kg/(m^2) as calculated from the following:    Height as of this encounter: 5' 5\" (1.651 m).    Weight as of this encounter: 148 lb 3.2 oz (67.2 kg).  Medication Reconciliation: complete    Corrina Mora LPN    "

## 2018-11-07 ASSESSMENT — ANXIETY QUESTIONNAIRES: GAD7 TOTAL SCORE: 0

## 2018-11-13 ENCOUNTER — HOSPITAL ENCOUNTER (OUTPATIENT)
Dept: MAMMOGRAPHY | Facility: OTHER | Age: 63
End: 2018-11-13
Attending: INTERNAL MEDICINE
Payer: COMMERCIAL

## 2018-11-13 DIAGNOSIS — R92.8 ABNORMAL FINDING ON BREAST IMAGING: ICD-10-CM

## 2018-11-13 PROCEDURE — 77065 DX MAMMO INCL CAD UNI: CPT

## 2019-05-06 ENCOUNTER — OFFICE VISIT (OUTPATIENT)
Dept: INTERNAL MEDICINE | Facility: OTHER | Age: 64
End: 2019-05-06
Attending: INTERNAL MEDICINE
Payer: COMMERCIAL

## 2019-05-06 VITALS
RESPIRATION RATE: 16 BRPM | BODY MASS INDEX: 24.36 KG/M2 | SYSTOLIC BLOOD PRESSURE: 110 MMHG | DIASTOLIC BLOOD PRESSURE: 70 MMHG | WEIGHT: 146.4 LBS | HEART RATE: 80 BPM | TEMPERATURE: 97.4 F

## 2019-05-06 DIAGNOSIS — M70.22 OLECRANON BURSITIS OF LEFT ELBOW: Primary | ICD-10-CM

## 2019-05-06 PROCEDURE — 99213 OFFICE O/P EST LOW 20 MIN: CPT | Performed by: INTERNAL MEDICINE

## 2019-05-06 RX ORDER — CEFDINIR 300 MG/1
300 CAPSULE ORAL 2 TIMES DAILY
Qty: 14 CAPSULE | Refills: 0 | Status: SHIPPED | OUTPATIENT
Start: 2019-05-06 | End: 2019-11-18

## 2019-05-06 ASSESSMENT — PAIN SCALES - GENERAL: PAINLEVEL: SEVERE PAIN (6)

## 2019-05-06 NOTE — NURSING NOTE
Chief Complaint   Patient presents with     Elbow Pain     Left elbow burisitiis   Patient presents to the clinic today with pain in the left elbow     Medication Reconciliation: completed   Elsy Calloway LPN  5/6/2019 8:02 AM

## 2019-05-06 NOTE — PATIENT INSTRUCTIONS
Your symptoms are most consistent with a bacterial infection.    Take your antibiotics asprescribed. Increase water intake.    Recommend eating yogurt/kefir or taking probiotics 1-2 times daily while on antibiotics     Call if symptoms do not improve in a couple days or if you develop any medication reactions.    General  - get extra rest  - drink plenty of fluid  - avoid tobacco products    You will need to be evaluated if you start to experience:   Fever higher than 102.5 F (39.2 C)   Worsening of current symptoms  Sudden and severe pain in the face and head or troubleseeing or thinking clearly   Swelling or redness around 1 or both eyes   Trouble breathing, chest pain or a stiff neck    * If you are a smoker, try to quit *

## 2019-05-24 ENCOUNTER — TELEPHONE (OUTPATIENT)
Dept: INTERNAL MEDICINE | Facility: OTHER | Age: 64
End: 2019-05-24

## 2019-05-24 ENCOUNTER — NURSE TRIAGE (OUTPATIENT)
Dept: INTERNAL MEDICINE | Facility: OTHER | Age: 64
End: 2019-05-24

## 2019-05-24 NOTE — TELEPHONE ENCOUNTER
"Patient feels a UTI \"coming on\" in last 1 1/2 days. Reports Hx of UTI. Some frequency, no fever or burining. Denies pain. Wanted to ask if PCP would order treatment due to going out of town for weekend. PCP is out, explained a visit would be needed.     Recommended [facility], but she didn't want to go there. Discussed drinking more fluids and cranberry juice. She will monitor and go in if needed. Umu Khan RN on 5/24/2019 at 1:32 PM      "

## 2019-10-24 DIAGNOSIS — F41.9 ANXIETY: ICD-10-CM

## 2019-10-28 RX ORDER — CLONAZEPAM 0.5 MG/1
TABLET ORAL
Qty: 60 TABLET | Refills: 5 | Status: SHIPPED | OUTPATIENT
Start: 2019-10-28 | End: 2020-08-13

## 2019-10-28 NOTE — TELEPHONE ENCOUNTER
Routing refill request to provider for review/approval because:  Drug not on the [facility] refill protocol   LOV; 5/6/19  Jerri Gardiner RN on 10/28/2019 at 11:51 AM

## 2019-11-18 ENCOUNTER — MYC MEDICAL ADVICE (OUTPATIENT)
Dept: INTERNAL MEDICINE | Facility: OTHER | Age: 64
End: 2019-11-18

## 2019-11-18 ENCOUNTER — HOSPITAL ENCOUNTER (OUTPATIENT)
Dept: MAMMOGRAPHY | Facility: OTHER | Age: 64
Discharge: HOME | End: 2019-11-18
Attending: INTERNAL MEDICINE | Admitting: INTERNAL MEDICINE
Payer: COMMERCIAL

## 2019-11-18 ENCOUNTER — OFFICE VISIT (OUTPATIENT)
Dept: INTERNAL MEDICINE | Facility: OTHER | Age: 64
End: 2019-11-18
Attending: INTERNAL MEDICINE
Payer: COMMERCIAL

## 2019-11-18 VITALS
OXYGEN SATURATION: 98 % | HEIGHT: 65 IN | RESPIRATION RATE: 16 BRPM | TEMPERATURE: 97.3 F | HEART RATE: 66 BPM | WEIGHT: 140 LBS | BODY MASS INDEX: 23.32 KG/M2 | DIASTOLIC BLOOD PRESSURE: 78 MMHG | SYSTOLIC BLOOD PRESSURE: 122 MMHG

## 2019-11-18 DIAGNOSIS — D72.819 LEUKOPENIA, UNSPECIFIED TYPE: Primary | ICD-10-CM

## 2019-11-18 DIAGNOSIS — E03.9 HYPOTHYROIDISM, UNSPECIFIED TYPE: Primary | ICD-10-CM

## 2019-11-18 DIAGNOSIS — Z79.899 HIGH RISK MEDICATION USE: ICD-10-CM

## 2019-11-18 DIAGNOSIS — Z12.31 VISIT FOR SCREENING MAMMOGRAM: ICD-10-CM

## 2019-11-18 DIAGNOSIS — M25.521 RIGHT ELBOW PAIN: ICD-10-CM

## 2019-11-18 DIAGNOSIS — L60.0 INGROWN TOENAIL: ICD-10-CM

## 2019-11-18 DIAGNOSIS — Z13.1 SCREENING FOR DIABETES MELLITUS: ICD-10-CM

## 2019-11-18 DIAGNOSIS — E78.2 MIXED HYPERLIPIDEMIA: ICD-10-CM

## 2019-11-18 DIAGNOSIS — F41.0 PANIC ANXIETY SYNDROME: ICD-10-CM

## 2019-11-18 DIAGNOSIS — R09.82 PND (POST-NASAL DRIP): ICD-10-CM

## 2019-11-18 DIAGNOSIS — R73.09 ELEVATED GLUCOSE: ICD-10-CM

## 2019-11-18 LAB
ALBUMIN SERPL-MCNC: 5.3 G/DL (ref 3.5–5.7)
ALP SERPL-CCNC: 68 U/L (ref 34–104)
ALT SERPL W P-5'-P-CCNC: 18 U/L (ref 7–52)
ANION GAP SERPL CALCULATED.3IONS-SCNC: 8 MMOL/L (ref 3–14)
AST SERPL W P-5'-P-CCNC: 20 U/L (ref 13–39)
BILIRUB SERPL-MCNC: 0.5 MG/DL (ref 0.3–1)
BUN SERPL-MCNC: 13 MG/DL (ref 7–25)
CALCIUM SERPL-MCNC: 10.1 MG/DL (ref 8.6–10.3)
CHLORIDE SERPL-SCNC: 102 MMOL/L (ref 98–107)
CHOLEST SERPL-MCNC: 216 MG/DL
CO2 SERPL-SCNC: 27 MMOL/L (ref 21–31)
CREAT SERPL-MCNC: 0.69 MG/DL (ref 0.6–1.2)
ERYTHROCYTE [DISTWIDTH] IN BLOOD BY AUTOMATED COUNT: 12 % (ref 10–15)
GFR SERPL CREATININE-BSD FRML MDRD: 87 ML/MIN/{1.73_M2}
GLUCOSE SERPL-MCNC: 108 MG/DL (ref 70–105)
HCT VFR BLD AUTO: 42.4 % (ref 35–47)
HDLC SERPL-MCNC: 49 MG/DL (ref 23–92)
HGB BLD-MCNC: 13.9 G/DL (ref 11.7–15.7)
LDLC SERPL CALC-MCNC: 142 MG/DL
MCH RBC QN AUTO: 31.1 PG (ref 26.5–33)
MCHC RBC AUTO-ENTMCNC: 32.8 G/DL (ref 31.5–36.5)
MCV RBC AUTO: 95 FL (ref 78–100)
NONHDLC SERPL-MCNC: 167 MG/DL
PLATELET # BLD AUTO: 276 10E9/L (ref 150–450)
POTASSIUM SERPL-SCNC: 4 MMOL/L (ref 3.5–5.1)
PROT SERPL-MCNC: 8.7 G/DL (ref 6.4–8.9)
RBC # BLD AUTO: 4.47 10E12/L (ref 3.8–5.2)
SODIUM SERPL-SCNC: 137 MMOL/L (ref 134–144)
TRIGL SERPL-MCNC: 126 MG/DL
TSH SERPL DL<=0.05 MIU/L-ACNC: 0.73 IU/ML (ref 0.34–5.6)
WBC # BLD AUTO: 3.4 10E9/L (ref 4–11)

## 2019-11-18 PROCEDURE — 84443 ASSAY THYROID STIM HORMONE: CPT | Mod: ZL | Performed by: INTERNAL MEDICINE

## 2019-11-18 PROCEDURE — 90686 IIV4 VACC NO PRSV 0.5 ML IM: CPT | Performed by: INTERNAL MEDICINE

## 2019-11-18 PROCEDURE — 90471 IMMUNIZATION ADMIN: CPT | Performed by: INTERNAL MEDICINE

## 2019-11-18 PROCEDURE — 99396 PREV VISIT EST AGE 40-64: CPT | Mod: 25 | Performed by: INTERNAL MEDICINE

## 2019-11-18 PROCEDURE — 36415 COLL VENOUS BLD VENIPUNCTURE: CPT | Mod: ZL | Performed by: INTERNAL MEDICINE

## 2019-11-18 PROCEDURE — 77063 BREAST TOMOSYNTHESIS BI: CPT

## 2019-11-18 PROCEDURE — 80061 LIPID PANEL: CPT | Mod: ZL | Performed by: INTERNAL MEDICINE

## 2019-11-18 PROCEDURE — 85027 COMPLETE CBC AUTOMATED: CPT | Mod: ZL | Performed by: INTERNAL MEDICINE

## 2019-11-18 PROCEDURE — 80053 COMPREHEN METABOLIC PANEL: CPT | Mod: ZL | Performed by: INTERNAL MEDICINE

## 2019-11-18 RX ORDER — LEVOTHYROXINE SODIUM 100 UG/1
100 TABLET ORAL DAILY
Qty: 90 TABLET | Refills: 3 | Status: SHIPPED | OUTPATIENT
Start: 2019-11-18 | End: 2020-11-19

## 2019-11-18 RX ORDER — CELECOXIB 50 MG/1
50 CAPSULE ORAL 2 TIMES DAILY PRN
Qty: 60 CAPSULE | Refills: 1 | Status: SHIPPED | OUTPATIENT
Start: 2019-11-18 | End: 2020-10-16

## 2019-11-18 ASSESSMENT — PAIN SCALES - GENERAL: PAINLEVEL: NO PAIN (0)

## 2019-11-18 ASSESSMENT — MIFFLIN-ST. JEOR: SCORE: 1192.98

## 2019-11-18 NOTE — PROGRESS NOTES
Chief Complaint   Patient presents with     Annual Visit         HPI: Ms. Hollins is a 63 year old female who presents today for yearly physical.  She overall is feeling good.      She has been having some increased mucus overall.  He does have a hard time clearing her throat and has had some postnasal drainage.  She is curious what can be used for this.    She has a history of anxiety.  She did try to come off of her clonazepam and was able to go down to once daily however when she tried to stop her last dose she has found difficulty doing this due to increased panic and anxiety.  She continues on 0.5 mg at night.    She has a history of hypothyroidism and is on levothyroxine.  She does need a refill of her medication.  She denies any signs of high or low thyroid.    She has an ingrown toenail.  It has caused some discomfort and repeatedly causes issues.  She is curious what she should do with this.    She has had right elbow pain for the past few months.  She feels it with any movement of the elbow.  She has been doing some massage.  She tried naproxen for a few weeks.  This did help but when she stopped it it came back.  She has been doing some stretches at night.    She is due for diabetes screening.  She is due for recheck of her hyperlipidemia after losing almost 10 pounds over the last year.  She is up-to-date on her colonoscopy.  She has a mammogram today.    History is discussed and updated on 11/18/2019 with patient.  It is current to the best of my knowledge as below.    Past Medical History:   Diagnosis Date     Adenomatous colon polyp 2008     Benign lipomatous neoplasm of skin and subcutaneous tissue of extremity 06/15/2017     Diverticulosis of intestine without perforation or abscess without bleeding      Ectopic pregnancy without intrauterine pregnancy 1990     Gastro-esophageal reflux disease without esophagitis      Hyperlipidemia      Hypothyroidism      Nonspecific reaction to tuberculin skin  test without active tuberculosis     ,Normal CXR; treated with INH, incomplete treatment but mostly finished     Other bursal cyst, left hip 2016     Other complicated headache syndrome     ,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 Large inflamed synovial cyst and bursa. [facility] Dr. Epperson     COLONOSCOPY      WNL     COLONOSCOPY      Adenomatous polyp     ESOPHAGOSCOPY, GASTROSCOPY, DUODENOSCOPY (EGD), COMBINED      ,gastritis and esophagitis; normal     HYSTERECTOMY TOTAL ABDOMINAL      with BSO (one ovary at a time)     LAPAROSCOPIC CHOLECYSTECTOMY       OOPHORECTOMY Right      TONSILLECTOMY, ADENOIDECTOMY, COMBINED           Current Outpatient Medications   Medication Sig Dispense Refill     cholecalciferol (VITAMIN D3) 1000 UNIT tablet Take 1 tablet (1,000 Units) by mouth daily 100 tablet 3     clonazePAM (KLONOPIN) 0.5 MG tablet TAKE 1 TABLET BY MOUTH TWICE DAILY AS NEEDED FOR ANXIETY 60 tablet 5     levothyroxine (SYNTHROID/LEVOTHROID) 100 MCG tablet Take 1 tablet (100 mcg) by mouth daily 90 tablet 3     NEW  mcg CBD Oil nightly as needed for sleep       ranitidine (ZANTAC) 150 MG tablet Take 1 tablet by mouth daily         Allergies   Allergen Reactions     Trazodone      Other reaction(s): Headache, Hypertension  RCVS        Family History   Problem Relation Age of Onset     Other - See Comments Mother         Cirrhosis, secondary to CASTILLO     Diabetes Mother      Cancer Father         Step father, cancer of esophagus     Cancer Other         Cancer,Lung cancer, both smokers     Thyroid Disease Maternal Grandmother      Cancer  "Maternal Aunt         Cancer,Pancreatic cancer     Breast Cancer No family hx of         Cancer-breast       Family Status   Relation Name Status     Mo   at age 69        Cirrhosis liver, secondary to CASTILLO     Fa Step father  at age 49        Cancer of esophagus     OTHER 2 grandparents         Lung cancer; smokers     Sis 1 Alive     Sis 2 Alive     Bro 1 Alive     Bro 2 Alive     OTHER 2 grandparents (Not Specified)     MGMo  (Not Specified)     MAunt       Neg  (Not Specified)        Social History     Tobacco Use     Smoking status: Former Smoker     Packs/day: 0.15     Years: 20.00     Pack years: 3.00     Types: Cigarettes     Last attempt to quit: 2005     Years since quittin.8     Smokeless tobacco: Never Used   Substance Use Topics     Alcohol use: Yes     Alcohol/week: 0.0 standard drinks     Comment: Occasional       Social History     Social History Narrative     in , remarried .  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  Les Hollins   Two sons - one in [place] and one in [place], [place].  Lives on [address].             ROS  GEN:-fevers/-chills/-night sweats/+wt change - 8 lb loss intentional  NEURO: -headaches/-vision changes  EARS: -hearing changes/-tinnitus  NOSE: -drainage/-congestion  MOUTH/THROAT: - sore throat/-dysphagia/-sores  LUNGS: -sob/-cough  CARDIOVASCULAR: -cp/-palpitations  GI: -pain/-change in bowels/-bloody stools  : -change in bladder/-vaginal discharge or bleeding  ENDOCRINE: -skin or hair changes  HEMATOLOGIC/LYMPHATIC: -swollen nodes  SKIN: -rashes/-lesions  MSK/RHEUM: +joint pain/-swelling  NEURO: -weakness/-parasthesias  PSYCH:-anhedonia/-depression/+anxiety     EXAM:   /78 (BP Location: Right arm, Patient Position: Sitting, Cuff Size: Adult Regular)   Pulse 66   Temp 97.3  F (36.3  C) (Tympanic)   Resp 16   Ht 1.638 m (5' 4.5\")   Wt 63.5 kg (140 lb)   SpO2 98%   " "Breastfeeding No   BMI 23.66 kg/m    Estimated body mass index is 23.66 kg/m  as calculated from the following:    Height as of this encounter: 1.638 m (5' 4.5\").    Weight as of this encounter: 63.5 kg (140 lb).      GEN: Vitals reviewed. Healthy appearing. Patient is in no acute distress. Cooperative with exam.  HEENT: Normocephalic atraumatic.  Normal external eye, conjunctiva, lids, cornea with no scleral icterus or conjunctival erythema. Pupils equally round. Oropharynx with no erythema or exudates. Dentition adequate.  EACs clear bilat, TM gray with normal landmarks.  NECK: Supple; no thyromegaly or masses noted.  No cervical or supraclavicular lymphadenopathy.  CV: Heart regular in rate and rhythm with no murmur.    LUNGS: Lungs clear to auscultation bilaterally.  Chest rise equal bilaterally.  No accessory muscle use.  ABD:  Soft, nontender, and nondistended.  No rebound. Bowel sounds positive.  SKIN: Warm and dry to touch.  No rash on face, arms and legs.  EXT: No clubbing or cyanosis.  No peripheral edema.  Toe inspected.  Minimally tender to palpation.  No significant redness or swelling.  NEURO: Alert and oriented to person, place, and time.  Cranial nerves II-XII grossly intact with no focal or lateralizing deficits.  Muscle tone normal.  Gait normal. No tremor.   MSK: ROM of upper and lower ext symmetric and full.  PSYCH: Mood is good.  Affect appropriate. Speech fluent. Answers questions appropriately and thought process normal.       ASSESSMENT AND PLAN:    Hypothyroidism, unspecified type  Stable at this time, continue medication  - levothyroxine (SYNTHROID/LEVOTHROID) 100 MCG tablet  Dispense: 90 tablet; Refill: 3  - TSH    Panic anxiety syndrome  Continue on clonazepam at this time although we discussed alternatives and may consider switching her to an SSRI in the long run at some point in the future.    Screening for diabetes mellitus  - Comprehensive Metabolic Panel    Mixed " hyperlipidemia  -Cholesterol is better than last year.  Overall ASCVD score is 3.8% which does not justify medication.  We will continue to monitor.  - Lipid Panel    High risk medication use  - CBC W PLT No Diff    Right elbow pain  - Ice, elevation, gentle movement/rest as tolerated  - Ibuprofen, Naproxen or other NSAIDs plus Tylenol as needed  - offered PT ,patient is not interested at this time but will call if interested in the future  - patient is to call if she has additional problems with this or if new symptoms develop  - celecoxib (CELEBREX) 50 MG capsule  Dispense: 60 capsule; Refill: 1    Ingrown toenail  Information provided on this.  Instructions provided on how to cut her toenails.  She is to call if she has continued issues.    PND (post-nasal drip)  - conservative measures with OTC antihistamine as in patient instructions  - Return/call as needed for follow-up should any new symptoms develop, for worsening of current symptoms or if symptoms do not resolve with above plan.           Return in about 1 year (around 11/18/2020) for Annual Review.      RACHEL LEUNG DO   11/18/2019 10:23 AM    This document was prepared using voice generated softwear. While every attempt was made for accuracy, spelling and grammatical errors may exist.

## 2019-11-18 NOTE — NURSING NOTE
Patient presents to the clinic for annual visit, medication discussion and review.     Medication Reconciliation: complete   Liv Gupta LPN............. November 18, 2019 9:55 AM

## 2019-11-18 NOTE — PATIENT INSTRUCTIONS
For your seasonal allergies, please try one of the following:  - Loratadine (Claritin) 10mg daily for 3-4 weeks and then as needed (least sedating, least effective)  - Fexofenadine (Allegra) 180mg daily for 3-4 weeks and then as needed   - Cetirizine (Zyrtec) 10mg daily for 3-4 weeks and then as needed (most sedating,most effective)    Please note that these can take up to 3-4 weeks to see a difference.  ----------------------------------------------------------------------------------    - can trial the following medications in place of Ranitidine:    Famotidine (Pepcid) 10-20mg twice daily 20-30 minutes before eating          Patient Education     Treating Panic Disorder (Panic Attack) with Therapy  If you have frequent panic attacks, you don t have to suffer anymore. Treatment is available. Therapy (also called counseling) is often a helpful treatment for panic disorder. With therapy, a specially trained professional (therapist) helps you face and learn to manage your anxiety. Therapy can be short-term or long-term depending on your needs. In some cases, medicine may also be prescribed along with therapy. It may take time before you notice how much therapy is helping, but stick with it. With therapy, you can feel better.    Cognitive behavioral therapy (CBT)  Cognitive behavioral therapy (CBT) teaches you to manage anxiety that can lead to panic attacks. It does this by helping you understand how you think and act when you re anxious. Research has shown CBT to be a very effective treatment for panic disorder. How CBT is run is almost like a class. It involves homework and activities to build skills that teach you to cope with anxiety step by step. It can be done in a group or one-on-one, and often takes place for a set number of sessions. CBT has two main parts:    Cognitive therapy helps you identify the negative, irrational thoughts that occur with your anxiety. You ll learn to replace these with more positive,  realistic thoughts.    Behavioral therapy helps you change how you react to anxiety. You ll learn coping skills and methods for relaxing to help you better deal with anxiety.  Other forms of therapy  Other therapy methods may work better for you than CBT. Or, you may move from CBT to another form of therapy as your treatment needs change. This may mean meeting with a therapist by yourself or in a group. Therapy can also help you work through problems in your life, such as drug or alcohol dependence, that may be making your anxiety worse.  Warning signs for suicide  Panic disorders can be a discouraging, frightening condition that can lead some people to consider self-harm or suicide. It is very important to work with a trusted therapist, take any medicines as prescribed, and seek help if you have any of these symptoms:    Thinking often about taking your life    Planning how you may attempt it.    Talking or writing about committing suicide    Feeling that death is the only solution to your problems    Feeling a pressing need to make out your will or arrange your     Giving away things you own    Participating in risky behaviors, such as sex with someone you don't know or drinking and driving  If you notice any of these warning signs, get help right away. You can call a mental health clinic, a 24-hour suicide crisis hotline, or go to a hospital emergency room.  Other resources  National Suicide Prevention Lifeline  403.241.4090 (481-736-PNXE)  www.suicidepreventionlifeline.org  National Suicide Hotline  232.915.7595 (004-SUICIDE)  National [place] [employer]ness (JES)  334.139.7192  www.jes.org  Mental Health Meme  114.114.8368  www.[facility].org  Getting better takes time  Therapy will help you feel better and teach you skills to help manage your panic disorder long term. But change doesn t happen right away. It takes a commitment from you. And treatment only works if you learn to face the causes of your  anxiety. So, you might feel worse before you feel better. This can sometimes make it hard to stick with it. But remember: Therapy is a very effective treatment. The results will be well worth it.  Date Last Reviewed: 1/1/2017 2000-2018 [employer]. [address] [place] [address]. All rights reserved. This information is not intended as a substitute for professional medical care. Always follow your healthcare professional's instructions.     Patient Education     Ingrown Toenail, Not Infected (Home Treatment)  An ingrown toenail occurs when the nail grows sideways into the skin next to the nail. This can cause pain, especially when wearing tight shoes. It can also lead to an infection with redness, swelling, and pus drainage. Most people respond to the treatments described here. But sometimes surgery is needed. The big toe is most often affected.   The most common cause of an ingrown toenail is trimming your nails wrong. Most people trim the nails too close to the skin and try to round the nail too tightly around the shape of the toe. When you do this, the nail can grow into the skin of your toe. It is safer to trim the nail ending in a straight line rather than a curve.  Home care  The following guidelines will help you care for your toenail at home:    Soak the painful toe in warm water 3 to 4 times each day, for 10 to 20 minutes each time. Adding Epsom salt may be recommended by your healthcare provider. Wash the entire foot with an antibacterial soap. Then keep it dry.    If there is redness or swelling around the toenail, apply an antibiotic ointment 3 times a day.    Insert a small piece of rolled-up cotton under the corner of the nail. This helps the nail to grow outward, away from the cuticle.    Wear shoes that don t put pressure on the toes, such as a sandal or open shoe. Closed shoes should be big enough in the toes so that there is no pressure on the painful toe.    You may use  acetaminophen or ibuprofen for pain, unless another pain medicine was prescribed. Talk with your healthcare provider before using these medicines if you have chronic liver or kidney disease. Also tell your provider if you have ever had a stomach ulcer or GI (gastrointestinal) bleeding.  Prevention  The following tips will help you prevent ingrown toenails:    Avoid pointed, tight, or narrow shoes.    Trim toenails once a month so they don t grow too long. Cut the nail straight across.  Follow-up care  Follow up with your healthcare provider, or as advised.  When to seek medical advice  Call your healthcare provider right away if any of these occur:    Increasing redness, pain, or swelling of the toe    Tender red streaks in the skin leading toward the ankle    Pus or fluid drainage from the toe    Fever of 100.4 F (38 C) or higher, or as directed by your provider  Date Last Reviewed: 4/1/2017 2000-2018 [employer]. [address] [place] [address], [place] [address]. All rights reserved. This information is not intended as a substitute for professional medical care. Always follow your healthcare professional's instructions.

## 2019-11-20 NOTE — TELEPHONE ENCOUNTER
Contacted the patient and let her know lab orders have been placed. She stated that her glucose was 108 and is wondering if this could be checked again at that time as well  Corrina Mora LPN on 11/20/2019 at 9:21 AM

## 2019-12-30 ENCOUNTER — MYC MEDICAL ADVICE (OUTPATIENT)
Dept: INTERNAL MEDICINE | Facility: OTHER | Age: 64
End: 2019-12-30

## 2019-12-30 DIAGNOSIS — R73.09 ELEVATED GLUCOSE: ICD-10-CM

## 2019-12-30 DIAGNOSIS — D72.819 LEUKOPENIA, UNSPECIFIED TYPE: ICD-10-CM

## 2019-12-30 DIAGNOSIS — D72.829 LEUKOCYTOSIS, UNSPECIFIED TYPE: Primary | ICD-10-CM

## 2019-12-30 LAB
BASOPHILS # BLD AUTO: 0 10E9/L (ref 0–0.2)
BASOPHILS NFR BLD AUTO: 0.1 %
DIFFERENTIAL METHOD BLD: ABNORMAL
EOSINOPHIL # BLD AUTO: 0 10E9/L (ref 0–0.7)
EOSINOPHIL NFR BLD AUTO: 0 %
ERYTHROCYTE [DISTWIDTH] IN BLOOD BY AUTOMATED COUNT: 12.7 % (ref 10–15)
GLUCOSE SERPL-MCNC: 99 MG/DL (ref 70–105)
HCT VFR BLD AUTO: 41.9 % (ref 35–47)
HGB BLD-MCNC: 14 G/DL (ref 11.7–15.7)
IMM GRANULOCYTES # BLD: 0.2 10E9/L (ref 0–0.4)
IMM GRANULOCYTES NFR BLD: 1.8 %
LYMPHOCYTES # BLD AUTO: 2 10E9/L (ref 0.8–5.3)
LYMPHOCYTES NFR BLD AUTO: 17.9 %
MCH RBC QN AUTO: 31.6 PG (ref 26.5–33)
MCHC RBC AUTO-ENTMCNC: 33.4 G/DL (ref 31.5–36.5)
MCV RBC AUTO: 95 FL (ref 78–100)
MONOCYTES # BLD AUTO: 0.7 10E9/L (ref 0–1.3)
MONOCYTES NFR BLD AUTO: 5.9 %
NEUTROPHILS # BLD AUTO: 8.5 10E9/L (ref 1.6–8.3)
NEUTROPHILS NFR BLD AUTO: 74.3 %
PLATELET # BLD AUTO: 378 10E9/L (ref 150–450)
RBC # BLD AUTO: 4.43 10E12/L (ref 3.8–5.2)
WBC # BLD AUTO: 11.4 10E9/L (ref 4–11)

## 2019-12-30 PROCEDURE — 82947 ASSAY GLUCOSE BLOOD QUANT: CPT | Mod: ZL | Performed by: INTERNAL MEDICINE

## 2019-12-30 PROCEDURE — 36415 COLL VENOUS BLD VENIPUNCTURE: CPT | Mod: ZL | Performed by: INTERNAL MEDICINE

## 2019-12-30 PROCEDURE — 85025 COMPLETE CBC W/AUTO DIFF WBC: CPT | Mod: ZL | Performed by: INTERNAL MEDICINE

## 2020-01-31 ENCOUNTER — MYC MEDICAL ADVICE (OUTPATIENT)
Dept: INTERNAL MEDICINE | Facility: OTHER | Age: 65
End: 2020-01-31

## 2020-01-31 DIAGNOSIS — D72.829 LEUKOCYTOSIS, UNSPECIFIED TYPE: ICD-10-CM

## 2020-01-31 LAB
BASOPHILS # BLD AUTO: 0 10E9/L (ref 0–0.2)
BASOPHILS NFR BLD AUTO: 0.8 %
DIFFERENTIAL METHOD BLD: NORMAL
EOSINOPHIL # BLD AUTO: 0.1 10E9/L (ref 0–0.7)
EOSINOPHIL NFR BLD AUTO: 1 %
ERYTHROCYTE [DISTWIDTH] IN BLOOD BY AUTOMATED COUNT: 12.7 % (ref 10–15)
HCT VFR BLD AUTO: 43.8 % (ref 35–47)
HGB BLD-MCNC: 14.5 G/DL (ref 11.7–15.7)
IMM GRANULOCYTES # BLD: 0 10E9/L (ref 0–0.4)
IMM GRANULOCYTES NFR BLD: 0 %
LYMPHOCYTES # BLD AUTO: 2.1 10E9/L (ref 0.8–5.3)
LYMPHOCYTES NFR BLD AUTO: 43.1 %
MCH RBC QN AUTO: 31.3 PG (ref 26.5–33)
MCHC RBC AUTO-ENTMCNC: 33.1 G/DL (ref 31.5–36.5)
MCV RBC AUTO: 95 FL (ref 78–100)
MONOCYTES # BLD AUTO: 0.4 10E9/L (ref 0–1.3)
MONOCYTES NFR BLD AUTO: 7.6 %
NEUTROPHILS # BLD AUTO: 2.4 10E9/L (ref 1.6–8.3)
NEUTROPHILS NFR BLD AUTO: 47.5 %
PLATELET # BLD AUTO: 305 10E9/L (ref 150–450)
RBC # BLD AUTO: 4.63 10E12/L (ref 3.8–5.2)
RETICS # AUTO: 64.8 10E9/L (ref 25–95)
RETICS/RBC NFR AUTO: 1.4 % (ref 0.5–2)
WBC # BLD AUTO: 5 10E9/L (ref 4–11)

## 2020-01-31 PROCEDURE — 36415 COLL VENOUS BLD VENIPUNCTURE: CPT | Mod: ZL | Performed by: INTERNAL MEDICINE

## 2020-01-31 PROCEDURE — 85025 COMPLETE CBC W/AUTO DIFF WBC: CPT | Mod: ZL | Performed by: INTERNAL MEDICINE

## 2020-01-31 PROCEDURE — 85045 AUTOMATED RETICULOCYTE COUNT: CPT | Mod: ZL | Performed by: INTERNAL MEDICINE

## 2020-06-05 ENCOUNTER — OFFICE VISIT (OUTPATIENT)
Dept: INTERNAL MEDICINE | Facility: OTHER | Age: 65
End: 2020-06-05
Attending: NURSE PRACTITIONER
Payer: COMMERCIAL

## 2020-06-05 ENCOUNTER — HOSPITAL ENCOUNTER (OUTPATIENT)
Dept: GENERAL RADIOLOGY | Facility: OTHER | Age: 65
End: 2020-06-05
Attending: NURSE PRACTITIONER
Payer: COMMERCIAL

## 2020-06-05 VITALS
WEIGHT: 146.8 LBS | HEART RATE: 78 BPM | DIASTOLIC BLOOD PRESSURE: 80 MMHG | OXYGEN SATURATION: 99 % | RESPIRATION RATE: 16 BRPM | BODY MASS INDEX: 24.46 KG/M2 | SYSTOLIC BLOOD PRESSURE: 130 MMHG | TEMPERATURE: 98.1 F | HEIGHT: 65 IN

## 2020-06-05 DIAGNOSIS — M54.41 ACUTE RIGHT-SIDED LOW BACK PAIN WITH RIGHT-SIDED SCIATICA: Primary | ICD-10-CM

## 2020-06-05 DIAGNOSIS — M54.41 ACUTE RIGHT-SIDED LOW BACK PAIN WITH RIGHT-SIDED SCIATICA: ICD-10-CM

## 2020-06-05 DIAGNOSIS — K13.0 LIP LESION: ICD-10-CM

## 2020-06-05 PROCEDURE — 99214 OFFICE O/P EST MOD 30 MIN: CPT | Performed by: NURSE PRACTITIONER

## 2020-06-05 PROCEDURE — 72100 X-RAY EXAM L-S SPINE 2/3 VWS: CPT

## 2020-06-05 RX ORDER — IBUPROFEN 800 MG/1
800 TABLET, FILM COATED ORAL EVERY 8 HOURS PRN
Qty: 60 TABLET | Refills: 0 | Status: SHIPPED | OUTPATIENT
Start: 2020-06-05 | End: 2020-08-07

## 2020-06-05 RX ORDER — METHOCARBAMOL 500 MG/1
500 TABLET, FILM COATED ORAL 4 TIMES DAILY PRN
Qty: 60 TABLET | Refills: 0 | Status: SHIPPED | OUTPATIENT
Start: 2020-06-05 | End: 2020-11-19

## 2020-06-05 ASSESSMENT — ENCOUNTER SYMPTOMS
BACK PAIN: 1
ACTIVITY CHANGE: 1
SLEEP DISTURBANCE: 1
MYALGIAS: 1

## 2020-06-05 ASSESSMENT — MIFFLIN-ST. JEOR: SCORE: 1218.82

## 2020-06-05 ASSESSMENT — PAIN SCALES - GENERAL: PAINLEVEL: MILD PAIN (3)

## 2020-06-05 NOTE — PROGRESS NOTES
"Nursing Notes:   Jorge Callowayie, LPN  6/5/2020 10:41 AM  Sign at exiting of workspace  Chief Complaint   Patient presents with     Back Pain   Patient presents to the clinic today for sciatic pain.Started in February. Pain is on right side and runs down her leg. Also has a blister on top lip she would like provider to look at. Started to change and get bigger,     Medication Reconciliation: completed   Elsy AMINA Calloway  6/5/2020 10:37 AM   Nursing note reviewed with patient.  Accuracy and completeness verified.        Subjective:   Ms. Hollins is a 64 year old female who presents to the clinic today with complaints of right sided sciatic pain. She fell down the stairs six years ago and has residual dent in her low back. That area is where her pain is. This pain is affecting her sleep. This has been going on since February and progressively worsening. Using massive amounts of naprosyn, flexeril, TENS unit, warming gels, ice, yoga stretching, sciatic stretches, and tennis ball. She reports that her lower right leg feels as though it is \"really tight\".      The pain is a result of an injury  The pain began in February, 2020.  The pain is located right sciatic pain and tightness in her lower right leg.  The pain is rated 3 on a scale of 1-10 in clinic, rates it 8 or 9 at night when she is trying to sleep. It wakes her up during the night.    Numbness and tingling is not reported.  Lower extremity weakness is not reported.   Bowel/Bladder dysfunction is not reported. Does question if her urgency is related.  What aggravates the pain? Sleep, lying flat  What relieves the pain? Movement    She also has concerns of a lesion on the right side of her upper lip. She has probably had this for a year but has noticed over the past four to five months, it has changed and is painful.    She  reports that she quit smoking about 15 years ago. Her smoking use included cigarettes. She has a 3.00 pack-year smoking history. She has never " "used smokeless tobacco.        The patient has a past medical history which is reviewed and listed as below:     Past Medical History:   Diagnosis Date     Adenomatous colon polyp 2008     Benign lipomatous neoplasm of skin and subcutaneous tissue of extremity 06/15/2017     Diverticulosis of intestine without perforation or abscess without bleeding      Ectopic pregnancy without intrauterine pregnancy 1990     Gastro-esophageal reflux disease without esophagitis      Hyperlipidemia      Hypothyroidism      Nonspecific reaction to tuberculin skin test without active tuberculosis     2001,Normal CXR; treated with INH, incomplete treatment but mostly finished     Other bursal cyst, left hip 01/05/2016     Other complicated headache syndrome     2008,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   Musculoskeletal: Positive for back pain and myalgias.   Skin:        Blister like lesion on her right upper lip   Psychiatric/Behavioral: Positive for sleep disturbance.   All other systems reviewed and are negative.       Objective:    /80 (BP Location: Right arm, Patient Position: Sitting, Cuff Size: Adult Regular)   Pulse 78   Temp 98.1  F (36.7  C) (Tympanic)   Resp 16   Ht 1.638 m (5' 4.5\")   Wt 66.6 kg (146 lb 12.8 oz)   SpO2 99%   BMI 24.81 kg/m      Physical Exam  Vitals signs reviewed.   Constitutional:       Appearance: Normal appearance.   HENT:      Head: Normocephalic and atraumatic.        Comments: Image in media     Nose: Nose normal.      Mouth/Throat:      Mouth: Mucous membranes are moist.      Pharynx: Oropharynx is clear.   Eyes:      Extraocular Movements: Extraocular movements intact.      Conjunctiva/sclera: Conjunctivae normal.      Pupils: Pupils are equal, round, and reactive to " light.   Neck:      Musculoskeletal: Normal range of motion and neck supple.   Cardiovascular:      Rate and Rhythm: Normal rate and regular rhythm.      Heart sounds: Normal heart sounds.   Pulmonary:      Effort: Pulmonary effort is normal.      Breath sounds: Normal breath sounds.   Abdominal:      General: Bowel sounds are normal.      Palpations: Abdomen is soft.   Skin:     General: Skin is warm.      Findings: Lesion and rash present. Rash is nodular.   Neurological:      Mental Status: She is alert and oriented to person, place, and time.   Psychiatric:         Mood and Affect: Mood normal.         Behavior: Behavior normal.         Thought Content: Thought content normal.         Judgment: Judgment normal.          Assessment/Plan:       -1. Acute right-sided low back pain with right-sided sciatica  - PHYSICAL THERAPY REFERRAL; Future  - XR Lumbar Spine 2/3 Views; showed the lumbar discs are normal in height vertebral bodies and arches are intact lower lumbar facet joint degenerative changes noted at L4-L5 and L5-S1 bilaterally.  - RADIOLOGY REFERRAL for injections.  - methocarbamol (ROBAXIN) 500 MG tablet; Take 1 tablet (500 mg) by mouth 4 times daily as needed for muscle spasms  Dispense: 60 tablet; Refill: 0  - ibuprofen (ADVIL/MOTRIN) 800 MG tablet; Take 1 tablet (800 mg) by mouth every 8 hours as needed for moderate pain  Dispense: 60 tablet; Refill: 0   Apply ice to painful area 3-4 times per day for 15 minutes each time, elevation, gentle movement/rest as tolerated.  - Patient is to call if she has additional problems with this or if new symptoms develop.  - Printed handout for stretching exercises given to patient.    2. Lip lesion  I am not comfortable removing this lesion and feels she needs further evaluation by dermatology.  - DERMATOLOGY REFERRAL sent to [facility] dermatology in [facility], phone number given      Return if symptoms worsen or fail to improve.     DAR KOO,  DARLINE  06/05/2020 2:10 PM

## 2020-06-05 NOTE — NURSING NOTE
Chief Complaint   Patient presents with     Back Pain   Patient presents to the clinic today for sciatic pain.Started in February. Pain is on right side and runs down her leg. Also has a blister on top lip she would like provider to look at. Started to change and get bigger,     Medication Reconciliation: completed   Elsy Calloway LPN  6/5/2020 10:37 AM

## 2020-06-15 ENCOUNTER — TRANSFERRED RECORDS (OUTPATIENT)
Dept: HEALTH INFORMATION MANAGEMENT | Facility: OTHER | Age: 65
End: 2020-06-15

## 2020-06-17 ENCOUNTER — HOSPITAL ENCOUNTER (OUTPATIENT)
Dept: PHYSICAL THERAPY | Facility: OTHER | Age: 65
Setting detail: THERAPIES SERIES
End: 2020-06-17
Attending: NURSE PRACTITIONER
Payer: COMMERCIAL

## 2020-06-17 DIAGNOSIS — M54.41 ACUTE RIGHT-SIDED LOW BACK PAIN WITH RIGHT-SIDED SCIATICA: ICD-10-CM

## 2020-06-17 PROCEDURE — 97161 PT EVAL LOW COMPLEX 20 MIN: CPT | Mod: GP

## 2020-06-17 PROCEDURE — 97110 THERAPEUTIC EXERCISES: CPT | Mod: GP

## 2020-06-19 ENCOUNTER — TELEPHONE (OUTPATIENT)
Dept: INTERNAL MEDICINE | Facility: OTHER | Age: 65
End: 2020-06-19

## 2020-06-19 NOTE — TELEPHONE ENCOUNTER
Please call Shirley and let her know that Erum from [facility] just called and her pre-authorization for MRI was denied.  She did not have official declination letter yet but wanted to give us a heads up. She said typically her insurance needs prior treatment that has failed - my note was specific but apparently not enough.     We will need to have this approved otherwise she will have to pay out of pocket for this MRI.    She may call her insurance also and see what exactly they want/need and let us know.    Thanks.  Cherelle

## 2020-06-19 NOTE — PROGRESS NOTES
06/17/20 1500   General Information   Type of Visit Initial OP Ortho PT Evaluation   Start of Care Date 06/17/20   Referring Physician Cherelle Dean NP   Patient/Family Goals Statement To eleiminate LBP   Orders Evaluate and Treat   Date of Order 06/05/20   Certification Required? No   Medical Diagnosis Acute right-sided low back pain with right-sided sciatica   Surgical/Medical history reviewed Yes   Precautions/Limitations no known precautions/limitations   Weight-Bearing Status - LUE full weight-bearing   General Information Comments None   Body Part(s)   Body Part(s) Lumbar Spine/SI   Presentation and Etiology   Pertinent history of current problem (include personal factors and/or comorbidities that impact the POC) Patient is a 65 y/o female whom presents to PT at this time with c/o acute right sided LBP with radicular symptoms affecting her right posterior leg. However, this pain is only when she is laying down at night trying to sleep. She denies any pain with walking. Shirley reports that the pain started when she fell down some stairs 6 years ago. Reports that she developed a significant bruise in nthe posterior right hip. She has been using a TENS unit NSAIDs and massage that has been minimally helpful.  Reports most of her pain is at night when she lays on the right side.  Reports she can walk w/o pain. She can sit for variable amounts of time with pain in the right leg.. Has good general health. Recent lesion removed from right lip. will have biopsy. She has bee completing yoga for many months, she does core strength training with planks ad is doing a good job isolating TA. She walks variable distances everyday always w/o pain.   Symptom Location Right posterior hip and right posterior to lateral hip and leg.    How/Where did it occur With a fall;At home;From insidious onset   Pain rating (0-10 point scale) Best (/10);Worst (/10)   Best (/10) 0/10   Worst (/10) At night variable 6-8/10   Pain quality  B. Dull;C. Aching   Frequency of pain/symptoms B. Intermittent   Pain/symptoms are: Worse during the night   Pain/symptoms exacerbated by A. Sitting;M. Other   Pain exacerbation comment Laying down at night   Current / Previous Interventions   Diagnostic Tests: X-ray   X-ray Results Results   X-ray results Normal disc height, minimal degenerative disk disease.   Prior Level of Function   Prior Level of Function-Mobility NML   Prior Level of Function-ADLs NML   Current Level of Function   Current Community Support Family/friend caregiver   Patient role/employment history F. Retired   Living environment House/townhome   Home/community accessibility NML   Fall Risk Screen   Fall screen completed by PT   Have you fallen 2 or more times in the past year? No   Have you fallen and had an injury in the past year? No   Is patient a fall risk? No   Abuse Screen (yes response referral indicated)   Feels Unsafe at Home or Work/School no   Feels Threatened by Someone no   Does Anyone Try to Keep You From Having Contact with Others or Doing Things Outside Your Home? no   Physical Signs of Abuse Present no   Lumbar Spine/SI Objective Findings   Observation No visible deformity   Integumentary NML   Posture NML slightly reduced lumbar lordosis   Gait/Locomotion NML, non antalgic   Balance/Proprioception (Single Leg Stance) GOOD B    Lumbar ROM Comment All completely nml and w/o pain   Pelvic Screen NEG standing fwd bend, NEG supine to sit, NEG SI joint provocation test   Hip Screen NML   Hip Flexion (L2) Strength 5   Hip Abduction Strength 4- due to pain over the right greater trochanter   Hip Adduction Strength 5   Hip Extension Strength 5   Knee Flexion Strength 5   Knee Extension (L3) Strength 5   Ankle Dorsiflexion (L4) Strength 5   Great Toe Extension (L5) Strength 5   Ankle Plantar Flexion (S1) Strength 5   Hamstring Flexibility GOOD B   Hip Flexor Flexibility NML B   Quadricep Flexibility NML B   SLR NEG   Crossover SLR NEG    Slump Test NEG   Lumbar/SI Special Tests Comments NEG   Sensation Testing NML   Neurological Testing Comments Intact   Palpation There is dense tissue located in the deep right upper buttock region. 2/4 tenderness of the right piriformis. Exquisite tenderness of the right posterior hip deep into the sciatic notch, tender right SI joint w/o mal alignment. Exquisite tenderness 3/4 of area over right greater trochanter and upper IT band.     Planned Therapy Interventions   Planned Therapy Interventions joint mobilization;manual therapy;strengthening;stretching   Planned Modality Interventions   Planned Modality Interventions Cryotherapy   Clinical Impression   Criteria for Skilled Therapeutic Interventions Met yes, treatment indicated   PT Diagnosis Right posterior hip pain/tenderness due to sciatic nerve compression   Influenced by the following impairments Pain    Functional limitations due to impairments Difficulty with sleep and sitting due to posterior hip pain.   Clinical Presentation Stable/Uncomplicated   Clinical Decision Making (Complexity) Low complexity   Therapy Frequency 2 times/Week   Predicted Duration of Therapy Intervention (days/wks) 8 weeks   Risk & Benefits of therapy have been explained Yes   Patient, Family & other staff in agreement with plan of care Yes   Education Assessment   Preferred Learning Style Listening;Reading;Demonstration;Pictures/video   Barriers to Learning No barriers   ORTHO GOALS   PT Ortho Eval Goals 1;2;3   Ortho Goal 1   Goal Identifier Pain   Goal Description Patient will report she is able to sleep comfortably through the night w/o limit due to right posterior and lateral hip pain in 6-8 weeks   Target Date 08/18/20   Ortho Goal 2   Goal Identifier Strength   Goal Description Patient will demo full grade 5/5 MMT strength of the right hip muscle group providinig support for the R LE and lumbar/sacral region and maintaining optimal alignment eliminating pain during sitting  and sleep positions in 6-8 weeks.    Target Date 08/18/20   Ortho Goal 3   Goal Identifier HEP   Goal Description Patient will be able to complete a comprehensive exercise program focused on strengthening of the right hip and lumbo-pelvic complex in 4-6 weeks   Target Date 08/05/20   Total Evaluation Time   PT Funmi, Low Complexity Minutes (39662) 35

## 2020-06-19 NOTE — TELEPHONE ENCOUNTER
Patient informed of below. She states she was suppose to have an injection in her back but she has not heard anything from [facility]. Writer spoke with [facility] and was informed they did not have an order for an injection. Writer was also informed that patient cannot have injection until MRI is done. Patient will need to call insurance to see why they will not cover MRI or she will need to pay out-of-pocket for MRI.     Left message to call back.     Iwona Malin CMA on 6/19/2020 at 2:17 PM

## 2020-06-23 ENCOUNTER — HOSPITAL ENCOUNTER (OUTPATIENT)
Dept: PHYSICAL THERAPY | Facility: OTHER | Age: 65
Setting detail: THERAPIES SERIES
End: 2020-06-23
Attending: NURSE PRACTITIONER
Payer: COMMERCIAL

## 2020-06-23 PROCEDURE — 97035 APP MDLTY 1+ULTRASOUND EA 15: CPT | Mod: GP

## 2020-06-23 PROCEDURE — 97140 MANUAL THERAPY 1/> REGIONS: CPT | Mod: GP

## 2020-06-23 PROCEDURE — 97110 THERAPEUTIC EXERCISES: CPT | Mod: GP

## 2020-06-30 ENCOUNTER — HOSPITAL ENCOUNTER (OUTPATIENT)
Dept: PHYSICAL THERAPY | Facility: OTHER | Age: 65
Setting detail: THERAPIES SERIES
End: 2020-06-30
Attending: NURSE PRACTITIONER
Payer: COMMERCIAL

## 2020-06-30 PROCEDURE — 97035 APP MDLTY 1+ULTRASOUND EA 15: CPT | Mod: GP

## 2020-06-30 PROCEDURE — 97140 MANUAL THERAPY 1/> REGIONS: CPT | Mod: GP

## 2020-07-08 ENCOUNTER — HOSPITAL ENCOUNTER (OUTPATIENT)
Dept: PHYSICAL THERAPY | Facility: OTHER | Age: 65
Setting detail: THERAPIES SERIES
End: 2020-07-08
Attending: NURSE PRACTITIONER
Payer: COMMERCIAL

## 2020-07-08 PROCEDURE — 97035 APP MDLTY 1+ULTRASOUND EA 15: CPT | Mod: GP

## 2020-07-08 PROCEDURE — 97140 MANUAL THERAPY 1/> REGIONS: CPT | Mod: GP

## 2020-07-10 ENCOUNTER — HOSPITAL ENCOUNTER (OUTPATIENT)
Dept: PHYSICAL THERAPY | Facility: OTHER | Age: 65
Setting detail: THERAPIES SERIES
End: 2020-07-10
Attending: NURSE PRACTITIONER
Payer: COMMERCIAL

## 2020-07-10 PROCEDURE — 97140 MANUAL THERAPY 1/> REGIONS: CPT | Mod: GP

## 2020-07-13 ENCOUNTER — HOSPITAL ENCOUNTER (OUTPATIENT)
Dept: PHYSICAL THERAPY | Facility: OTHER | Age: 65
Setting detail: THERAPIES SERIES
End: 2020-07-13
Attending: NURSE PRACTITIONER
Payer: COMMERCIAL

## 2020-07-13 PROCEDURE — 97035 APP MDLTY 1+ULTRASOUND EA 15: CPT | Mod: GP

## 2020-07-13 PROCEDURE — 97140 MANUAL THERAPY 1/> REGIONS: CPT | Mod: GP

## 2020-07-16 ENCOUNTER — HOSPITAL ENCOUNTER (OUTPATIENT)
Dept: PHYSICAL THERAPY | Facility: OTHER | Age: 65
Setting detail: THERAPIES SERIES
End: 2020-07-16
Attending: NURSE PRACTITIONER
Payer: COMMERCIAL

## 2020-07-16 PROCEDURE — 97035 APP MDLTY 1+ULTRASOUND EA 15: CPT | Mod: GP

## 2020-07-16 PROCEDURE — 97140 MANUAL THERAPY 1/> REGIONS: CPT | Mod: GP

## 2020-07-20 ENCOUNTER — TRANSFERRED RECORDS (OUTPATIENT)
Dept: HEALTH INFORMATION MANAGEMENT | Facility: OTHER | Age: 65
End: 2020-07-20

## 2020-07-31 ENCOUNTER — HOSPITAL ENCOUNTER (OUTPATIENT)
Dept: PHYSICAL THERAPY | Facility: OTHER | Age: 65
Setting detail: THERAPIES SERIES
End: 2020-07-31
Attending: NURSE PRACTITIONER
Payer: COMMERCIAL

## 2020-07-31 PROCEDURE — 97140 MANUAL THERAPY 1/> REGIONS: CPT | Mod: GP

## 2020-07-31 PROCEDURE — 97035 APP MDLTY 1+ULTRASOUND EA 15: CPT | Mod: GP

## 2020-08-12 ENCOUNTER — HOSPITAL ENCOUNTER (OUTPATIENT)
Dept: PHYSICAL THERAPY | Facility: OTHER | Age: 65
Setting detail: THERAPIES SERIES
End: 2020-08-12
Attending: NURSE PRACTITIONER
Payer: COMMERCIAL

## 2020-08-12 PROCEDURE — 97035 APP MDLTY 1+ULTRASOUND EA 15: CPT | Mod: GP

## 2020-08-12 PROCEDURE — 97140 MANUAL THERAPY 1/> REGIONS: CPT | Mod: GP

## 2020-08-14 ENCOUNTER — HOSPITAL ENCOUNTER (OUTPATIENT)
Dept: PHYSICAL THERAPY | Facility: OTHER | Age: 65
Setting detail: THERAPIES SERIES
End: 2020-08-14
Attending: NURSE PRACTITIONER
Payer: COMMERCIAL

## 2020-08-14 PROCEDURE — 97140 MANUAL THERAPY 1/> REGIONS: CPT | Mod: GP

## 2020-08-14 PROCEDURE — 97035 APP MDLTY 1+ULTRASOUND EA 15: CPT | Mod: GP

## 2020-08-17 NOTE — PROGRESS NOTES
Outpatient Physical Therapy Progress Note     Patient: Shirley Hollins  : 1955    Beginning/End Dates of Reporting Period:  8/15/2020    Referring Provider: Cherelle Byrne NP    Therapy Diagnosis: Right posterior hip pain     Client Self Report: She was able to sleep well for two nights after the last treatment. However, She then re-develops right posterior hip pain that can radiate to her right lateral thigh but only when laying supine. She really has no pain with walking or most nml daily activities. She is continuing with her HEP and is doing many yoga stretches as well. She tries to walk as much as possible.    Objective Measurements:  Objective Measure: Palpation  Details: persistent soft tissue tenderness of right posterior hip. Continue to note a deformity in the posterior hip that appears to be a type of indentation along the lateral margin of the sacrum. Tenderness 2/4 of the right lateral hip and along the course of the IT band as well.  Objective Measure: Strength  Details: Good to NML LE strength.             Outcome Measures (most recent score):      Goals:  Goal Identifier Pain   Goal Description Patient will report she is able to sleep comfortably through the night w/o limit due to right posterior and lateral hip pain in 6-8 weeks   Target Date 20   Date Met      Progress:Continue goal. Patient continues to develop right posterior hip pain after 2-3 days of benefit from treatment. When this happens she is unable to sleep.     Goal Identifier Strength   Goal Description Patient will demo full grade 5/5 MMT strength of the right hip muscle group providinig support for the R LE and lumbar/sacral region and maintaining optimal alignment eliminating pain during sitting and sleep positions in 6-8 weeks.    Target Date 20   Date Met      Progress:Continue goal. Focus on core strength. LE strength is 5/5     Goal Identifier HEP   Goal Description Patient will be able to  complete a comprehensive exercise program focused on strengthening of the right hip and lumbo-pelvic complex in 4-6 weeks   Target Date 08/05/20   Date Met      Progress:Patient is doing well with her HEP         Progress Toward Goals:   Progress this reporting period: See above          Plan:  Continue therapy per current plan of care.    Discharge:  No

## 2020-08-19 ENCOUNTER — HOSPITAL ENCOUNTER (OUTPATIENT)
Dept: PHYSICAL THERAPY | Facility: OTHER | Age: 65
Setting detail: THERAPIES SERIES
End: 2020-08-19
Attending: NURSE PRACTITIONER
Payer: COMMERCIAL

## 2020-08-19 PROCEDURE — 97140 MANUAL THERAPY 1/> REGIONS: CPT | Mod: GP

## 2020-08-19 PROCEDURE — 97035 APP MDLTY 1+ULTRASOUND EA 15: CPT | Mod: GP

## 2020-08-21 ENCOUNTER — HOSPITAL ENCOUNTER (OUTPATIENT)
Dept: PHYSICAL THERAPY | Facility: OTHER | Age: 65
Setting detail: THERAPIES SERIES
End: 2020-08-21
Attending: NURSE PRACTITIONER
Payer: COMMERCIAL

## 2020-08-21 PROCEDURE — 97035 APP MDLTY 1+ULTRASOUND EA 15: CPT | Mod: GP

## 2020-08-21 PROCEDURE — 97140 MANUAL THERAPY 1/> REGIONS: CPT | Mod: GP

## 2020-08-24 ENCOUNTER — HOSPITAL ENCOUNTER (OUTPATIENT)
Dept: PHYSICAL THERAPY | Facility: OTHER | Age: 65
Setting detail: THERAPIES SERIES
End: 2020-08-24
Attending: NURSE PRACTITIONER
Payer: COMMERCIAL

## 2020-08-24 PROCEDURE — 97140 MANUAL THERAPY 1/> REGIONS: CPT | Mod: GP

## 2020-08-24 PROCEDURE — 97035 APP MDLTY 1+ULTRASOUND EA 15: CPT | Mod: GP

## 2020-08-26 ENCOUNTER — HOSPITAL ENCOUNTER (OUTPATIENT)
Dept: PHYSICAL THERAPY | Facility: OTHER | Age: 65
Setting detail: THERAPIES SERIES
End: 2020-08-26
Attending: NURSE PRACTITIONER
Payer: COMMERCIAL

## 2020-08-26 PROCEDURE — 97035 APP MDLTY 1+ULTRASOUND EA 15: CPT | Mod: GP

## 2020-08-26 PROCEDURE — 97140 MANUAL THERAPY 1/> REGIONS: CPT | Mod: GP

## 2020-08-27 DIAGNOSIS — M54.41 ACUTE RIGHT-SIDED LOW BACK PAIN WITH RIGHT-SIDED SCIATICA: Primary | ICD-10-CM

## 2020-09-02 ENCOUNTER — HOSPITAL ENCOUNTER (OUTPATIENT)
Dept: GENERAL RADIOLOGY | Facility: OTHER | Age: 65
End: 2020-09-02
Attending: INTERNAL MEDICINE
Payer: COMMERCIAL

## 2020-09-02 DIAGNOSIS — M54.41 ACUTE RIGHT-SIDED LOW BACK PAIN WITH RIGHT-SIDED SCIATICA: ICD-10-CM

## 2020-09-02 PROCEDURE — 72202 X-RAY EXAM SI JOINTS 3/> VWS: CPT

## 2020-09-02 PROCEDURE — 72170 X-RAY EXAM OF PELVIS: CPT

## 2020-10-06 ENCOUNTER — HOSPITAL ENCOUNTER (OUTPATIENT)
Dept: MRI IMAGING | Facility: OTHER | Age: 65
Discharge: HOME | End: 2020-10-06
Attending: NURSE PRACTITIONER | Admitting: NURSE PRACTITIONER
Payer: COMMERCIAL

## 2020-10-06 DIAGNOSIS — M54.41 ACUTE BACK PAIN WITH SCIATICA, RIGHT: ICD-10-CM

## 2020-10-06 PROCEDURE — 72148 MRI LUMBAR SPINE W/O DYE: CPT

## 2020-10-08 ENCOUNTER — MYC MEDICAL ADVICE (OUTPATIENT)
Dept: INTERNAL MEDICINE | Facility: OTHER | Age: 65
End: 2020-10-08

## 2020-10-12 ENCOUNTER — MYC MEDICAL ADVICE (OUTPATIENT)
Dept: INTERNAL MEDICINE | Facility: OTHER | Age: 65
End: 2020-10-12

## 2020-10-12 DIAGNOSIS — M54.50 ACUTE RIGHT-SIDED LOW BACK PAIN, UNSPECIFIED WHETHER SCIATICA PRESENT: Primary | ICD-10-CM

## 2020-10-16 ENCOUNTER — TELEPHONE (OUTPATIENT)
Dept: INTERNAL MEDICINE | Facility: OTHER | Age: 65
End: 2020-10-16

## 2020-10-16 ENCOUNTER — OFFICE VISIT (OUTPATIENT)
Dept: INTERNAL MEDICINE | Facility: OTHER | Age: 65
End: 2020-10-16
Attending: NURSE PRACTITIONER
Payer: COMMERCIAL

## 2020-10-16 VITALS
TEMPERATURE: 97 F | HEART RATE: 75 BPM | BODY MASS INDEX: 25.49 KG/M2 | SYSTOLIC BLOOD PRESSURE: 120 MMHG | RESPIRATION RATE: 16 BRPM | DIASTOLIC BLOOD PRESSURE: 60 MMHG | OXYGEN SATURATION: 99 % | WEIGHT: 150.8 LBS

## 2020-10-16 DIAGNOSIS — M54.41 BILATERAL LOW BACK PAIN WITH RIGHT-SIDED SCIATICA, UNSPECIFIED CHRONICITY: ICD-10-CM

## 2020-10-16 DIAGNOSIS — R29.898 WEAKNESS OF RIGHT LEG: ICD-10-CM

## 2020-10-16 DIAGNOSIS — M53.3 SI (SACROILIAC) JOINT DYSFUNCTION: Primary | ICD-10-CM

## 2020-10-16 PROCEDURE — 99213 OFFICE O/P EST LOW 20 MIN: CPT | Performed by: NURSE PRACTITIONER

## 2020-10-16 ASSESSMENT — ENCOUNTER SYMPTOMS
MYALGIAS: 1
SLEEP DISTURBANCE: 1
BACK PAIN: 1
ACTIVITY CHANGE: 1
ARTHRALGIAS: 1

## 2020-10-16 ASSESSMENT — PAIN SCALES - GENERAL: PAINLEVEL: MODERATE PAIN (5)

## 2020-10-16 NOTE — PROGRESS NOTES
"Nursing Notes:   Elsy Calloway LPN  10/16/2020  3:07 PM  Sign at exiting of workspace  Chief Complaint   Patient presents with     RECHECK     mri      Patient presents to the  clinic today for a follow up on MRI   Medication Reconciliation: completed   Elsy MesfindayannaAMINA  10/16/2020 3:06 PM     Nursing note reviewed with patient.  Accuracy and completeness verified.        Subjective:   Ms. Hollins is a 64 year old female who presents to the clinic today for follow-up on low back pain and right-sided SI joint pain and sciatic pain down her right leg.      She was initially seen in June of this year reporting she fell down the stairs six years ago and has a residual dent in her low back which is where she reports her pain is. This pain continues to affect her sleep due to inability to find a comfortable position that does not awaken her. This low back pain and sciatic pain on the right has been going on since February and progressively worsening.  She had reported she was using large amounts of naprosyn, flexeril, TENS unit, warming gels, ice, yoga stretching, sciatic stretches, and tennis ball. She reports that her lower right leg feels as though it is \"really tight\".  She has participated in physical therapy since June.    10/6/2020 MRI FINDINGS: Marrow signal intensity is within normal limits except for vertebral body hemangioma at the L5 level. There is no acute compression fracture. L5-S1 level is within normal limits. At the L4-5 level there is minimal dehydration of the disc. There is mild facet degenerative change. Similar changes are seen at L3-4 and L2-3. At the L1-2 level there is minimal bulging of the disc.  Neural foramina are patent throughout the lumbar spine.   IMPRESSION: Minimal degenerative disc disease. No central stenosis or foraminal narrowing.    XR Lumbar Spine 2/3 Views; showed the lumbar discs are normal in height vertebral bodies and arches are intact lower lumbar facet joint degenerative " changes noted at L4-L5 and L5-S1 bilaterally.    Insurance is today requiring additional documentation.      She  reports that she quit smoking about 15 years ago. Her smoking use included cigarettes. She has a 3.00 pack-year smoking history. She has never used smokeless tobacco.        The patient has a past medical history which is reviewed and listed as below:     Past Medical History:   Diagnosis Date     Adenomatous colon polyp 2008     Basal cell carcinoma 06/2020    lip     Benign lipomatous neoplasm of skin and subcutaneous tissue of extremity 06/15/2017     Diverticulosis of intestine without perforation or abscess without bleeding      Ectopic pregnancy without intrauterine pregnancy 1990     Gastro-esophageal reflux disease without esophagitis      Hyperlipidemia      Hypothyroidism      Nonspecific reaction to tuberculin skin test without active tuberculosis     2001,Normal CXR; treated with INH, incomplete treatment but mostly finished     Other bursal cyst, left hip 01/05/2016     Other complicated headache syndrome     2008,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   Musculoskeletal: Positive for arthralgias (Right SI joint/hip), back pain (Low back, mainly right-sided in the SI joint area and down her right leg), gait problem (Painful on the right due to SI joint pain) and myalgias (Right thigh right hip muscles).        Limited and painful range of motion   Psychiatric/Behavioral: Positive for sleep disturbance (Due to discomfort in right SI joint).   All other systems reviewed and are negative.       Objective:    /60 (BP Location: Right arm, Patient Position: Sitting, Cuff Size: Adult Regular)   Pulse 75   Temp 97  F (36.1  C) (Tympanic)   Resp 16   Wt 68.4 kg (150 lb 12.8 oz)    SpO2 99%   BMI 25.49 kg/m      Physical Exam  Vitals signs and nursing note reviewed.   Constitutional:       Appearance: Normal appearance.   HENT:      Head: Normocephalic and atraumatic.      Nose: Nose normal.      Mouth/Throat:      Mouth: Mucous membranes are moist.      Pharynx: Oropharynx is clear.   Eyes:      Extraocular Movements: Extraocular movements intact.      Conjunctiva/sclera: Conjunctivae normal.      Pupils: Pupils are equal, round, and reactive to light.   Neck:      Musculoskeletal: Normal range of motion and neck supple.   Cardiovascular:      Rate and Rhythm: Normal rate and regular rhythm.      Heart sounds: Normal heart sounds.   Pulmonary:      Effort: Pulmonary effort is normal.      Breath sounds: Normal breath sounds.   Abdominal:      General: Bowel sounds are normal.      Palpations: Abdomen is soft.   Musculoskeletal:      Comments: ABNORMAL PHYSICAL EXAM.    Positive distraction test, positive thigh thrust test, positive compression test, positive Dyana test with pain replicated and increased in the right SI joint and hip.    Patient is able to complete a heel walk, unable to complete a toe raise on the right leg.  Patient has increased pain in the right SI joint/low back with standing flexion and extension.      Patient has increased pain with hip flexion and internal and external rotation on the right.   Skin:     General: Skin is warm and dry.   Neurological:      Mental Status: She is alert and oriented to person, place, and time.   Psychiatric:         Mood and Affect: Mood normal.         Behavior: Behavior normal.         Thought Content: Thought content normal.         Judgment: Judgment normal.          Assessment/Plan:     SI (sacroiliac) joint dysfunction (right)  Patient is awaiting insurance approval for her right-sided SI joint injection.  She will continue to do her stretching and strengthening exercises as provided to her prior by physical therapy.  She will  continue to use over-the-counter products and Robaxin to manage her discomfort.  She may also use ice or heat to the painful areas.    Bilateral low back pain with right-sided sciatica, unspecified chronicity  Continue stretching and strengthening exercises as provided by physical therapy.  Continue to use over-the-counter products and Robaxin to manage discomfort.  Continue to use ice or heat to painful areas. Will await insurance decision regarding SI joint injection for pain relief    Weakness of right leg  Continue to work on exercises provided by physical therapy    Return if symptoms worsen or fail to improve.     DAR KOO, AGNP-C  10/16/2020 3:35 PM

## 2020-10-16 NOTE — NURSING NOTE
Chief Complaint   Patient presents with     RECHECK     mri      Patient presents to the  clinic today for a follow up on MRI   Medication Reconciliation: completed   Elsy Calloway LPN  10/16/2020 3:06 PM

## 2020-10-16 NOTE — TELEPHONE ENCOUNTER
"Insurance wants more information before they will approve SI joint injection, even thought [facility] sent the PT notes, MRI and all x-rays.    Since there isn't an actual recent visit with a provider doing a physical exam they are saying \"insufficient clinical information was provided\" they need a note saying \"abnormal physical exam\" in a clinic note.   They are requiring resolution by Lionel 10/18/20 or it will be denied.    Appointment given for 3:00 today in order to get the physical exam of her back and legs done.  Nolvia Lambert CMA([facility])..................10/16/2020   9:18 AM   "

## 2020-10-16 NOTE — TELEPHONE ENCOUNTER
After speaking to Miladys in [facility] this writer dropped off the papers to the  at [facility] per [facility] request to submit to Insurance,   Elsy Calloway LPN on 10/16/2020 at 4:13 PM

## 2020-10-23 ENCOUNTER — HOSPITAL ENCOUNTER (OUTPATIENT)
Dept: GENERAL RADIOLOGY | Facility: OTHER | Age: 65
Discharge: HOME | End: 2020-10-23
Attending: INTERNAL MEDICINE | Admitting: INTERNAL MEDICINE
Payer: COMMERCIAL

## 2020-10-23 DIAGNOSIS — M54.50 ACUTE RIGHT-SIDED LOW BACK PAIN, UNSPECIFIED WHETHER SCIATICA PRESENT: ICD-10-CM

## 2020-10-23 PROCEDURE — 255N000002 HC RX 255 OP 636: Performed by: RADIOLOGY

## 2020-10-23 PROCEDURE — 250N000009 HC RX 250: Performed by: RADIOLOGY

## 2020-10-23 PROCEDURE — 250N000011 HC RX IP 250 OP 636: Performed by: RADIOLOGY

## 2020-10-23 PROCEDURE — 27096 INJECT SACROILIAC JOINT: CPT | Mod: RT

## 2020-10-23 RX ORDER — BUPIVACAINE HYDROCHLORIDE 5 MG/ML
3 INJECTION, SOLUTION EPIDURAL; INTRACAUDAL ONCE
Status: COMPLETED | OUTPATIENT
Start: 2020-10-23 | End: 2020-10-23

## 2020-10-23 RX ORDER — LIDOCAINE HYDROCHLORIDE 10 MG/ML
2 INJECTION, SOLUTION INFILTRATION; PERINEURAL ONCE
Status: COMPLETED | OUTPATIENT
Start: 2020-10-23 | End: 2020-10-23

## 2020-10-23 RX ORDER — TRIAMCINOLONE ACETONIDE 40 MG/ML
40 INJECTION, SUSPENSION INTRA-ARTICULAR; INTRAMUSCULAR ONCE
Status: COMPLETED | OUTPATIENT
Start: 2020-10-23 | End: 2020-10-23

## 2020-10-23 RX ADMIN — TRIAMCINOLONE ACETONIDE 40 MG: 40 INJECTION, SUSPENSION INTRA-ARTICULAR; INTRAMUSCULAR at 10:15

## 2020-10-23 RX ADMIN — IOHEXOL 0.5 ML: 300 INJECTION, SOLUTION INTRAVENOUS at 10:16

## 2020-10-23 RX ADMIN — BUPIVACAINE HYDROCHLORIDE 3 ML: 5 INJECTION, SOLUTION EPIDURAL; INTRACAUDAL at 10:15

## 2020-10-23 RX ADMIN — LIDOCAINE HYDROCHLORIDE 2 ML: 10 INJECTION, SOLUTION INFILTRATION; PERINEURAL at 10:16

## 2020-11-17 NOTE — PROGRESS NOTES
REVIEW OF RECOMMENDED SCREENINGS:    Colonoscopy: Done in 10/28/2013, repeat in 2023  Breast Exam/Mammography: Done in November, 2019, repeat November, 2020, going at 9 am  Pap smear: No longer performing due to age and patient request unless symptomatic.  DEXA: Never done, will order today.  Immunizations: Due for influenza.  Ordered for today. UTD on other vaccines.   Lipids/Annual Exam: Done in 2019, repeat today  Hepatitis C screen: 9/30/2015    Nursing Notes:   Shelby Lee LPN  11/19/2020  9:02 AM  Signed  Patient presents to the clinic for an annual physical. Also adds concerns over some R sided lingering hip pain.   Shelby Lee LPN on 11/19/2020 at 9:02 AM    Nursing note reviewed with patient.  Accuracy and completeness verified.    SUBJECTIVE:                                                      Shirley Hollins is a 64 year old female patient who presents to the clinic today for annual physical and recommended lab screenings..  RAH Score:  RAH-7 SCORE 11/3/2017 11/6/2018 11/19/2020   Total Score 1 0 1        PHQ-2 Score:     PHQ-2 ( 1999 Pf[employer]) 11/19/2020 10/16/2020   Q1: Little interest or pleasure in doing things 0 0   Q2: Feeling down, depressed or hopeless 0 0   PHQ-2 Score 0 0         Tobacco Use      Smoking status: Former Smoker        Packs/day: 0.15        Years: 20.00        Pack years: 3        Types: Cigarettes        Quit date: 1/1/2005        Years since quitting: 15.8      Smokeless tobacco: Never Used    Problem List/PMH: Reviewed in EMR, and made relevant updates today.  Medications: Reviewed in EMR, and made relevant updates today.  Allergies: Reviewed in EMR, and made relevant updates today.    Current Code Status:  No Order    REVIEW OF SYSTEMS:    Review of Systems   Musculoskeletal: Positive for arthralgias, back pain and myalgias.   Psychiatric/Behavioral: Positive for sleep disturbance. The patient is nervous/anxious.    All other systems reviewed and are  "negative.      OBJECTIVE:                                                      EXAM:     /68 (BP Location: Right arm, Patient Position: Sitting, Cuff Size: Adult Regular)   Pulse 72   Temp 97.7  F (36.5  C) (Tympanic)   Resp 16   Ht 1.626 m (5' 4\")   Wt 64.3 kg (141 lb 12.8 oz)   SpO2 99%   BMI 24.34 kg/m      Current Pain Score: Mild Pain (2)     Physical Exam  Vitals signs and nursing note reviewed.   Constitutional:       Appearance: Normal appearance.   HENT:      Head: Normocephalic and atraumatic.      Right Ear: Tympanic membrane, ear canal and external ear normal.      Left Ear: Tympanic membrane, ear canal and external ear normal.      Mouth/Throat:      Mouth: Mucous membranes are moist.      Pharynx: Oropharynx is clear.   Eyes:      Extraocular Movements: Extraocular movements intact.      Conjunctiva/sclera: Conjunctivae normal.      Pupils: Pupils are equal, round, and reactive to light.   Neck:      Musculoskeletal: Normal range of motion and neck supple.   Cardiovascular:      Rate and Rhythm: Normal rate and regular rhythm.      Heart sounds: Normal heart sounds.   Pulmonary:      Effort: Pulmonary effort is normal.      Breath sounds: Normal breath sounds.   Abdominal:      General: Bowel sounds are normal.      Palpations: Abdomen is soft.   Musculoskeletal: Normal range of motion.   Lymphadenopathy:      Cervical: No cervical adenopathy.   Skin:     General: Skin is warm and dry.   Neurological:      Mental Status: She is alert and oriented to person, place, and time. Mental status is at baseline.   Psychiatric:         Mood and Affect: Mood normal.         Behavior: Behavior normal.         Thought Content: Thought content normal.         Judgment: Judgment normal.        Diagnostics Completed at this Visit:  Results for orders placed or performed during the hospital encounter of 11/19/20   MA Screening Digital Bilateral     Status: None    Narrative    EXAM: MA SCREENING DIGITAL " BILATERAL, 11/19/2020 9:26 AM    COMPARISONS: 11/18/2019 through 10/6/2015    HISTORY:64 years  Female  YES  Visit for screening mammogram    FINDINGS: No suspicious mass or microcalcification is seen in either  breast.    BREAST DENSITY: Heterogeneously dense.      Impression    IMPRESSION: BI-RADS CATEGORY: 1 -  Negative.    RECOMMENDED FOLLOW-UP: Annual Mammography.      DILCIA SOLOMON MD   Results for orders placed or performed in visit on 11/19/20   Hemoglobin A1c     Status: None   Result Value Ref Range    Hemoglobin A1C 5.5 4.0 - 6.0 %   Thyrotropin GH     Status: None   Result Value Ref Range    Thyrotropin 4.06 0.34 - 5.60 IU/mL   Comprehensive metabolic panel     Status: None   Result Value Ref Range    Sodium 137 134 - 144 mmol/L    Potassium 4.1 3.5 - 5.1 mmol/L    Chloride 102 98 - 107 mmol/L    Carbon Dioxide 30 21 - 31 mmol/L    Anion Gap 5 3 - 14 mmol/L    Glucose 100 70 - 105 mg/dL    Urea Nitrogen 15 7 - 25 mg/dL    Creatinine 0.74 0.60 - 1.20 mg/dL    GFR Estimate 80 >60 mL/min/[1.73_m2]    GFR Estimate If Black >90 >60 mL/min/[1.73_m2]    Calcium 9.7 8.6 - 10.3 mg/dL    Bilirubin Total 0.5 0.3 - 1.0 mg/dL    Albumin 4.5 3.5 - 5.7 g/dL    Protein Total 7.1 6.4 - 8.9 g/dL    Alkaline Phosphatase 58 34 - 104 U/L    ALT 17 7 - 52 U/L    AST 15 13 - 39 U/L   CBC with platelets differential     Status: None   Result Value Ref Range    WBC 4.1 4.0 - 11.0 10e9/L    RBC Count 4.57 3.8 - 5.2 10e12/L    Hemoglobin 14.2 11.7 - 15.7 g/dL    Hematocrit 43.6 35.0 - 47.0 %    MCV 95 78 - 100 fl    MCH 31.1 26.5 - 33.0 pg    MCHC 32.6 31.5 - 36.5 g/dL    RDW 12.4 10.0 - 15.0 %    Platelet Count 245 150 - 450 10e9/L    Diff Method Automated Method     % Neutrophils 45.5 %    % Lymphocytes 43.3 %    % Monocytes 8.0 %    % Eosinophils 1.7 %    % Basophils 1.0 %    % Immature Granulocytes 0.5 %    Absolute Neutrophil 1.9 1.6 - 8.3 10e9/L    Absolute Lymphocytes 1.8 0.8 - 5.3 10e9/L    Absolute Monocytes 0.3 0.0 -  1.3 10e9/L    Absolute Eosinophils 0.1 0.0 - 0.7 10e9/L    Absolute Basophils 0.0 0.0 - 0.2 10e9/L    Abs Immature Granulocytes 0.0 0 - 0.4 10e9/L   Lipid Profile     Status: Abnormal   Result Value Ref Range    Cholesterol 222 (H) <200 mg/dL    Triglycerides 92 <150 mg/dL    HDL Cholesterol 53 23 - 92 mg/dL    LDL Cholesterol Calculated 151 (H) <100 mg/dL    Non HDL Cholesterol 169 (H) <130 mg/dL        ASSESSMENT/PLAN:                                                      1. Encounter for annual physical exam  Recommended screenings reviewed, ordered and or updated today.  - Lipid Profile; cholesterol is elevated, HDL is normal at 53 LDL is elevated at 151 non-HDL elevated at 169.  Advised patient I would give her 3 months to improve her diet and exercise program and then we will recheck levels at that time.  If they are still elevated we will start her on a statin for future cardiac prevention.  - GH-IMM- FLU VAC PRESRV FREE QUAD SPLIT VIR > 6 MONTHS IM, given today in clinic  - CBC with platelets differential; unremarkable  - Comprehensive metabolic panel; unremarkable  - Thyrotropin GH; within normal range at 4.06  - Hemoglobin A1c; within normal range at 5.5    2. Anxiety  Well-managed on clonazepam.  Continue with no dose change.    3. Leukocytosis, unspecified type  - CBC with platelets differential; see above    4. Leukopenia, unspecified type  - CBC with platelets differential; see above    5. Elevated glucose  Continue to work on diet improvement and daily exercise  - Hemoglobin A1c; see above    6. Hypothyroidism, unspecified type  Well-controlled on current dosing of levothyroxine  - Thyrotropin GH; see above    7. Panic anxiety syndrome  Well managed with clonazepam.  Continue on current dosing.    8. Screening for diabetes mellitus  - Hemoglobin A1c; as above    9. Mixed hyperlipidemia  Elevated cholesterol levels.  We will give her 3 months to try to improve these with diet and exercise otherwise we  will start her on a statin.  Recheck in 3 months.  - Lipid Profile; as above    10. High risk medication use    The 10-year ASCVD risk score (Northomemaximo CALLAWAY Jr., et al., 2013) is: 3.9%    Values used to calculate the score:      Age: 64 years      Sex: Female      Is Non- : No      Diabetic: No      Tobacco smoker: No      Systolic Blood Pressure: 120 mmHg      Is BP treated: No      HDL Cholesterol: 53 mg/dL      Total Cholesterol: 222 mg/dL      Patient verbalizes understanding and is agreeable to plan of care.    Return in about 3 months (around 2/19/2021) for Lab Work, Recheck.       DAR Gold, WOJCIECH-C  Internal Medicine  [facility]    11/20/2020 4:54 PM    Portions of this note were dictated using speech recognition software. The note has been proofread but errors in the text may have been overlooked. Please contact me if there are any concerns regarding the accuracy of the dictation.

## 2020-11-19 ENCOUNTER — HOSPITAL ENCOUNTER (OUTPATIENT)
Dept: MAMMOGRAPHY | Facility: OTHER | Age: 65
End: 2020-11-19
Attending: INTERNAL MEDICINE
Payer: COMMERCIAL

## 2020-11-19 ENCOUNTER — OFFICE VISIT (OUTPATIENT)
Dept: INTERNAL MEDICINE | Facility: OTHER | Age: 65
End: 2020-11-19
Attending: NURSE PRACTITIONER
Payer: COMMERCIAL

## 2020-11-19 VITALS
RESPIRATION RATE: 16 BRPM | SYSTOLIC BLOOD PRESSURE: 120 MMHG | BODY MASS INDEX: 24.21 KG/M2 | HEART RATE: 72 BPM | DIASTOLIC BLOOD PRESSURE: 68 MMHG | WEIGHT: 141.8 LBS | HEIGHT: 64 IN | OXYGEN SATURATION: 99 % | TEMPERATURE: 97.7 F

## 2020-11-19 DIAGNOSIS — M54.41 ACUTE RIGHT-SIDED LOW BACK PAIN WITH RIGHT-SIDED SCIATICA: ICD-10-CM

## 2020-11-19 DIAGNOSIS — D72.829 LEUKOCYTOSIS, UNSPECIFIED TYPE: ICD-10-CM

## 2020-11-19 DIAGNOSIS — F41.0 PANIC ANXIETY SYNDROME: ICD-10-CM

## 2020-11-19 DIAGNOSIS — D72.819 LEUKOPENIA, UNSPECIFIED TYPE: ICD-10-CM

## 2020-11-19 DIAGNOSIS — Z13.1 SCREENING FOR DIABETES MELLITUS: ICD-10-CM

## 2020-11-19 DIAGNOSIS — E78.2 MIXED HYPERLIPIDEMIA: ICD-10-CM

## 2020-11-19 DIAGNOSIS — E03.9 HYPOTHYROIDISM, UNSPECIFIED TYPE: ICD-10-CM

## 2020-11-19 DIAGNOSIS — Z12.31 VISIT FOR SCREENING MAMMOGRAM: ICD-10-CM

## 2020-11-19 DIAGNOSIS — F41.9 ANXIETY: ICD-10-CM

## 2020-11-19 DIAGNOSIS — R73.09 ELEVATED GLUCOSE: ICD-10-CM

## 2020-11-19 DIAGNOSIS — Z79.899 HIGH RISK MEDICATION USE: ICD-10-CM

## 2020-11-19 DIAGNOSIS — Z00.00 ENCOUNTER FOR ANNUAL PHYSICAL EXAM: Primary | ICD-10-CM

## 2020-11-19 DIAGNOSIS — Z13.9 SCREENING FOR CONDITION: ICD-10-CM

## 2020-11-19 LAB
ALBUMIN SERPL-MCNC: 4.5 G/DL (ref 3.5–5.7)
ALP SERPL-CCNC: 58 U/L (ref 34–104)
ALT SERPL W P-5'-P-CCNC: 17 U/L (ref 7–52)
ANION GAP SERPL CALCULATED.3IONS-SCNC: 5 MMOL/L (ref 3–14)
AST SERPL W P-5'-P-CCNC: 15 U/L (ref 13–39)
BASOPHILS # BLD AUTO: 0 10E9/L (ref 0–0.2)
BASOPHILS NFR BLD AUTO: 1 %
BILIRUB SERPL-MCNC: 0.5 MG/DL (ref 0.3–1)
BUN SERPL-MCNC: 15 MG/DL (ref 7–25)
CALCIUM SERPL-MCNC: 9.7 MG/DL (ref 8.6–10.3)
CHLORIDE SERPL-SCNC: 102 MMOL/L (ref 98–107)
CHOLEST SERPL-MCNC: 222 MG/DL
CO2 SERPL-SCNC: 30 MMOL/L (ref 21–31)
CREAT SERPL-MCNC: 0.74 MG/DL (ref 0.6–1.2)
DIFFERENTIAL METHOD BLD: NORMAL
EOSINOPHIL # BLD AUTO: 0.1 10E9/L (ref 0–0.7)
EOSINOPHIL NFR BLD AUTO: 1.7 %
ERYTHROCYTE [DISTWIDTH] IN BLOOD BY AUTOMATED COUNT: 12.4 % (ref 10–15)
GFR SERPL CREATININE-BSD FRML MDRD: 80 ML/MIN/{1.73_M2}
GLUCOSE SERPL-MCNC: 100 MG/DL (ref 70–105)
HBA1C MFR BLD: 5.5 % (ref 4–6)
HCT VFR BLD AUTO: 43.6 % (ref 35–47)
HDLC SERPL-MCNC: 53 MG/DL (ref 23–92)
HGB BLD-MCNC: 14.2 G/DL (ref 11.7–15.7)
IMM GRANULOCYTES # BLD: 0 10E9/L (ref 0–0.4)
IMM GRANULOCYTES NFR BLD: 0.5 %
LDLC SERPL CALC-MCNC: 151 MG/DL
LYMPHOCYTES # BLD AUTO: 1.8 10E9/L (ref 0.8–5.3)
LYMPHOCYTES NFR BLD AUTO: 43.3 %
MCH RBC QN AUTO: 31.1 PG (ref 26.5–33)
MCHC RBC AUTO-ENTMCNC: 32.6 G/DL (ref 31.5–36.5)
MCV RBC AUTO: 95 FL (ref 78–100)
MONOCYTES # BLD AUTO: 0.3 10E9/L (ref 0–1.3)
MONOCYTES NFR BLD AUTO: 8 %
NEUTROPHILS # BLD AUTO: 1.9 10E9/L (ref 1.6–8.3)
NEUTROPHILS NFR BLD AUTO: 45.5 %
NONHDLC SERPL-MCNC: 169 MG/DL
PLATELET # BLD AUTO: 245 10E9/L (ref 150–450)
POTASSIUM SERPL-SCNC: 4.1 MMOL/L (ref 3.5–5.1)
PROT SERPL-MCNC: 7.1 G/DL (ref 6.4–8.9)
RBC # BLD AUTO: 4.57 10E12/L (ref 3.8–5.2)
SODIUM SERPL-SCNC: 137 MMOL/L (ref 134–144)
TRIGL SERPL-MCNC: 92 MG/DL
TSH SERPL DL<=0.05 MIU/L-ACNC: 4.06 IU/ML (ref 0.34–5.6)
WBC # BLD AUTO: 4.1 10E9/L (ref 4–11)

## 2020-11-19 PROCEDURE — 80053 COMPREHEN METABOLIC PANEL: CPT | Mod: ZL | Performed by: NURSE PRACTITIONER

## 2020-11-19 PROCEDURE — 77067 SCR MAMMO BI INCL CAD: CPT

## 2020-11-19 PROCEDURE — 90686 IIV4 VACC NO PRSV 0.5 ML IM: CPT | Performed by: NURSE PRACTITIONER

## 2020-11-19 PROCEDURE — 90471 IMMUNIZATION ADMIN: CPT | Performed by: NURSE PRACTITIONER

## 2020-11-19 PROCEDURE — 36415 COLL VENOUS BLD VENIPUNCTURE: CPT | Mod: ZL | Performed by: NURSE PRACTITIONER

## 2020-11-19 PROCEDURE — 99396 PREV VISIT EST AGE 40-64: CPT | Mod: 25 | Performed by: NURSE PRACTITIONER

## 2020-11-19 PROCEDURE — 80061 LIPID PANEL: CPT | Mod: ZL | Performed by: NURSE PRACTITIONER

## 2020-11-19 PROCEDURE — 83036 HEMOGLOBIN GLYCOSYLATED A1C: CPT | Mod: ZL | Performed by: NURSE PRACTITIONER

## 2020-11-19 PROCEDURE — 84443 ASSAY THYROID STIM HORMONE: CPT | Mod: ZL | Performed by: NURSE PRACTITIONER

## 2020-11-19 PROCEDURE — 85025 COMPLETE CBC W/AUTO DIFF WBC: CPT | Mod: ZL | Performed by: NURSE PRACTITIONER

## 2020-11-19 RX ORDER — FAMOTIDINE 40 MG/1
40 TABLET, FILM COATED ORAL DAILY
COMMUNITY
End: 2023-06-07 | Stop reason: ALTCHOICE

## 2020-11-19 RX ORDER — LEVOTHYROXINE SODIUM 100 UG/1
100 TABLET ORAL DAILY
Qty: 90 TABLET | Refills: 3 | Status: SHIPPED | OUTPATIENT
Start: 2020-11-19 | End: 2020-11-25

## 2020-11-19 RX ORDER — CLONAZEPAM 0.5 MG/1
TABLET ORAL
Qty: 60 TABLET | Refills: 1 | Status: SHIPPED | OUTPATIENT
Start: 2020-11-19 | End: 2021-04-05

## 2020-11-19 RX ORDER — IBUPROFEN 800 MG/1
800 TABLET, FILM COATED ORAL EVERY 8 HOURS PRN
Qty: 60 TABLET | Refills: 1 | Status: SHIPPED | OUTPATIENT
Start: 2020-11-19 | End: 2021-06-28

## 2020-11-19 SDOH — HEALTH STABILITY: MENTAL HEALTH: HOW OFTEN DO YOU HAVE A DRINK CONTAINING ALCOHOL?: 2-3 TIMES A WEEK

## 2020-11-19 SDOH — HEALTH STABILITY: MENTAL HEALTH: HOW MANY STANDARD DRINKS CONTAINING ALCOHOL DO YOU HAVE ON A TYPICAL DAY?: 1 OR 2

## 2020-11-19 SDOH — HEALTH STABILITY: MENTAL HEALTH: HOW OFTEN DO YOU HAVE 6 OR MORE DRINKS ON ONE OCCASION?: NEVER

## 2020-11-19 ASSESSMENT — ENCOUNTER SYMPTOMS
BACK PAIN: 1
NERVOUS/ANXIOUS: 1
ARTHRALGIAS: 1
SLEEP DISTURBANCE: 1
MYALGIAS: 1

## 2020-11-19 ASSESSMENT — ANXIETY QUESTIONNAIRES
2. NOT BEING ABLE TO STOP OR CONTROL WORRYING: NOT AT ALL
7. FEELING AFRAID AS IF SOMETHING AWFUL MIGHT HAPPEN: NOT AT ALL
GAD7 TOTAL SCORE: 1
5. BEING SO RESTLESS THAT IT IS HARD TO SIT STILL: NOT AT ALL
6. BECOMING EASILY ANNOYED OR IRRITABLE: NOT AT ALL
1. FEELING NERVOUS, ANXIOUS, OR ON EDGE: SEVERAL DAYS
3. WORRYING TOO MUCH ABOUT DIFFERENT THINGS: NOT AT ALL

## 2020-11-19 ASSESSMENT — MIFFLIN-ST. JEOR: SCORE: 1188.2

## 2020-11-19 ASSESSMENT — PATIENT HEALTH QUESTIONNAIRE - PHQ9: 5. POOR APPETITE OR OVEREATING: NOT AT ALL

## 2020-11-19 ASSESSMENT — PAIN SCALES - GENERAL: PAINLEVEL: MILD PAIN (2)

## 2020-11-19 NOTE — NURSING NOTE
Patient presents to the clinic for an annual physical. Also adds concerns over some R sided lingering hip pain.   Shelby Lee LPN on 11/19/2020 at 9:02 AM

## 2020-11-20 ENCOUNTER — MYC MEDICAL ADVICE (OUTPATIENT)
Dept: INTERNAL MEDICINE | Facility: OTHER | Age: 65
End: 2020-11-20

## 2020-11-20 DIAGNOSIS — E03.9 HYPOTHYROIDISM, UNSPECIFIED TYPE: ICD-10-CM

## 2020-11-20 ASSESSMENT — ANXIETY QUESTIONNAIRES: GAD7 TOTAL SCORE: 1

## 2020-11-25 RX ORDER — LEVOTHYROXINE SODIUM 112 UG/1
112 TABLET ORAL DAILY
Qty: 90 TABLET | Refills: 0 | Status: SHIPPED | OUTPATIENT
Start: 2020-11-25 | End: 2020-12-31

## 2020-12-01 ENCOUNTER — HOSPITAL ENCOUNTER (OUTPATIENT)
Dept: GENERAL RADIOLOGY | Facility: OTHER | Age: 65
Discharge: HOME | End: 2020-12-01
Attending: NURSE PRACTITIONER | Admitting: NURSE PRACTITIONER
Payer: COMMERCIAL

## 2020-12-01 DIAGNOSIS — M54.41 ACUTE RIGHT-SIDED LOW BACK PAIN WITH RIGHT-SIDED SCIATICA: ICD-10-CM

## 2020-12-01 PROCEDURE — 27096 INJECT SACROILIAC JOINT: CPT | Mod: RT

## 2020-12-01 PROCEDURE — 255N000002 HC RX 255 OP 636: Performed by: RADIOLOGY

## 2020-12-01 PROCEDURE — 250N000009 HC RX 250: Performed by: RADIOLOGY

## 2020-12-01 PROCEDURE — 250N000011 HC RX IP 250 OP 636: Performed by: RADIOLOGY

## 2020-12-01 RX ORDER — TRIAMCINOLONE ACETONIDE 40 MG/ML
40 INJECTION, SUSPENSION INTRA-ARTICULAR; INTRAMUSCULAR ONCE
Status: COMPLETED | OUTPATIENT
Start: 2020-12-01 | End: 2020-12-01

## 2020-12-01 RX ORDER — LIDOCAINE HYDROCHLORIDE 10 MG/ML
2 INJECTION, SOLUTION INFILTRATION; PERINEURAL ONCE
Status: COMPLETED | OUTPATIENT
Start: 2020-12-01 | End: 2020-12-01

## 2020-12-01 RX ORDER — BUPIVACAINE HYDROCHLORIDE 5 MG/ML
3 INJECTION, SOLUTION EPIDURAL; INTRACAUDAL ONCE
Status: COMPLETED | OUTPATIENT
Start: 2020-12-01 | End: 2020-12-01

## 2020-12-01 RX ADMIN — IOHEXOL 2 ML: 240 INJECTION, SOLUTION INTRATHECAL; INTRAVASCULAR; INTRAVENOUS; ORAL at 09:30

## 2020-12-01 RX ADMIN — LIDOCAINE HYDROCHLORIDE 2 ML: 10 INJECTION, SOLUTION INFILTRATION; PERINEURAL at 09:31

## 2020-12-01 RX ADMIN — TRIAMCINOLONE ACETONIDE 40 MG: 40 INJECTION, SUSPENSION INTRA-ARTICULAR; INTRAMUSCULAR at 09:31

## 2020-12-01 RX ADMIN — BUPIVACAINE HYDROCHLORIDE 3 ML: 5 INJECTION, SOLUTION EPIDURAL; INTRACAUDAL at 09:30

## 2020-12-23 ENCOUNTER — HOSPITAL ENCOUNTER (OUTPATIENT)
Dept: BONE DENSITY | Facility: OTHER | Age: 65
Discharge: HOME | End: 2020-12-23
Attending: NURSE PRACTITIONER | Admitting: NURSE PRACTITIONER
Payer: COMMERCIAL

## 2020-12-23 DIAGNOSIS — Z13.9 SCREENING FOR CONDITION: ICD-10-CM

## 2020-12-23 PROBLEM — M85.88 OSTEOPENIA OF LUMBAR SPINE: Status: ACTIVE | Noted: 2018-11-06

## 2020-12-23 PROCEDURE — 77080 DXA BONE DENSITY AXIAL: CPT

## 2020-12-30 DIAGNOSIS — E03.9 HYPOTHYROIDISM, UNSPECIFIED TYPE: ICD-10-CM

## 2020-12-30 LAB — TSH SERPL DL<=0.05 MIU/L-ACNC: 0.63 IU/ML (ref 0.34–5.6)

## 2020-12-30 PROCEDURE — 36415 COLL VENOUS BLD VENIPUNCTURE: CPT | Mod: ZL | Performed by: INTERNAL MEDICINE

## 2020-12-30 PROCEDURE — 84443 ASSAY THYROID STIM HORMONE: CPT | Mod: ZL | Performed by: INTERNAL MEDICINE

## 2020-12-31 DIAGNOSIS — E03.9 HYPOTHYROIDISM, UNSPECIFIED TYPE: ICD-10-CM

## 2020-12-31 RX ORDER — LEVOTHYROXINE SODIUM 112 UG/1
112 TABLET ORAL DAILY
Qty: 90 TABLET | Refills: 3 | Status: SHIPPED | OUTPATIENT
Start: 2020-12-31 | End: 2021-11-23

## 2021-03-02 ENCOUNTER — MYC MEDICAL ADVICE (OUTPATIENT)
Dept: INTERNAL MEDICINE | Facility: OTHER | Age: 66
End: 2021-03-02

## 2021-03-02 DIAGNOSIS — M53.3 SI (SACROILIAC) JOINT DYSFUNCTION: Primary | ICD-10-CM

## 2021-03-02 NOTE — TELEPHONE ENCOUNTER
Right SI therapeutic injection was done on 12/1/20.  Are you able to order another or does she need to be seen?  Carly Day CMA ([facility])

## 2021-03-09 ENCOUNTER — TELEPHONE (OUTPATIENT)
Dept: INTERNAL MEDICINE | Facility: OTHER | Age: 66
End: 2021-03-09

## 2021-03-10 NOTE — TELEPHONE ENCOUNTER
I was [facility] on the [facility] which I have not received yet.

## 2021-03-10 NOTE — TELEPHONE ENCOUNTER
"She is supposed to be able to get 4 injections, but the one for March (which would have only been #3) has been denied stating it isn't medically necessary. She says the hip is very painful but she is wondering if there is some other issue. She says she feels it may be piriformis, has an indent in her \"butt cheek\". Should she come back in to see someone to review everything and reassess the plan going forward?   Nolvia Lambert CMA([facility])..................3/10/2021   3:00 PM   "

## 2021-03-11 NOTE — TELEPHONE ENCOUNTER
After proper verification, patient was relayed message and set up with an appointment for tomorrow.  Elsy Calloway LPN on 3/11/2021 at 9:09 AM

## 2021-03-11 NOTE — PROGRESS NOTES
Shirley Hollins  : 1955 Age: 65 year old Sex: female MRN: [id number]    CC:   Chief Complaint   Patient presents with     RECHECK     hip pain      HPI:   Shirley is a 65-year-old female patient who is being seen today for updates regarding her right-sided lower back pelvis pain which radiates down into her right lateral leg.  She was scheduled to have for therapeutic injections in her SI joint and received the first 2, went to have the third 1 done and it was denied by her insurance.  [id number] review was completed on 3/11/21.  She was instructed to come in for updates.    Today she reports ongoing pain rated 6 out of 10 which has continually worsened.  Pain is reported as a deep burning in her central right buttock and radiates around her right hip down into the lateral aspect of her right thigh.  She continues to do yoga daily, and follows a daily exercise program given to her by physical therapy.  She has completed physical therapy from 2020 until .  She has been doing home exercises per physical therapy from 2020 to present.    Imaging was completed of her lumbar spine 2020.  At that time she was referred to physical therapy for evaluation and treatment.    * X-ray of her pelvis was completed 2020.  * X-ray of her SI joints was also completed 2020.  * MRI of lumbar spine was completed 2020.  * She had an office visit 2020 at which time SI therapeutic injections were ordered.  * She received her first SI therapeutic joint injection on 2020 and experienced greater than 75% pain relief and increase in function for approximately 4 weeks.    * She received her second therapeutic SI joint injection on 2020 and reports she experienced greater than 75% pain relief and increase in function for approximately 6 weeks.    * She had scheduled therapeutic SI joint injection #3 on 2021 and was  denied.    ASSESSMENT AND PLAN:    Bilateral low back pain with right-sided sciatica, unspecified chronicity  Patient will continue her daily yoga and daily home exercise program per physical therapy recommendations.  We will attempt to schedule her for her third SI therapeutic joint injection to see if that offers her continued relief since she has received greater than 75% relief of pain with prior to injections.    Weakness of right leg  This seems to be improving.  Patient reports she no longer has below the knee weakness; it is in her upper right thigh only.    Osteopenia of lumbar spine  Continue vitamin D therapy.  Continue adequate calcium intake through dietary intake.    SI (sacroiliac) joint dysfunction  Patient will continue her daily yoga and daily home exercise program per physical therapy recommendations.  We will attempt to schedule her for her third SI therapeutic joint injection to see if that offers her continued relief since she has received greater than 75% relief of pain with prior to injections.     We also discussed implementation of the following exercise to her current daily regimen:  Patient Instructions     Patient Education     Side Lying Hip Abduction (Strength)    1. Lie down on the floor on your side. Rest your head on your arm. Bend your legs at the knees.  2. Keep your feet together and lift your top leg up so that your knees are . Keep your hips steady.     3. Slowly lower your leg back down.  4. Repeat 10 times, or as instructed.  5. Switch sides if instructed.   Challenge yourself  Put an elastic band or tubing around your thighs. Raise and lower your top leg slowly and steadily.   [employer] last reviewed this educational content on 2/1/2020 2000-2020 [employer]. All rights reserved. This information is not intended as a substitute for professional medical care. Always follow your healthcare professional's instructions.               Cherelle Dean  DARLINE DODGE  Internal Medicine  03/12/2021 10:13 AM    I explained my diagnostic considerations and recommendations to the patient, who voiced understanding and agreement with the treatment plan. All questions were answered. We discussed potential side effects of any prescribed or recommended therapies, as well as expectations for response to treatments.     Return if symptoms worsen or fail to improve.       Nursing Notes:   Elsy Calloway LPN  3/12/2021  9:35 AM  Signed  Chief Complaint   Patient presents with     RECHECK     hip pain    patient presents to the clinic today for a follow up for pain in hip    Medication Reconciliation: completed   Elsy Calloway LPN  3/12/2021 9:24 AM     Nursing note reviewed with patient.  Accuracy and completeness verified.      SUBJECTIVE:                                                      RAH Score:    RAH-7 SCORE 11/3/2017 11/6/2018 11/19/2020   Total Score 1 0 1      PHQ-2 Score:     PHQ-2 ( 1999 [employer]) 3/12/2021 11/19/2020   Q1: Little interest or pleasure in doing things 0 0   Q2: Feeling down, depressed or hopeless 0 0   PHQ-2 Score 0 0      Problem List/PMH: Reviewed in EMR, and made relevant updates today.  Medications: Reviewed in EMR, and made relevant updates today.  Allergies: Reviewed in EMR, and made relevant updates today.    Current Code Status:  No Order    REVIEW OF SYSTEMS:    Review of Systems   Constitutional: Positive for activity change (Due to increased pain in right buttock hip and leg).   Musculoskeletal: Positive for back pain (Right-sided low back sacral), gait problem (Increased pain with ambulation on right side hip and leg) and myalgias (Right thigh).   All other systems reviewed and are negative.      OBJECTIVE:                                                      EXAM:     /80 (BP Location: Right arm, Patient Position: Sitting, Cuff Size: Adult Regular)   Pulse 73   Temp 97.6  F (36.4  C) (Tympanic)   Resp 16   Wt 66.9 kg (147 lb  6.4 oz)   SpO2 99%   BMI 25.30 kg/m      Current Pain Score: Severe Pain (6)     Physical Exam  Vitals signs and nursing note reviewed.   Constitutional:       Appearance: Normal appearance.   HENT:      Head: Normocephalic and atraumatic.   Cardiovascular:      Rate and Rhythm: Normal rate and regular rhythm.      Heart sounds: Normal heart sounds.   Pulmonary:      Effort: Pulmonary effort is normal.      Breath sounds: Normal breath sounds.   Musculoskeletal:         General: Tenderness present.      Lumbar back: She exhibits decreased range of motion, tenderness and pain.        Back:       Right upper leg: She exhibits tenderness.        Legs:       Comments: Positive for right sided compression test, Gaenslen test and Dyana test.   Skin:     General: Skin is warm and dry.   Neurological:      Mental Status: She is alert and oriented to person, place, and time. Mental status is at baseline.   Psychiatric:         Mood and Affect: Mood normal.         Behavior: Behavior normal.         Thought Content: Thought content normal.         Judgment: Judgment normal.     Diagnostics Completed at this Visit:    No results found for any visits on 03/12/21.     Disclaimer:  This note consists of words and symbols derived from keyboarding, dictation, or using voice recognition software. As a result, there may be errors in the script that have gone undetected. Please consider this when interpreting information found in this note. Please contact me if there are any concerns regarding the accuracy of the dictation.     Total time spent with this patient was 35 minutes which included chart review, visualization and interpretation of labs and/or images, time spent with patient, and documentation.

## 2021-03-11 NOTE — TELEPHONE ENCOUNTER
Please let patient know we need to reevaluate her current condition prior to authorization of future injections per peer to peer review completed this morning. This can be a short visit. Thanks.

## 2021-03-12 ENCOUNTER — OFFICE VISIT (OUTPATIENT)
Dept: INTERNAL MEDICINE | Facility: OTHER | Age: 66
End: 2021-03-12
Attending: NURSE PRACTITIONER
Payer: COMMERCIAL

## 2021-03-12 VITALS
RESPIRATION RATE: 16 BRPM | TEMPERATURE: 97.6 F | DIASTOLIC BLOOD PRESSURE: 80 MMHG | WEIGHT: 147.4 LBS | HEART RATE: 73 BPM | BODY MASS INDEX: 25.3 KG/M2 | SYSTOLIC BLOOD PRESSURE: 132 MMHG | OXYGEN SATURATION: 99 %

## 2021-03-12 DIAGNOSIS — R29.898 WEAKNESS OF RIGHT LEG: ICD-10-CM

## 2021-03-12 DIAGNOSIS — M53.3 SI (SACROILIAC) JOINT DYSFUNCTION: ICD-10-CM

## 2021-03-12 DIAGNOSIS — M54.41 BILATERAL LOW BACK PAIN WITH RIGHT-SIDED SCIATICA, UNSPECIFIED CHRONICITY: Primary | ICD-10-CM

## 2021-03-12 DIAGNOSIS — M85.88 OSTEOPENIA OF LUMBAR SPINE: ICD-10-CM

## 2021-03-12 PROCEDURE — 99214 OFFICE O/P EST MOD 30 MIN: CPT | Performed by: NURSE PRACTITIONER

## 2021-03-12 ASSESSMENT — ENCOUNTER SYMPTOMS
BACK PAIN: 1
ACTIVITY CHANGE: 1
MYALGIAS: 1

## 2021-03-12 ASSESSMENT — PAIN SCALES - GENERAL: PAINLEVEL: SEVERE PAIN (6)

## 2021-03-12 NOTE — NURSING NOTE
Chief Complaint   Patient presents with     RECHECK     hip pain    patient presents to the clinic today for a follow up for pain in hip    Medication Reconciliation: completed   Elsy Calloway LPN  3/12/2021 9:24 AM

## 2021-03-12 NOTE — PATIENT INSTRUCTIONS
Patient Education     Side Lying Hip Abduction (Strength)    1. Lie down on the floor on your side. Rest your head on your arm. Bend your legs at the knees.  2. Keep your feet together and lift your top leg up so that your knees are . Keep your hips steady.     3. Slowly lower your leg back down.  4. Repeat 10 times, or as instructed.  5. Switch sides if instructed.   Challenge yourself  Put an elastic band or tubing around your thighs. Raise and lower your top leg slowly and steadily.   [employer] last reviewed this educational content on 2/1/2020 2000-2020 [employer]. All rights reserved. This information is not intended as a substitute for professional medical care. Always follow your healthcare professional's instructions.

## 2021-03-17 ENCOUNTER — MYC MEDICAL ADVICE (OUTPATIENT)
Dept: INTERNAL MEDICINE | Facility: OTHER | Age: 66
End: 2021-03-17

## 2021-03-17 NOTE — TELEPHONE ENCOUNTER
"See other message:    \"Let me do some checking.. I will get back to you.     Cherelle\"    Last read by Shirley Hollins at 10:07 AM on 3/17/2021.    "

## 2021-03-30 ENCOUNTER — HOSPITAL ENCOUNTER (OUTPATIENT)
Dept: GENERAL RADIOLOGY | Facility: OTHER | Age: 66
Discharge: HOME | End: 2021-03-30
Attending: INTERNAL MEDICINE | Admitting: INTERNAL MEDICINE
Payer: COMMERCIAL

## 2021-03-30 DIAGNOSIS — M53.3 SI (SACROILIAC) JOINT DYSFUNCTION: ICD-10-CM

## 2021-03-30 PROCEDURE — 250N000009 HC RX 250: Performed by: RADIOLOGY

## 2021-03-30 PROCEDURE — 255N000002 HC RX 255 OP 636: Performed by: RADIOLOGY

## 2021-03-30 PROCEDURE — 250N000011 HC RX IP 250 OP 636: Performed by: RADIOLOGY

## 2021-03-30 PROCEDURE — 27096 INJECT SACROILIAC JOINT: CPT | Mod: RT

## 2021-03-30 RX ORDER — BUPIVACAINE HYDROCHLORIDE 5 MG/ML
3 INJECTION, SOLUTION EPIDURAL; INTRACAUDAL ONCE
Status: COMPLETED | OUTPATIENT
Start: 2021-03-30 | End: 2021-03-30

## 2021-03-30 RX ORDER — TRIAMCINOLONE ACETONIDE 40 MG/ML
40 INJECTION, SUSPENSION INTRA-ARTICULAR; INTRAMUSCULAR ONCE
Status: COMPLETED | OUTPATIENT
Start: 2021-03-30 | End: 2021-03-30

## 2021-03-30 RX ORDER — LIDOCAINE HYDROCHLORIDE 10 MG/ML
2 INJECTION, SOLUTION INFILTRATION; PERINEURAL ONCE
Status: COMPLETED | OUTPATIENT
Start: 2021-03-30 | End: 2021-03-30

## 2021-03-30 RX ADMIN — IOHEXOL 2 ML: 240 INJECTION, SOLUTION INTRATHECAL; INTRAVASCULAR; INTRAVENOUS; ORAL at 16:13

## 2021-03-30 RX ADMIN — BUPIVACAINE HYDROCHLORIDE 3 ML: 5 INJECTION, SOLUTION EPIDURAL; INTRACAUDAL at 16:12

## 2021-03-30 RX ADMIN — LIDOCAINE HYDROCHLORIDE 2 ML: 10 INJECTION, SOLUTION INFILTRATION; PERINEURAL at 16:13

## 2021-03-30 RX ADMIN — TRIAMCINOLONE ACETONIDE 40 MG: 40 INJECTION, SUSPENSION INTRA-ARTICULAR; INTRAMUSCULAR at 16:13

## 2021-04-03 DIAGNOSIS — F41.9 ANXIETY: ICD-10-CM

## 2021-04-05 RX ORDER — CLONAZEPAM 0.5 MG/1
TABLET ORAL
Qty: 60 TABLET | Refills: 1 | Status: SHIPPED | OUTPATIENT
Start: 2021-04-05 | End: 2021-08-31

## 2021-04-05 NOTE — TELEPHONE ENCOUNTER
Routing refill request to provider for review/approval because:  Drug not on the [facility] refill protocol     LOV: 3/12/2021    Jerri Gardiner RN on 4/5/2021 at 10:22 AM

## 2021-06-02 ENCOUNTER — IMMUNIZATION (OUTPATIENT)
Dept: FAMILY MEDICINE | Facility: OTHER | Age: 66
End: 2021-06-02
Attending: FAMILY MEDICINE
Payer: COMMERCIAL

## 2021-06-02 PROCEDURE — 91300 PR COVID VAC PFIZER DIL RECON 30 MCG/0.3 ML IM: CPT

## 2021-06-02 PROCEDURE — 0001A PR COVID VAC PFIZER DIL RECON 30 MCG/0.3 ML IM: CPT

## 2021-06-23 ENCOUNTER — IMMUNIZATION (OUTPATIENT)
Dept: FAMILY MEDICINE | Facility: OTHER | Age: 66
End: 2021-06-23
Attending: FAMILY MEDICINE
Payer: COMMERCIAL

## 2021-06-23 PROCEDURE — 0002A PR COVID VAC PFIZER DIL RECON 30 MCG/0.3 ML IM: CPT

## 2021-06-23 PROCEDURE — 91300 PR COVID VAC PFIZER DIL RECON 30 MCG/0.3 ML IM: CPT

## 2021-06-26 DIAGNOSIS — M54.41 ACUTE RIGHT-SIDED LOW BACK PAIN WITH RIGHT-SIDED SCIATICA: ICD-10-CM

## 2021-06-28 RX ORDER — IBUPROFEN 800 MG/1
TABLET, FILM COATED ORAL
Qty: 60 TABLET | Refills: 1 | Status: SHIPPED | OUTPATIENT
Start: 2021-06-28 | End: 2022-01-04

## 2021-06-28 NOTE — TELEPHONE ENCOUNTER
Prescription approved per [facility] Refill Protocol.  LOV: 3/12/2021    Jerri Gardiner RN on 6/28/2021 at 9:31 AM

## 2021-07-06 ENCOUNTER — MYC MEDICAL ADVICE (OUTPATIENT)
Dept: INTERNAL MEDICINE | Facility: OTHER | Age: 66
End: 2021-07-06

## 2021-07-15 ENCOUNTER — OFFICE VISIT (OUTPATIENT)
Dept: FAMILY MEDICINE | Facility: OTHER | Age: 66
End: 2021-07-15
Attending: NURSE PRACTITIONER
Payer: COMMERCIAL

## 2021-07-15 VITALS
DIASTOLIC BLOOD PRESSURE: 64 MMHG | OXYGEN SATURATION: 100 % | BODY MASS INDEX: 24.68 KG/M2 | WEIGHT: 143.8 LBS | SYSTOLIC BLOOD PRESSURE: 122 MMHG | HEART RATE: 64 BPM | TEMPERATURE: 97.8 F | RESPIRATION RATE: 16 BRPM

## 2021-07-15 DIAGNOSIS — N30.00 ACUTE CYSTITIS WITHOUT HEMATURIA: Primary | ICD-10-CM

## 2021-07-15 DIAGNOSIS — R39.9 UTI SYMPTOMS: ICD-10-CM

## 2021-07-15 LAB
ALBUMIN UR-MCNC: NEGATIVE MG/DL
APPEARANCE UR: CLEAR
BACTERIA #/AREA URNS HPF: ABNORMAL /HPF
BILIRUB UR QL STRIP: NEGATIVE
COLOR UR AUTO: ABNORMAL
GLUCOSE UR STRIP-MCNC: NEGATIVE MG/DL
HGB UR QL STRIP: ABNORMAL
KETONES UR STRIP-MCNC: NEGATIVE MG/DL
LEUKOCYTE ESTERASE UR QL STRIP: ABNORMAL
MUCOUS THREADS #/AREA URNS LPF: PRESENT /LPF
NITRATE UR QL: NEGATIVE
PH UR STRIP: 6 [PH] (ref 5–9)
RBC URINE: 12 /HPF
SP GR UR STRIP: 1.01 (ref 1–1.03)
SQUAMOUS EPITHELIAL: 3 /HPF
UROBILINOGEN UR STRIP-MCNC: NORMAL MG/DL
WBC URINE: 37 /HPF

## 2021-07-15 PROCEDURE — 87086 URINE CULTURE/COLONY COUNT: CPT | Mod: ZL | Performed by: NURSE PRACTITIONER

## 2021-07-15 PROCEDURE — 87186 SC STD MICRODIL/AGAR DIL: CPT | Mod: ZL | Performed by: NURSE PRACTITIONER

## 2021-07-15 PROCEDURE — 81001 URINALYSIS AUTO W/SCOPE: CPT | Mod: ZL | Performed by: NURSE PRACTITIONER

## 2021-07-15 PROCEDURE — 99213 OFFICE O/P EST LOW 20 MIN: CPT | Performed by: NURSE PRACTITIONER

## 2021-07-15 RX ORDER — PHENAZOPYRIDINE HYDROCHLORIDE 200 MG/1
200 TABLET, FILM COATED ORAL 3 TIMES DAILY PRN
Qty: 10 TABLET | Refills: 0 | Status: SHIPPED | OUTPATIENT
Start: 2021-07-15 | End: 2021-10-19

## 2021-07-15 RX ORDER — NITROFURANTOIN 25; 75 MG/1; MG/1
100 CAPSULE ORAL 2 TIMES DAILY
Qty: 10 CAPSULE | Refills: 0 | Status: SHIPPED | OUTPATIENT
Start: 2021-07-15 | End: 2021-07-20

## 2021-07-15 ASSESSMENT — PAIN SCALES - GENERAL: PAINLEVEL: SEVERE PAIN (6)

## 2021-07-15 NOTE — NURSING NOTE
"Chief Complaint   Patient presents with     Urinary Problem     Patient is here for pressure and pain upon urination that started this morning.     Initial /64   Pulse 64   Temp 97.8  F (36.6  C) (Tympanic)   Resp 16   Wt 65.2 kg (143 lb 12.8 oz)   SpO2 100%   BMI 24.68 kg/m   Estimated body mass index is 24.68 kg/m  as calculated from the following:    Height as of 11/19/20: 1.626 m (5' 4\").    Weight as of this encounter: 65.2 kg (143 lb 12.8 oz).  Medication Reconciliation: complete    Katelyn Mtz LPN  "

## 2021-07-15 NOTE — PROGRESS NOTES
HPI:    Shirley Hollins is a 65 year old female  who presents to [facility] today for UTI    States she gets UTI if she doesn't drink enough water.  States none recently.  Symptoms started during the night last night as she didn't drink enough water yesterday while working.  Symptoms include dysuria, urinary spasm, suprapubic heaviness, decreased urine output, and miildly stronger urine odor.  No hematuria.  No urinary frequency or urgency.    No fevers or chills.  No nausea or vomiting.  Appetite normal.  No abdominal cramping or pain.  No diarrhea or cramping.  No back pain.  No vaginal itching or discharge.  No OTC medications.      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 Large inflamed synovial cyst and bursa. U of Dr. Zhou MACHADO     COLONOSCOPY      WNL     COLONOSCOPY      Adenomatous polyp     ESOPHAGOSCOPY, GASTROSCOPY, DUODENOSCOPY (EGD), COMBINED      ,gastritis and esophagitis; normal      HYSTERECTOMY TOTAL ABDOMINAL      with BSO (one ovary at a time)     LAPAROSCOPIC CHOLECYSTECTOMY       OOPHORECTOMY Right 1988     TONSILLECTOMY, ADENOIDECTOMY, COMBINED  1971     Social History     Tobacco Use     Smoking status: Former Smoker     Packs/day: 0.15     Years: 20.00     Pack years: 3.00     Types: Cigarettes     Quit date: 2005     Years since quittin.5     Smokeless tobacco: Never Used   Substance Use Topics     Alcohol use: Yes     Comment: Occasional     Current Outpatient Medications   Medication Sig Dispense Refill     cholecalciferol (VITAMIN D3) 1000 UNIT tablet Take 1 tablet (1,000 Units) by mouth daily 100 tablet 3     clonazePAM (KLONOPIN) 0.5 MG tablet Take 1 tablet by mouth twice daily as needed for anxiety 60 tablet 1     famotidine (PEPCID) 40 MG tablet Take 40 mg by mouth daily       ibuprofen (ADVIL/MOTRIN) 800 MG tablet TAKE 1 TABLET BY MOUTH EVERY 8 HOURS AS NEEDED FOR MODERATE PAIN 60 tablet 1     levothyroxine (SYNTHROID/LEVOTHROID) 112 MCG tablet Take 1 tablet (112 mcg) by mouth daily 90 tablet 3     NEW  mcg CBD Oil nightly as needed for sleep       Allergies   Allergen Reactions     Trazodone      Other reaction(s): Headache, Hypertension  RCVS         Past medical history, past surgical history, current medications and allergies reviewed and accurate to the best of my knowledge.        ROS:  Refer to HPI    /64   Pulse 64   Temp 97.8  F (36.6  C) (Tympanic)   Resp 16   Wt 65.2 kg (143 lb 12.8 oz)   SpO2 100%   BMI 24.68 kg/m      EXAM:  General Appearance: Well appearing adult female, non ill appearance, well dressed/groomed, appropriate appearance for age. No acute distress  Respiratory: normal chest wall and respirations.  Normal effort.  Clear to auscultation bilaterally, no wheezing, crackles or rhonchi.  No increased work of breathing.  No cough appreciated.  Cardiac: RRR with no murmurs  Abdomen: soft, nontender, no rigidity, no  rebound tenderness or guarding, normal bowel sounds present  :  No suprapubic tenderness to palpation.  No CVA tenderness to palpation.    Musculoskeletal:  Equal movement of bilateral upper extremities.  Equal movement of bilateral lower extremities.  Normal gait.    Psychological: normal affect, alert, oriented, and pleasant.       Labs:  Results for orders placed or performed in visit on 07/15/21   UA reflex to Microscopic and Culture     Status: Abnormal    Specimen: Urine, Clean Catch   Result Value Ref Range    Color Urine Light Yellow Colorless, Straw, Light Yellow, Yellow    Appearance Urine Clear Clear    Glucose Urine Negative Negative mg/dL    Bilirubin Urine Negative Negative    Ketones Urine Negative Negative mg/dL    Specific Gravity Urine 1.010 1.000 - 1.030    Blood Urine Small (A) Negative    pH Urine 6.0 5.0 - 9.0    Protein Albumin Urine Negative Negative mg/dL    Urobilinogen Urine Normal Normal, 2.0 mg/dL    Nitrite Urine Negative Negative    Leukocyte Esterase Urine Moderate (A) Negative    Bacteria Urine Few (A) None Seen /HPF    Mucus Urine Present (A) None Seen /LPF    RBC Urine 12 (H) <=2 /HPF    WBC Urine 37 (H) <=5 /HPF    Squamous Epithelials Urine 3 (H) <=1 /HPF    Narrative    Urine Culture ordered based on laboratory criteria             ASSESSMENT/PLAN:    I have reviewed the nursing notes.  I have reviewed the findings, diagnosis, plan and need for follow up with the patient.    1. UTI symptoms    - UA reflex to Microscopic and Culture  - Urine Culture    - phenazopyridine (PYRIDIUM) 200 MG tablet; Take 1 tablet (200 mg) by mouth 3 times daily as needed for irritation  Dispense: 10 tablet; Refill: 0    2. Acute cystitis without hematuria    - nitroFURantoin macrocrystal-monohydrate (MACROBID) 100 MG capsule; Take 1 capsule (100 mg) by mouth 2 times daily for 5 days  Dispense: 10 capsule; Refill: 0      Urinalysis - light yellow, clear, small blood, negative nitrite, moderate  leukocyte estrace  Urine microscopic - RBC 12, WBC 37, few bacteria     Urine culture pending  Will call if culture warrants change of abx.     Encouraged fluids and frequent bladder emptying.    Discussed warning signs/symptoms indicative of need to f/u    Follow up if symptoms persist or worsen or concerns      I explained my diagnostic considerations and recommendations to the patient, who voiced understanding and agreement with the treatment plan. All questions were answered. We discussed potential side effects of any prescribed or recommended therapies, as well as expectations for response to treatments.

## 2021-07-16 NOTE — TELEPHONE ENCOUNTER
Sent [employer] message back.  Nolvia Lambert CMA([facility])..................7/16/2021   12:58 PM

## 2021-07-16 NOTE — TELEPHONE ENCOUNTER
Patient is calling, requesting someone look at this note. She put it in on 07/06/21 and states has not heard from anyone.  Please call      Erin Mtz on 7/16/2021 at 12:17 PM

## 2021-07-17 LAB — BACTERIA UR CULT: ABNORMAL

## 2021-07-20 ENCOUNTER — E-VISIT (OUTPATIENT)
Dept: INTERNAL MEDICINE | Facility: OTHER | Age: 66
End: 2021-07-20
Attending: NURSE PRACTITIONER
Payer: COMMERCIAL

## 2021-07-20 DIAGNOSIS — M54.41 ACUTE RIGHT-SIDED LOW BACK PAIN WITH RIGHT-SIDED SCIATICA: Primary | ICD-10-CM

## 2021-07-20 DIAGNOSIS — M54.41 ACUTE BACK PAIN WITH SCIATICA, RIGHT: ICD-10-CM

## 2021-07-20 DIAGNOSIS — R29.898 WEAKNESS OF RIGHT LEG: ICD-10-CM

## 2021-07-20 DIAGNOSIS — M53.3 SI (SACROILIAC) JOINT DYSFUNCTION: ICD-10-CM

## 2021-07-20 PROCEDURE — 99422 OL DIG E/M SVC 11-20 MIN: CPT | Performed by: NURSE PRACTITIONER

## 2021-07-20 NOTE — TELEPHONE ENCOUNTER
Shirley Hollins  : 1955 Age: 65 year old Sex: female MRN: [id number]    CC:   Chief Complaint   Patient presents with               HPI:   Shirley is a 65-year-old female patient who is being evaluated today for updates regarding her right-sided lower back pelvis pain which radiates down into her right lateral leg.  She has been receiving scheduled injections for her right SI joint pain which offer her relief but her insurance requires a visit every three months.     Today she reports ongoing pain rated 6 out of 10 which has continually worsened.  Pain is reported as a deep burning in her central right buttock and radiates around her right hip down into the lateral aspect of her right thigh. She continues to do yoga daily, and follows a daily exercise program given to her by physical therapy.  She has completed physical therapy from 2020 until .  She has been doing home exercises per physical therapy from 2020 to present.    Imaging was completed of her lumbar spine 2020.  At that time she was referred to physical therapy for evaluation and treatment.    * X-ray of her pelvis was completed 2020.  * X-ray of her SI joints was also completed 2020.  * MRI of lumbar spine was completed 2020.  * She had an office visit 2020 at which time SI therapeutic injections were ordered.  * She received her first SI therapeutic joint injection on 2020 and experienced greater than 75% pain relief and increase in function for approximately 4 weeks.    * She received her second therapeutic SI joint injection on 2020 and reports she experienced greater than 75% pain relief and increase in function for approximately 6 weeks.    * She had scheduled therapeutic SI joint injection #3 on 2021 and was denied.  * On 3/30/21 she finally received her injection in the Right SI joint.      Today, we discussed that she  should be evaluated by the neurosurgeon here at [facility], Dr. Messi Maradiaga. A referral was placed for her to be evaluated by him to determine a plan of treatment. He does require a current MRI within 6 months so that was also ordered today; in addition to her SI injection to manage her pain until then.    ASSESSMENT AND PLAN:    Bilateral low back pain with right-sided sciatica, unspecified chronicity  Patient will continue her daily yoga and daily home exercise program per physical therapy recommendations.  We will attempt to schedule her for her fourth SI therapeutic joint injection to see if that offers her continued relief since she has received greater than 75% relief of pain with prior to injections.    Weakness of right leg  This seems to be improving.  Patient reports she no longer has below the knee weakness; it is in her upper right thigh only.    Osteopenia of lumbar spine  Continue vitamin D therapy.  Continue adequate calcium intake through dietary intake.    SI (sacroiliac) joint dysfunction  Patient will continue her daily yoga and daily home exercise program per physical therapy recommendations.  We will attempt to schedule her for her fourth SI therapeutic joint injection to see if that offers her continued relief since she has received greater than 75% relief of pain with prior to injections.     DAR Gold, AGNP-C  Internal Medicine  07/20/2021 9:39 AM    I explained my diagnostic considerations and recommendations to the patient, who voiced understanding and agreement with the treatment plan. All questions were answered. We discussed potential side effects of any prescribed or recommended therapies, as well as expectations for response to treatments.     Return in about 3 months (around 10/20/2021) for Recheck for SI injection.         SUBJECTIVE:                                                      RAH Score:    RAH-7 SCORE 11/3/2017 11/6/2018 11/19/2020   Total Score 1 0 1       PHQ-2 Score:     PHQ-2 ( 1999 Pfizer) 7/15/2021 3/12/2021   Q1: Little interest or pleasure in doing things 0 0   Q2: Feeling down, depressed or hopeless 0 0   PHQ-2 Score 0 0      Problem List/PMH: Reviewed in EMR, and made relevant updates today.  Medications: Reviewed in EMR, and made relevant updates today.  Allergies: Reviewed in EMR, and made relevant updates today.    Current Code Status:  No Order    REVIEW OF SYSTEMS:    Review of Systems   Constitutional: Positive for activity change (Due to increased pain in right buttock hip and leg).   Musculoskeletal: Positive for back pain (Right-sided low back sacral), gait problem (Increased pain with ambulation on right side hip and leg) and myalgias (Right thigh).   All other systems reviewed and are negative.      OBJECTIVE:                                                      EXAM:     There were no vitals taken for this visit.      Physical Exam  Unable to assess due to virtual/telephone visit    Diagnostics Completed at this Visit:    No results found for any visits on 07/20/21.       Total time spent with this patient was 15 minutes which included chart review, visualization and interpretation of labs and/or images, time spent with patient, and documentation.Provider E-Visit time total (minutes): 10

## 2021-07-27 ENCOUNTER — HOSPITAL ENCOUNTER (OUTPATIENT)
Dept: MRI IMAGING | Facility: OTHER | Age: 66
Discharge: HOME | End: 2021-07-27
Attending: NURSE PRACTITIONER | Admitting: NURSE PRACTITIONER
Payer: COMMERCIAL

## 2021-07-27 DIAGNOSIS — M53.3 SI (SACROILIAC) JOINT DYSFUNCTION: ICD-10-CM

## 2021-07-27 DIAGNOSIS — M54.41 ACUTE BACK PAIN WITH SCIATICA, RIGHT: ICD-10-CM

## 2021-07-27 DIAGNOSIS — M54.41 ACUTE RIGHT-SIDED LOW BACK PAIN WITH RIGHT-SIDED SCIATICA: ICD-10-CM

## 2021-07-27 DIAGNOSIS — R29.898 WEAKNESS OF RIGHT LEG: ICD-10-CM

## 2021-07-27 PROCEDURE — 72148 MRI LUMBAR SPINE W/O DYE: CPT

## 2021-08-06 ENCOUNTER — HOSPITAL ENCOUNTER (OUTPATIENT)
Dept: GENERAL RADIOLOGY | Facility: OTHER | Age: 66
Discharge: HOME | End: 2021-08-06
Attending: NURSE PRACTITIONER | Admitting: NURSE PRACTITIONER
Payer: COMMERCIAL

## 2021-08-06 DIAGNOSIS — M54.41 ACUTE BACK PAIN WITH SCIATICA, RIGHT: ICD-10-CM

## 2021-08-06 DIAGNOSIS — M54.41 ACUTE RIGHT-SIDED LOW BACK PAIN WITH RIGHT-SIDED SCIATICA: ICD-10-CM

## 2021-08-06 DIAGNOSIS — M53.3 SI (SACROILIAC) JOINT DYSFUNCTION: ICD-10-CM

## 2021-08-06 DIAGNOSIS — R29.898 WEAKNESS OF RIGHT LEG: ICD-10-CM

## 2021-08-06 PROCEDURE — 255N000002 HC RX 255 OP 636: Performed by: RADIOLOGY

## 2021-08-06 PROCEDURE — 27096 INJECT SACROILIAC JOINT: CPT | Mod: RT

## 2021-08-06 PROCEDURE — 250N000009 HC RX 250: Performed by: RADIOLOGY

## 2021-08-06 PROCEDURE — 250N000011 HC RX IP 250 OP 636: Performed by: RADIOLOGY

## 2021-08-06 RX ORDER — TRIAMCINOLONE ACETONIDE 40 MG/ML
40 INJECTION, SUSPENSION INTRA-ARTICULAR; INTRAMUSCULAR ONCE
Status: COMPLETED | OUTPATIENT
Start: 2021-08-06 | End: 2021-08-06

## 2021-08-06 RX ORDER — BUPIVACAINE HYDROCHLORIDE 5 MG/ML
3 INJECTION, SOLUTION EPIDURAL; INTRACAUDAL ONCE
Status: COMPLETED | OUTPATIENT
Start: 2021-08-06 | End: 2021-08-06

## 2021-08-06 RX ORDER — LIDOCAINE HYDROCHLORIDE 10 MG/ML
2 INJECTION, SOLUTION INFILTRATION; PERINEURAL ONCE
Status: COMPLETED | OUTPATIENT
Start: 2021-08-06 | End: 2021-08-06

## 2021-08-06 RX ADMIN — BUPIVACAINE HYDROCHLORIDE 3 ML: 5 INJECTION, SOLUTION EPIDURAL; INTRACAUDAL at 13:47

## 2021-08-06 RX ADMIN — TRIAMCINOLONE ACETONIDE 40 MG: 40 INJECTION, SUSPENSION INTRA-ARTICULAR; INTRAMUSCULAR at 13:47

## 2021-08-06 RX ADMIN — IOHEXOL 2 ML: 240 INJECTION, SOLUTION INTRATHECAL; INTRAVASCULAR; INTRAVENOUS; ORAL at 13:47

## 2021-08-06 RX ADMIN — LIDOCAINE HYDROCHLORIDE 2 ML: 10 INJECTION, SOLUTION INFILTRATION; PERINEURAL at 13:47

## 2021-08-29 DIAGNOSIS — F41.9 ANXIETY: ICD-10-CM

## 2021-08-30 DIAGNOSIS — M53.3 SI (SACROILIAC) JOINT DYSFUNCTION: Primary | ICD-10-CM

## 2021-08-31 RX ORDER — CLONAZEPAM 0.5 MG/1
TABLET ORAL
Qty: 60 TABLET | Refills: 0 | Status: SHIPPED | OUTPATIENT
Start: 2021-08-31 | End: 2021-11-23

## 2021-08-31 NOTE — TELEPHONE ENCOUNTER
[id number]  sent Rx request for the following:      Requested Prescriptions   Pending Prescriptions Disp Refills     clonazePAM (KLONOPIN) 0.5 MG tablet [Pharmacy Med Name: clonazePAM 0.5 MG Oral Tablet] 60 tablet 0     Sig: Take 1 tablet by mouth twice daily as needed for anxiety       There is no refill protocol information for this order        Last Prescription Date:   04/05/2021  Last Fill Qty/Refills:         60, R-1  Last Office Visit:              07/15/21  Future Office visit:           None  Routing refill request to provider for review/approval because:  Drug not on the [facility], [facility] or [facility] refill protocol or controlled substance    Unable to complete prescription refill per RN Medication Refill Policy. Rosa Steinberg RN .............. 8/31/2021  2:36 PM

## 2021-09-02 ENCOUNTER — HOSPITAL ENCOUNTER (OUTPATIENT)
Dept: GENERAL RADIOLOGY | Facility: OTHER | Age: 66
End: 2021-09-02
Attending: STUDENT IN AN ORGANIZED HEALTH CARE EDUCATION/TRAINING PROGRAM
Payer: COMMERCIAL

## 2021-09-02 ENCOUNTER — OFFICE VISIT (OUTPATIENT)
Dept: ORTHOPEDICS | Facility: OTHER | Age: 66
End: 2021-09-02
Attending: NURSE PRACTITIONER
Payer: COMMERCIAL

## 2021-09-02 VITALS — WEIGHT: 138 LBS | BODY MASS INDEX: 22.99 KG/M2 | HEIGHT: 65 IN

## 2021-09-02 DIAGNOSIS — M54.41 ACUTE RIGHT-SIDED LOW BACK PAIN WITH RIGHT-SIDED SCIATICA: ICD-10-CM

## 2021-09-02 DIAGNOSIS — M54.41 ACUTE BACK PAIN WITH SCIATICA, RIGHT: ICD-10-CM

## 2021-09-02 DIAGNOSIS — R29.898 WEAKNESS OF RIGHT LEG: ICD-10-CM

## 2021-09-02 DIAGNOSIS — M53.3 SI (SACROILIAC) JOINT DYSFUNCTION: ICD-10-CM

## 2021-09-02 PROCEDURE — 99214 OFFICE O/P EST MOD 30 MIN: CPT | Performed by: STUDENT IN AN ORGANIZED HEALTH CARE EDUCATION/TRAINING PROGRAM

## 2021-09-02 PROCEDURE — 72170 X-RAY EXAM OF PELVIS: CPT

## 2021-09-02 ASSESSMENT — MIFFLIN-ST. JEOR: SCORE: 1181.84

## 2021-09-02 ASSESSMENT — PAIN SCALES - GENERAL: PAINLEVEL: SEVERE PAIN (7)

## 2021-09-02 NOTE — NURSING NOTE
"Chief Complaint   Patient presents with     Consult     Right Sided Low Back Pain      Patient presents to the clinic today for right sided low back pain.     Initial Ht 1.651 m (5' 5\")   Wt 62.6 kg (138 lb)   BMI 22.96 kg/m   Estimated body mass index is 22.96 kg/m  as calculated from the following:    Height as of this encounter: 1.651 m (5' 5\").    Weight as of this encounter: 62.6 kg (138 lb).  Medication Reconciliation: complete    Mignon Braden LPN  "

## 2021-09-02 NOTE — PROGRESS NOTES
Visit Date: 09/02/2021    HISTORY OF PRESENT ILLNESS:  Shirley is a 65-year-old female who presents for evaluation of right sided SI joint dysfunction.  She is able to localize her pain to the posterior superior iliac spine via the Nakul finger test.  His pain has been progressive and debilitating over at least greater than 1 year, of which she has done greater than 6 months of conservative therapy involving SI injections, home exercise and pain medication management.  She has done greater than 6 weeks of physical therapy as well and has now down a total of 4 right sided SI joint injections, which each time was given her greater than 80% pain relief through the anesthetic window for over 2 weeks, but unfortunately her pain, is just progressive and becoming more debilitating to her lifestyle.  She is interested in additional more permanent surgical intervention at this time.    PHYSICAL EXAMINATION:    GENERAL:  Well-appearing, well-nourished.  NEUROLOGIC:  She has 5/5 strength in bilateral lower extremity including hip flexion, knee extension, flexion, ankle dorsiflexion, plantarflexion, and EHL.  She has normal sensation to light touch throughout bilateral upper and lower extremities.  She has tender to palpation right PSIS and is positive for 5/5 provocative SI joint testing procedures including JEREMIAH, Gaenslen's, thigh thrust, distraction and compression.    IMAGING:  Available for review today includes an MRI of the lumbar spine, which shows very mild degenerative changes with no significant spinal canal neural foraminal narrowing and no significant facet arthropathy.  She has an AP pelvis x-ray, which shows right greater than left sided SI joint degeneration.  No intrinsic pelvic ring pathology and no evidence of significant hip osteoarthritis.    ASSESSMENT AND PLAN:  Shirley is a 65-year-old female with right sided SI joint degeneration and dysfunction with greater than 80% pain relief through the anesthetic  window on 4 different right sided SI joint injections, which has been unfortunately her pain has been refractory to PT and conservative measures for greater than 6 months Risks, benefits and alternatives discussed with the patient.  She has elected to undergo a right sided minimally invasive SI joint fusion surgery to be performed at [facility] once we can get insurance authorization.    This clinic visit took 30-44 minutes.    Messi Maradiaga MD        D: 2021   T: 2021   MT: radha    Name:     STEPH SNEED  MRN:      [id number]        Account:    [id number]   :      1955           Visit Date: 2021     Document: [id number]

## 2021-10-04 ENCOUNTER — HEALTH MAINTENANCE LETTER (OUTPATIENT)
Age: 66
End: 2021-10-04

## 2021-10-19 ENCOUNTER — OFFICE VISIT (OUTPATIENT)
Dept: INTERNAL MEDICINE | Facility: OTHER | Age: 66
End: 2021-10-19
Attending: NURSE PRACTITIONER
Payer: COMMERCIAL

## 2021-10-19 VITALS
TEMPERATURE: 97.8 F | DIASTOLIC BLOOD PRESSURE: 74 MMHG | OXYGEN SATURATION: 100 % | RESPIRATION RATE: 16 BRPM | BODY MASS INDEX: 23.86 KG/M2 | SYSTOLIC BLOOD PRESSURE: 126 MMHG | WEIGHT: 143.4 LBS | HEART RATE: 64 BPM

## 2021-10-19 DIAGNOSIS — R29.898 WEAKNESS OF RIGHT LEG: Chronic | ICD-10-CM

## 2021-10-19 DIAGNOSIS — M54.41 BILATERAL LOW BACK PAIN WITH RIGHT-SIDED SCIATICA, UNSPECIFIED CHRONICITY: Chronic | ICD-10-CM

## 2021-10-19 DIAGNOSIS — Z01.818 PREOP GENERAL PHYSICAL EXAM: Primary | ICD-10-CM

## 2021-10-19 LAB
ALBUMIN SERPL-MCNC: 4.6 G/DL (ref 3.5–5.7)
ALP SERPL-CCNC: 64 U/L (ref 34–104)
ALT SERPL W P-5'-P-CCNC: 18 U/L (ref 7–52)
ANION GAP SERPL CALCULATED.3IONS-SCNC: 7 MMOL/L (ref 3–14)
AST SERPL W P-5'-P-CCNC: 18 U/L (ref 13–39)
BASOPHILS # BLD AUTO: 0 10E3/UL (ref 0–0.2)
BASOPHILS NFR BLD AUTO: 1 %
BILIRUB SERPL-MCNC: 0.7 MG/DL (ref 0.3–1)
BUN SERPL-MCNC: 15 MG/DL (ref 7–25)
CALCIUM SERPL-MCNC: 9.9 MG/DL (ref 8.6–10.3)
CHLORIDE BLD-SCNC: 101 MMOL/L (ref 98–107)
CO2 SERPL-SCNC: 30 MMOL/L (ref 21–31)
CREAT SERPL-MCNC: 0.64 MG/DL (ref 0.6–1.2)
EOSINOPHIL # BLD AUTO: 0.1 10E3/UL (ref 0–0.7)
EOSINOPHIL NFR BLD AUTO: 1 %
ERYTHROCYTE [DISTWIDTH] IN BLOOD BY AUTOMATED COUNT: 12.7 % (ref 10–15)
GFR SERPL CREATININE-BSD FRML MDRD: >90 ML/MIN/1.73M2
GLUCOSE BLD-MCNC: 94 MG/DL (ref 70–105)
HCT VFR BLD AUTO: 42.1 % (ref 35–47)
HGB BLD-MCNC: 14.3 G/DL (ref 11.7–15.7)
IMM GRANULOCYTES # BLD: 0 10E3/UL
IMM GRANULOCYTES NFR BLD: 0 %
LYMPHOCYTES # BLD AUTO: 1.6 10E3/UL (ref 0.8–5.3)
LYMPHOCYTES NFR BLD AUTO: 42 %
MCH RBC QN AUTO: 31.9 PG (ref 26.5–33)
MCHC RBC AUTO-ENTMCNC: 34 G/DL (ref 31.5–36.5)
MCV RBC AUTO: 94 FL (ref 78–100)
MONOCYTES # BLD AUTO: 0.3 10E3/UL (ref 0–1.3)
MONOCYTES NFR BLD AUTO: 9 %
NEUTROPHILS # BLD AUTO: 1.7 10E3/UL (ref 1.6–8.3)
NEUTROPHILS NFR BLD AUTO: 47 %
NRBC # BLD AUTO: 0 10E3/UL
NRBC BLD AUTO-RTO: 0 /100
PLATELET # BLD AUTO: 264 10E3/UL (ref 150–450)
POTASSIUM BLD-SCNC: 4.3 MMOL/L (ref 3.5–5.1)
PROT SERPL-MCNC: 6.8 G/DL (ref 6.4–8.9)
RBC # BLD AUTO: 4.48 10E6/UL (ref 3.8–5.2)
SODIUM SERPL-SCNC: 138 MMOL/L (ref 134–144)
WBC # BLD AUTO: 3.8 10E3/UL (ref 4–11)

## 2021-10-19 PROCEDURE — 82040 ASSAY OF SERUM ALBUMIN: CPT | Mod: ZL | Performed by: NURSE PRACTITIONER

## 2021-10-19 PROCEDURE — 85025 COMPLETE CBC W/AUTO DIFF WBC: CPT | Mod: ZL | Performed by: NURSE PRACTITIONER

## 2021-10-19 PROCEDURE — 36415 COLL VENOUS BLD VENIPUNCTURE: CPT | Mod: ZL | Performed by: NURSE PRACTITIONER

## 2021-10-19 PROCEDURE — 93000 ELECTROCARDIOGRAM COMPLETE: CPT | Performed by: INTERNAL MEDICINE

## 2021-10-19 PROCEDURE — 99215 OFFICE O/P EST HI 40 MIN: CPT | Performed by: NURSE PRACTITIONER

## 2021-10-19 ASSESSMENT — PAIN SCALES - GENERAL: PAINLEVEL: SEVERE PAIN (6)

## 2021-10-19 NOTE — NURSING NOTE
"Chief Complaint   Patient presents with     Pre-Op Exam     R Sacriliac joint fusionm, 11/2/21, Dr. Maradiaga[facility] [place], [place]       Initial /74 (BP Location: Right arm, Patient Position: Sitting, Cuff Size: Adult Regular)   Pulse 64   Temp 97.8  F (36.6  C) (Tympanic)   Resp 16   Wt 65 kg (143 lb 6.4 oz)   LMP  (LMP Unknown)   SpO2 100%   Breastfeeding No   BMI 23.86 kg/m   Estimated body mass index is 23.86 kg/m  as calculated from the following:    Height as of 9/2/21: 1.651 m (5' 5\").    Weight as of this encounter: 65 kg (143 lb 6.4 oz).  Medication Reconciliation: complete  FOOD SECURITY SCREENING QUESTIONS  Hunger Vital Signs:  Within the past 12 months we worried whether our food would run out before we got money to buy more. Never  Within the past 12 months the food we bought just didn't last and we didn't have money to get more. Never    Advance care plan reviewed      Kimberlyn Escobar LPN    "

## 2021-10-19 NOTE — PROGRESS NOTES
[facility]  [address]  [place] [address]  Phone: [phone]  Primary Provider: Sonia Mitchell  Pre-op Performing Provider: SAMUEL GALLO    PREOPERATIVE EVALUATION:  Today's date: 10/19/2021    Shirley Hollins is a 65 year old female who presents for a preoperative evaluation.    Surgical Information:  Surgery/Procedure:  Right sided minimally invasive SI joint fusion surgery   Surgery Location:  [facility]  Surgeon:   Dr. Messi Maradiaga  Surgery Date:   11/2/21  Time of Surgery:  TBD  Where patient plans to recover: At home with family  Fax number for surgical facility:  [phone]    Type of Anesthesia Anticipated: to be determined    Assessment & Plan     The proposed surgical procedure is considered INTERMEDIATE risk.    Bilateral low back pain with right-sided sciatica, unspecified chronicity  Weakness of right leg  Preop general physical exam  - EKG 12-lead ([facility] and [facility] Only)  - CBC with Platelets & Differential ([facility] Only)  - Comprehensive Metabolic Panel    Risks and Recommendations:  The patient has the following additional risks and recommendations for perioperative complications:   - No identified additional risk factors other than previously addressed    Medication Instructions:  Patient is to take all scheduled medications on the day of surgery EXCEPT for modifications listed below:   - ibuprofen (Advil, Motrin): HOLD 1 day before surgery.    - naproxen (Aleve, Naprosyn): HOLD 4 days before surgery.     RECOMMENDATION:  APPROVAL GIVEN to proceed with proposed procedure, without further diagnostic evaluation.    47 minutes spent on the date of the encounter doing chart review, history and exam, documentation and further activities per the note    Subjective     HPI related to upcoming procedure: history of right sided SI joint dysfunction.  She is able to localize her pain to the posterior superior iliac spine via  the Nakul finger test.  His pain has been progressive and debilitating over at least greater than 1 year, of which she has done greater than 6 months of conservative therapy involving SI injections, home exercise and pain medication management.  She has done greater than 6 weeks of physical therapy as well and has now down a total of 4 right sided SI joint injections, which each time was given her greater than 80% pain relief through the anesthetic window for over 2 weeks, but unfortunately her pain, is just progressive and becoming more debilitating to her lifestyle.      Preop Questions 10/19/2021   1. Have you ever had a heart attack or stroke? No   2. Have you ever had surgery on your heart or blood vessels, such as a stent placement, a coronary artery bypass, or surgery on an artery in your head, neck, heart, or legs? No   3. Do you have chest pain with activity? No   4. Do you have a history of  heart failure? No   5. Do you currently have a cold, bronchitis or symptoms of other infection? No   6. Do you have a cough, shortness of breath, or wheezing? No   7. Do you or anyone in your family have previous history of blood clots? No   8. Do you or does anyone in your family have a serious bleeding problem such as prolonged bleeding following surgeries or cuts? No   9. Have you ever had problems with anemia or been told to take iron pills? No   10. Have you had any abnormal blood loss such as black, tarry or bloody stools, or abnormal vaginal bleeding? No   11. Have you ever had a blood transfusion? UNKNOWN   12. Are you willing to have a blood transfusion if it is medically needed before, during, or after your surgery? Yes   13. Have you or any of your relatives ever had problems with anesthesia? No   14. Do you have sleep apnea, excessive snoring or daytime drowsiness? No   15. Do you have any artifical heart valves or other implanted medical devices like a pacemaker, defibrillator, or continuous glucose  monitor? No   16. Do you have artificial joints? No   17. Are you allergic to latex? No      Health Care Directive:  Patient does not have a Health Care Directive or Living Will: Discussed advance care planning with patient; information given to patient to review.    Preoperative Review of :   reviewed - no record of controlled substances prescribed.    Status of Chronic Conditions:  See problem list for active medical problems.  Problems all longstanding and stable, except as noted/documented.  See ROS for pertinent symptoms related to these conditions.    Review of Systems  Constitutional, neuro, ENT, endocrine, pulmonary, cardiac, gastrointestinal, genitourinary, musculoskeletal, integument and psychiatric systems are negative, except as otherwise noted.    Patient Active Problem List    Diagnosis Date Noted     SI (sacroiliac) joint dysfunction 10/16/2020     Priority: Medium     Bilateral low back pain with right-sided sciatica, unspecified chronicity 10/16/2020     Priority: Medium     Weakness of right leg 10/16/2020     Priority: Medium     Osteopenia of lumbar spine 11/06/2018     Priority: Medium     Esophageal reflux 02/20/2018     Priority: Medium     H/O adenomatous polyp of colon 02/20/2018     Priority: Medium     Overview:   Tubular adenoma with low grade dysplasia at 8 cm. noted on colonoscopy       Hyperlipidemia 02/20/2018     Priority: Medium     Hypothyroidism 02/20/2018     Priority: Medium     Health care maintenance 10/02/2016     Priority: Medium     Overview:   Colonoscopy: Done in 2013 and normal other than diverticular disease, repeat  2023  Breast Exam/Mammography: Ordered 09/2016  Pap smear: Never any abnormal, no longer performing due to hysterectomy  DEXA: Age 67 or as indicated sooner  Immunizations: UTD at this time  Lipids/Annual Exam: Done in 09/2016 and mildly elevated.  Monitor  Hepatitis C screen: NR in 2015       Panic anxiety syndrome 10/02/2016     Priority: Medium       Past Medical History:   Diagnosis Date     Adenomatous colon polyp      Basal cell carcinoma 2020    lip     Benign lipomatous neoplasm of skin and subcutaneous tissue of extremity 06/15/2017     Diverticulosis of intestine without perforation or abscess without bleeding      Ectopic pregnancy without intrauterine pregnancy      Gastro-esophageal reflux disease without esophagitis      Hyperlipidemia      Hypothyroidism      Nonspecific reaction to tuberculin skin test without active tuberculosis     ,Normal CXR; treated with INH, incomplete treatment but mostly finished     Other bursal cyst, left hip 2016     Other complicated headache syndrome     ,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 Large inflamed synovial cyst and bursa. [facility], Dr. Epperson     COLONOSCOPY      WNL     COLONOSCOPY      Adenomatous polyp     ESOPHAGOSCOPY, GASTROSCOPY, DUODENOSCOPY (EGD), COMBINED      ,gastritis and esophagitis; normal     HYSTERECTOMY TOTAL ABDOMINAL      with BSO (one ovary at a time)     LAPAROSCOPIC CHOLECYSTECTOMY       OOPHORECTOMY Right      TONSILLECTOMY, ADENOIDECTOMY, COMBINED       Current Outpatient Medications   Medication Sig Dispense Refill     cholecalciferol (VITAMIN D3) 1000 UNIT tablet Take 1 tablet (1,000 Units) by mouth daily 100 tablet 3     clonazePAM (KLONOPIN) 0.5 MG tablet Take 1 tablet by mouth twice daily as needed for anxiety 60 tablet 0     famotidine (PEPCID) 40 MG tablet Take 40 mg by mouth daily       ibuprofen (ADVIL/MOTRIN) 800 MG tablet TAKE 1 TABLET BY MOUTH EVERY 8 HOURS AS NEEDED FOR MODERATE PAIN 60  tablet 1     levothyroxine (SYNTHROID/LEVOTHROID) 112 MCG tablet Take 1 tablet (112 mcg) by mouth daily 90 tablet 3     NEW  mcg CBD Oil nightly as needed for sleep       phenazopyridine (PYRIDIUM) 200 MG tablet Take 1 tablet (200 mg) by mouth 3 times daily as needed for irritation 10 tablet 0     Allergies   Allergen Reactions     Trazodone      Other reaction(s): Headache, Hypertension  RCVS        Social History     Tobacco Use     Smoking status: Former Smoker     Packs/day: 0.15     Years: 20.00     Pack years: 3.00     Types: Cigarettes     Quit date: 2005     Years since quittin.8     Smokeless tobacco: Never Used   Substance Use Topics     Alcohol use: Yes     Comment: Occasional     Family History   Problem Relation Age of Onset     Other - See Comments Mother         Cirrhosis, secondary to CASTILLO     Diabetes Mother      Cancer Father         Step father, cancer of esophagus     Cancer Other         Cancer,Lung cancer, both smokers     Thyroid Disease Maternal Grandmother      Cancer Maternal Aunt         Cancer,Pancreatic cancer     Breast Cancer No family hx of         Cancer-breast     History   Drug Use No     Comment: Drug use: No IV drug use       Objective     /74 (BP Location: Right arm, Patient Position: Sitting, Cuff Size: Adult Regular)   Pulse 64   Temp 97.8  F (36.6  C) (Tympanic)   Resp 16   Wt 65 kg (143 lb 6.4 oz)   LMP  (LMP Unknown)   SpO2 100%   Breastfeeding No   BMI 23.86 kg/m      Physical Exam     GENERAL APPEARANCE: healthy, alert and no distress     EYES: EOMI, PERRL     HENT: ear canals and TM's normal and nose and mouth without ulcers or lesions     NECK: no adenopathy, no asymmetry, masses, or scars and thyroid normal to palpation     RESP: lungs clear to auscultation - no rales, rhonchi or wheezes     CV: regular rates and rhythm, normal S1 S2, no S3 or S4 and no murmur, click or rub     ABDOMEN:  soft, nontender, no HSM or masses and bowel sounds  normal     MS: extremities normal - no gross deformities noted, no evidence of inflammation in joints, FROM in all extremities.     SKIN: no suspicious lesions or rashes     NEURO: Normal strength and tone, sensory exam grossly normal, mentation intact and speech normal     PSYCH: mentation appears normal. and affect normal/bright    Recent Labs   Lab Test 11/19/20  0858 01/31/20  1336 12/30/19  1003 11/18/19  1051   HGB 14.2 14.5   < > 13.9    305   < > 276     --   --  137   POTASSIUM 4.1  --   --  4.0   CR 0.74  --   --  0.69   A1C 5.5  --   --   --     < > = values in this interval not displayed.      Diagnostics:  Results for orders placed or performed in visit on 10/19/21   Comprehensive Metabolic Panel     Status: Normal   Result Value Ref Range    Sodium 138 134 - 144 mmol/L    Potassium 4.3 3.5 - 5.1 mmol/L    Chloride 101 98 - 107 mmol/L    Carbon Dioxide (CO2) 30 21 - 31 mmol/L    Anion Gap 7 3 - 14 mmol/L    Urea Nitrogen 15 7 - 25 mg/dL    Creatinine 0.64 0.60 - 1.20 mg/dL    Calcium 9.9 8.6 - 10.3 mg/dL    Glucose 94 70 - 105 mg/dL    Alkaline Phosphatase 64 34 - 104 U/L    AST 18 13 - 39 U/L    ALT 18 7 - 52 U/L    Protein Total 6.8 6.4 - 8.9 g/dL    Albumin 4.6 3.5 - 5.7 g/dL    Bilirubin Total 0.7 0.3 - 1.0 mg/dL    GFR Estimate >90 >60 mL/min/1.73m2   CBC with platelets and differential     Status: Abnormal   Result Value Ref Range    WBC Count 3.8 (L) 4.0 - 11.0 10e3/uL    RBC Count 4.48 3.80 - 5.20 10e6/uL    Hemoglobin 14.3 11.7 - 15.7 g/dL    Hematocrit 42.1 35.0 - 47.0 %    MCV 94 78 - 100 fL    MCH 31.9 26.5 - 33.0 pg    MCHC 34.0 31.5 - 36.5 g/dL    RDW 12.7 10.0 - 15.0 %    Platelet Count 264 150 - 450 10e3/uL    % Neutrophils 47 %    % Lymphocytes 42 %    % Monocytes 9 %    % Eosinophils 1 %    % Basophils 1 %    % Immature Granulocytes 0 %    NRBCs per 100 WBC 0 <1 /100    Absolute Neutrophils 1.7 1.6 - 8.3 10e3/uL    Absolute Lymphocytes 1.6 0.8 - 5.3 10e3/uL    Absolute  Monocytes 0.3 0.0 - 1.3 10e3/uL    Absolute Eosinophils 0.1 0.0 - 0.7 10e3/uL    Absolute Basophils 0.0 0.0 - 0.2 10e3/uL    Absolute Immature Granulocytes 0.0 <=0.0 10e3/uL    Absolute NRBCs 0.0 10e3/uL   EKG 12-lead ([facility] Only)     Status: None   Result Value Ref Range    Systolic Blood Pressure  mmHg    Diastolic Blood Pressure  mmHg    Ventricular Rate 60 BPM    Atrial Rate 60 BPM    TX Interval 170 ms    QRS Duration 80 ms     ms    QTc 418 ms    P Axis 37 degrees    R AXIS 49 degrees    T Axis 33 degrees    Interpretation ECG       Sinus rhythm  Normal ECG  No previous ECGs available  Confirmed by MD RODRIGUEZ DARIN ([id number]) on 10/20/2021 8:29:49 PM     CBC with Platelets & Differential ([facility] Only)     Status: Abnormal    Narrative    The following orders were created for panel order CBC with Platelets & Differential ([facility] Only).  Procedure                               Abnormality         Status                     ---------                               -----------         ------                     CBC with platelets and d...[[id number]]  Abnormal            Final result                 Please view results for these tests on the individual orders.       EKG: appears normal, NSR, unchanged from previous tracings    Revised Cardiac Risk Index (RCRI):  The patient has the following serious cardiovascular risks for perioperative complications:   - No serious cardiac risks = 0 points     RCRI Interpretation: 0 points: Class I (very low risk - 0.4% complication rate)    Signed Electronically by: Cherelle Dean NP  Copy of this evaluation report is provided to requesting physician.

## 2021-10-19 NOTE — PATIENT INSTRUCTIONS

## 2021-10-20 LAB
ATRIAL RATE - MUSE: 60 BPM
DIASTOLIC BLOOD PRESSURE - MUSE: NORMAL MMHG
INTERPRETATION ECG - MUSE: NORMAL
P AXIS - MUSE: 37 DEGREES
PR INTERVAL - MUSE: 170 MS
QRS DURATION - MUSE: 80 MS
QT - MUSE: 418 MS
QTC - MUSE: 418 MS
R AXIS - MUSE: 49 DEGREES
SYSTOLIC BLOOD PRESSURE - MUSE: NORMAL MMHG
T AXIS - MUSE: 33 DEGREES
VENTRICULAR RATE- MUSE: 60 BPM

## 2021-11-15 DIAGNOSIS — M53.3 SI (SACROILIAC) JOINT DYSFUNCTION: Primary | ICD-10-CM

## 2021-11-16 ENCOUNTER — HOSPITAL ENCOUNTER (OUTPATIENT)
Dept: GENERAL RADIOLOGY | Facility: OTHER | Age: 66
End: 2021-11-16
Attending: STUDENT IN AN ORGANIZED HEALTH CARE EDUCATION/TRAINING PROGRAM
Payer: COMMERCIAL

## 2021-11-16 ENCOUNTER — OFFICE VISIT (OUTPATIENT)
Dept: ORTHOPEDICS | Facility: OTHER | Age: 66
End: 2021-11-16
Attending: STUDENT IN AN ORGANIZED HEALTH CARE EDUCATION/TRAINING PROGRAM
Payer: COMMERCIAL

## 2021-11-16 DIAGNOSIS — M53.3 SI (SACROILIAC) JOINT DYSFUNCTION: Primary | ICD-10-CM

## 2021-11-16 DIAGNOSIS — M53.3 SI (SACROILIAC) JOINT DYSFUNCTION: ICD-10-CM

## 2021-11-16 PROCEDURE — 72170 X-RAY EXAM OF PELVIS: CPT

## 2021-11-16 PROCEDURE — 99212 OFFICE O/P EST SF 10 MIN: CPT | Performed by: STUDENT IN AN ORGANIZED HEALTH CARE EDUCATION/TRAINING PROGRAM

## 2021-11-16 NOTE — PROGRESS NOTES
Visit Date: 2021    HISTORY OF PRESENT ILLNESS:  Steph is a 65-year-old female 2 weeks out from right sided minimally invasive SI joint fusion surgery, doing well in the early postoperative period with improvement in her SI pain.  The expected amount of postoperative repair.  Incisional muscle pain.    PHYSICAL EXAMINATION:    GENERAL:  Well-appearing, well-nourished.  NEUROLOGIC:  She has 5/5 strength in bilateral lower extremity including hip flexion, knee extension, flexion, ankle dorsiflexion, plantarflexion, and EHL.  She has normal sensation to light touch throughout bilateral lower extremities.    IMAGING:  Available for review today includes an AP pelvis x-ray, which shows adequate positioning of her hardware.  No evidence of early hardware failure.    ASSESSMENT AND PLAN:  Steph is a 65-year-old female two weeks out from right sided minimally invasive SI joint fusion surgery, doing well in the early postoperative period.  She will begin physical therapy at any time at the [facility], and return to clinic in approximately 1 month for routine evaluation.    This clinic visit took 15-29 minutes.    Messi Maradiaga MD        D: 2021   T: 2021   MT: ESTEVAN    Name:     STEPH SNEED  MRN:      [id number]        Account:    [id number]   :      1955           Visit Date: 2021     Document: [id number]

## 2021-11-22 DIAGNOSIS — M53.3 SI (SACROILIAC) JOINT DYSFUNCTION: Primary | ICD-10-CM

## 2021-11-23 ENCOUNTER — HOSPITAL ENCOUNTER (OUTPATIENT)
Dept: MAMMOGRAPHY | Facility: OTHER | Age: 66
End: 2021-11-23
Attending: INTERNAL MEDICINE
Payer: COMMERCIAL

## 2021-11-23 ENCOUNTER — OFFICE VISIT (OUTPATIENT)
Dept: INTERNAL MEDICINE | Facility: OTHER | Age: 66
End: 2021-11-23
Attending: NURSE PRACTITIONER
Payer: COMMERCIAL

## 2021-11-23 VITALS
WEIGHT: 145 LBS | RESPIRATION RATE: 16 BRPM | DIASTOLIC BLOOD PRESSURE: 72 MMHG | BODY MASS INDEX: 24.13 KG/M2 | HEART RATE: 80 BPM | TEMPERATURE: 98.5 F | SYSTOLIC BLOOD PRESSURE: 122 MMHG | OXYGEN SATURATION: 100 %

## 2021-11-23 DIAGNOSIS — Z23 NEED FOR INFLUENZA VACCINATION: ICD-10-CM

## 2021-11-23 DIAGNOSIS — Z13.29 SCREENING FOR THYROID DISORDER: ICD-10-CM

## 2021-11-23 DIAGNOSIS — Z13.1 SCREENING FOR DIABETES MELLITUS: Primary | ICD-10-CM

## 2021-11-23 DIAGNOSIS — Z12.31 VISIT FOR SCREENING MAMMOGRAM: ICD-10-CM

## 2021-11-23 DIAGNOSIS — E03.9 HYPOTHYROIDISM, UNSPECIFIED TYPE: ICD-10-CM

## 2021-11-23 DIAGNOSIS — B02.9 HERPES ZOSTER WITHOUT COMPLICATION: ICD-10-CM

## 2021-11-23 DIAGNOSIS — Z13.220 SCREENING FOR LIPID DISORDERS: ICD-10-CM

## 2021-11-23 DIAGNOSIS — E55.9 VITAMIN D DEFICIENCY: ICD-10-CM

## 2021-11-23 DIAGNOSIS — F41.9 ANXIETY: ICD-10-CM

## 2021-11-23 DIAGNOSIS — Z00.00 ROUTINE GENERAL MEDICAL EXAMINATION AT A HEALTH CARE FACILITY: ICD-10-CM

## 2021-11-23 LAB
ALBUMIN SERPL-MCNC: 4.7 G/DL (ref 3.5–5.7)
ALP SERPL-CCNC: 119 U/L (ref 34–104)
ALT SERPL W P-5'-P-CCNC: 23 U/L (ref 7–52)
ANION GAP SERPL CALCULATED.3IONS-SCNC: 7 MMOL/L (ref 3–14)
AST SERPL W P-5'-P-CCNC: 17 U/L (ref 13–39)
BASOPHILS # BLD AUTO: 0 10E3/UL (ref 0–0.2)
BASOPHILS NFR BLD AUTO: 1 %
BILIRUB SERPL-MCNC: 0.5 MG/DL (ref 0.3–1)
BUN SERPL-MCNC: 14 MG/DL (ref 7–25)
CALCIUM SERPL-MCNC: 10.2 MG/DL (ref 8.6–10.3)
CHLORIDE BLD-SCNC: 100 MMOL/L (ref 98–107)
CHOLEST SERPL-MCNC: 251 MG/DL
CO2 SERPL-SCNC: 30 MMOL/L (ref 21–31)
CREAT SERPL-MCNC: 0.63 MG/DL (ref 0.6–1.2)
DEPRECATED CALCIDIOL+CALCIFEROL SERPL-MC: 60 UG/L (ref 30–100)
EOSINOPHIL # BLD AUTO: 0.1 10E3/UL (ref 0–0.7)
EOSINOPHIL NFR BLD AUTO: 1 %
ERYTHROCYTE [DISTWIDTH] IN BLOOD BY AUTOMATED COUNT: 12.1 % (ref 10–15)
FASTING STATUS PATIENT QL REPORTED: ABNORMAL
GFR SERPL CREATININE-BSD FRML MDRD: >90 ML/MIN/1.73M2
GLUCOSE BLD-MCNC: 91 MG/DL (ref 70–105)
HBA1C MFR BLD: 5.5 % (ref 4–6.2)
HCT VFR BLD AUTO: 42.3 % (ref 35–47)
HDLC SERPL-MCNC: 54 MG/DL (ref 23–92)
HGB BLD-MCNC: 14.1 G/DL (ref 11.7–15.7)
IMM GRANULOCYTES # BLD: 0 10E3/UL
IMM GRANULOCYTES NFR BLD: 0 %
LDLC SERPL CALC-MCNC: 165 MG/DL
LYMPHOCYTES # BLD AUTO: 1.9 10E3/UL (ref 0.8–5.3)
LYMPHOCYTES NFR BLD AUTO: 38 %
MCH RBC QN AUTO: 31.2 PG (ref 26.5–33)
MCHC RBC AUTO-ENTMCNC: 33.3 G/DL (ref 31.5–36.5)
MCV RBC AUTO: 94 FL (ref 78–100)
MONOCYTES # BLD AUTO: 0.3 10E3/UL (ref 0–1.3)
MONOCYTES NFR BLD AUTO: 7 %
NEUTROPHILS # BLD AUTO: 2.6 10E3/UL (ref 1.6–8.3)
NEUTROPHILS NFR BLD AUTO: 53 %
NONHDLC SERPL-MCNC: 197 MG/DL
NRBC # BLD AUTO: 0 10E3/UL
NRBC BLD AUTO-RTO: 0 /100
PLATELET # BLD AUTO: 351 10E3/UL (ref 150–450)
POTASSIUM BLD-SCNC: 3.9 MMOL/L (ref 3.5–5.1)
PROT SERPL-MCNC: 6.9 G/DL (ref 6.4–8.9)
RBC # BLD AUTO: 4.52 10E6/UL (ref 3.8–5.2)
SODIUM SERPL-SCNC: 137 MMOL/L (ref 134–144)
TRIGL SERPL-MCNC: 161 MG/DL
TSH SERPL DL<=0.005 MIU/L-ACNC: 0.88 MU/L (ref 0.4–4)
WBC # BLD AUTO: 5 10E3/UL (ref 4–11)

## 2021-11-23 PROCEDURE — 90471 IMMUNIZATION ADMIN: CPT | Performed by: NURSE PRACTITIONER

## 2021-11-23 PROCEDURE — 85004 AUTOMATED DIFF WBC COUNT: CPT | Mod: ZL | Performed by: NURSE PRACTITIONER

## 2021-11-23 PROCEDURE — 90682 RIV4 VACC RECOMBINANT DNA IM: CPT | Performed by: NURSE PRACTITIONER

## 2021-11-23 PROCEDURE — 80053 COMPREHEN METABOLIC PANEL: CPT | Mod: ZL | Performed by: NURSE PRACTITIONER

## 2021-11-23 PROCEDURE — 77063 BREAST TOMOSYNTHESIS BI: CPT

## 2021-11-23 PROCEDURE — 84443 ASSAY THYROID STIM HORMONE: CPT | Mod: ZL | Performed by: NURSE PRACTITIONER

## 2021-11-23 PROCEDURE — 80061 LIPID PANEL: CPT | Mod: ZL | Performed by: NURSE PRACTITIONER

## 2021-11-23 PROCEDURE — 82306 VITAMIN D 25 HYDROXY: CPT | Mod: ZL | Performed by: NURSE PRACTITIONER

## 2021-11-23 PROCEDURE — 36415 COLL VENOUS BLD VENIPUNCTURE: CPT | Mod: ZL | Performed by: NURSE PRACTITIONER

## 2021-11-23 PROCEDURE — 83036 HEMOGLOBIN GLYCOSYLATED A1C: CPT | Mod: ZL | Performed by: NURSE PRACTITIONER

## 2021-11-23 PROCEDURE — 99396 PREV VISIT EST AGE 40-64: CPT | Mod: 25 | Performed by: NURSE PRACTITIONER

## 2021-11-23 RX ORDER — METHOCARBAMOL 750 MG/1
TABLET, FILM COATED ORAL
COMMUNITY
Start: 2021-11-22 | End: 2022-01-04

## 2021-11-23 RX ORDER — ACYCLOVIR 50 MG/G
OINTMENT TOPICAL
Qty: 15 G | Refills: 3 | Status: SHIPPED | OUTPATIENT
Start: 2021-11-23 | End: 2022-05-23

## 2021-11-23 RX ORDER — OXYCODONE HYDROCHLORIDE 5 MG/1
TABLET ORAL
COMMUNITY
Start: 2021-11-02 | End: 2021-12-28

## 2021-11-23 RX ORDER — CLONAZEPAM 0.5 MG/1
0.25 TABLET ORAL
Qty: 60 TABLET | Refills: 0 | Status: SHIPPED | OUTPATIENT
Start: 2021-11-23 | End: 2022-05-23

## 2021-11-23 RX ORDER — LEVOTHYROXINE SODIUM 112 UG/1
112 TABLET ORAL DAILY
Qty: 90 TABLET | Refills: 3 | Status: SHIPPED | OUTPATIENT
Start: 2021-11-23 | End: 2022-11-08

## 2021-11-23 ASSESSMENT — PAIN SCALES - GENERAL: PAINLEVEL: MODERATE PAIN (5)

## 2021-11-23 NOTE — PROGRESS NOTES
SUBJECTIVE:   CC: Shirley Hollins is an 65 year old woman who presents for preventive health visit.     Patient has been advised of split billing requirements and indicates understanding: Yes     HPI  Ability to successfully perform activities of daily living: Yes, no assistance needed  Home safety:  none identified   Hearing impairment: No    Today's PHQ-2 Score:   PHQ-2 (  [employer]) 10/19/2021   Q1: Little interest or pleasure in doing things 0   Q2: Feeling down, depressed or hopeless 0   PHQ-2 Score 0   PHQ-2 Total Score (12-17 Years)- Positive if 3 or more points; Administer PHQ-A if positive 0     Abuse: Current or Past (Physical, Sexual or Emotional) - NO  Do you feel safe in your environment? Yes    Have you ever done Advance Care Planning? (For example, a Health Directive, POLST, or a discussion with a medical provider or your loved ones about your wishes): Yes, patient states has an Advance Care Planning document and will bring a copy to the clinic.    Social History     Tobacco Use     Smoking status: Former Smoker     Packs/day: 0.15     Years: 20.00     Pack years: 3.00     Types: Cigarettes     Quit date: 2005     Years since quittin.9     Smokeless tobacco: Never Used   Substance Use Topics     Alcohol use: Yes     Comment: Occasional     No flowsheet data found.    Reviewed orders with patient.  Reviewed health maintenance and updated orders accordingly - Yes  BP Readings from Last 3 Encounters:   21 122/72   10/19/21 126/74   07/15/21 122/64    Wt Readings from Last 3 Encounters:   21 65.8 kg (145 lb)   10/19/21 65 kg (143 lb 6.4 oz)   21 62.6 kg (138 lb)                  Patient Active Problem List   Diagnosis     Esophageal reflux     H/O adenomatous polyp of colon     Health care maintenance     Hyperlipidemia     Hypothyroidism     Panic anxiety syndrome     Osteopenia of lumbar spine     SI (sacroiliac) joint dysfunction     Bilateral low back pain with  right-sided sciatica, unspecified chronicity     Weakness of right leg     Past Surgical History:   Procedure Laterality Date     AS LYSIS ADNEXAL ADHESIONS        SECTION       CL AFF SURGICAL PATHOLOGY Left 02/15/2016    L hip/trochanteric bursa. Large inflamed synovial cyst and bursa. U Dr. Zhou Harding     COLONOSCOPY      WNL     COLONOSCOPY      Adenomatous polyp     ESOPHAGOSCOPY, GASTROSCOPY, DUODENOSCOPY (EGD), COMBINED      ,gastritis and esophagitis; normal     HYSTERECTOMY TOTAL ABDOMINAL      with BSO (one ovary at a time)     LAPAROSCOPIC CHOLECYSTECTOMY       OOPHORECTOMY Right      TONSILLECTOMY, ADENOIDECTOMY, COMBINED         Social History     Tobacco Use     Smoking status: Former Smoker     Packs/day: 0.15     Years: 20.00     Pack years: 3.00     Types: Cigarettes     Quit date: 2005     Years since quittin.9     Smokeless tobacco: Never Used   Substance Use Topics     Alcohol use: Yes     Comment: Occasional     Family History   Problem Relation Age of Onset     Other - See Comments Mother         Cirrhosis, secondary to CASTILLO     Diabetes Mother      Cancer Father         Step father, cancer of esophagus     Cancer Other         Cancer,Lung cancer, both smokers     Thyroid Disease Maternal Grandmother      Cancer Maternal Aunt         Cancer,Pancreatic cancer     Breast Cancer No family hx of         Cancer-breast         Current Outpatient Medications   Medication Sig Dispense Refill     cholecalciferol (VITAMIN D3) 1000 UNIT tablet Take 1 tablet (1,000 Units) by mouth daily 100 tablet 3     clonazePAM (KLONOPIN) 0.5 MG tablet Take 1 tablet by mouth twice daily as needed for anxiety 60 tablet 0     famotidine (PEPCID) 40 MG tablet Take 40 mg by mouth daily       ibuprofen (ADVIL/MOTRIN) 800 MG tablet TAKE 1 TABLET BY MOUTH EVERY 8 HOURS AS NEEDED FOR MODERATE PAIN 60 tablet 1     levothyroxine (SYNTHROID/LEVOTHROID) 112 MCG tablet  Take 1 tablet (112 mcg) by mouth daily 90 tablet 3     methocarbamol (ROBAXIN) 750 MG tablet        NEW  mcg CBD Oil nightly as needed for sleep       oxyCODONE (ROXICODONE) 5 MG tablet        Allergies   Allergen Reactions     Trazodone      Other reaction(s): Headache, Hypertension  RCVS     Recent Labs   Lab Test 10/19/21  1032 11/19/20  0858 11/18/19  1051 11/06/18  0814   A1C  --  5.5  --   --    LDL  --  151* 142* 154*   HDL  --  53 49 54   TRIG  --  92 126 157*   ALT 18 17 18 22   CR 0.64 0.74 0.69 0.72   GFRESTIMATED >90 80 87 83   GFRESTBLACK  --  >90 >90 >90   POTASSIUM 4.3 4.1 4.0 3.9        Breast Cancer Screening:  Any new diagnosis of family breast, ovarian, or bowel cancer? No    FHS-7:   Breast CA Risk Assessment (FHS-7) 11/23/2021   Did any of your first-degree relatives have breast or ovarian cancer? No   Did any of your relatives have bilateral breast cancer? No   Did any man in your family have breast cancer? No   Did any woman in your family have breast cancer before age 50 y? No   Do you have 2 or more relatives with breast and/or ovarian cancer? No   Do you have 2 or more relatives with breast and/or bowel cancer? No       Mammogram Screening: Recommended mammography every 1-2 years with patient discussion and risk factor consideration. HAS ONE SCHEDULED TODAY.  Pertinent mammograms are reviewed under the imaging tab.    History of abnormal Pap smear: Status post benign hysterectomy. Health Maintenance and Surgical History updated.     Reviewed and updated as needed this visit by clinical staff  Tobacco  Allergies  Meds   Med Hx  Surg Hx  Fam Hx  Soc Hx   Reviewed and updated as needed this visit by Provider               Review of Systems  CONSTITUTIONAL: NEGATIVE for fever, chills, change in weight  INTEGUMENTARY/SKIN: NEGATIVE for worrisome rashes, moles or lesions  EYES: NEGATIVE for vision changes or irritation  ENT: NEGATIVE for ear, mouth and throat problems  RESP: NEGATIVE  for significant cough or SOB  CV: NEGATIVE for chest pain, palpitations or peripheral edema  GI: NEGATIVE for nausea, abdominal pain, heartburn, or change in bowel habits  : NEGATIVE for unusual urinary or vaginal symptoms. No vaginal bleeding.  MUSCULOSKELETAL: NEGATIVE for significant arthralgias or myalgia  NEURO: NEGATIVE for weakness, dizziness or paresthesias  PSYCHIATRIC: NEGATIVE for changes in mood or affect      OBJECTIVE:   /72 (BP Location: Right arm, Patient Position: Sitting, Cuff Size: Adult Regular)   Pulse 80   Temp 98.5  F (36.9  C) (Tympanic)   Resp 16   Wt 65.8 kg (145 lb)   LMP  (LMP Unknown)   SpO2 100%   Breastfeeding No   BMI 24.13 kg/m       Physical Exam  GENERAL APPEARANCE: healthy, alert and no distress  EYES: Eyes grossly normal to inspection, PERRL and conjunctivae and sclerae normal  HENT: ear canals and TM's normal, nose and mouth without ulcers or lesions, oropharynx clear and oral mucous membranes moist  NECK: no adenopathy, no asymmetry, masses, or scars and thyroid normal to palpation  RESP: lungs clear to auscultation - no rales, rhonchi or wheezes  CV: regular rate and rhythm, normal S1 S2, no S3 or S4, no murmur, click or rub, no peripheral edema and peripheral pulses strong  ABDOMEN: soft, nontender, no hepatosplenomegaly, no masses and bowel sounds normal  MS: no musculoskeletal defects are noted and gait is age appropriate without ataxia  SKIN: no suspicious lesions or rashes  NEURO: Normal strength and tone, sensory exam grossly normal, mentation intact and speech normal  PSYCH: mentation appears normal and affect normal/bright    Diagnostic Test Results:  Results for orders placed or performed during the hospital encounter of 11/23/21   MA Screen Bilateral w/Duc     Status: None    Narrative    BILATERAL FULL FIELD DIGITAL SCREENING MAMMOGRAM WITH TOMOSYNTHESIS    Performed on: 11/23/21    Compared to: 11/19/2020, 11/18/2019, 11/13/2018, 11/06/2018,  11/03/2017,   10/12/2016, 10/06/2015, 09/24/2014, 09/23/2013, and 09/18/2012    Technique:  This study was evaluated with the assistance of Computer-Aided   Detection.  Breast Tomosynthesis was used in interpretation.    Findings: The breasts are heterogeneously dense, which may obscure small   masses.  There are benign findings, not significantly changed of both   breasts.  There is no radiographic evidence of malignancy.     IMPRESSION: ACR BI-RADS Category 2: Benign    RECOMMENDED FOLLOW-UP: Annual routine screening mammogram    The results and recommendations of this examination will be communicated   to the patient.     Results for orders placed or performed in visit on 11/23/21   Comprehensive metabolic panel     Status: Abnormal   Result Value Ref Range    Sodium 137 134 - 144 mmol/L    Potassium 3.9 3.5 - 5.1 mmol/L    Chloride 100 98 - 107 mmol/L    Carbon Dioxide (CO2) 30 21 - 31 mmol/L    Anion Gap 7 3 - 14 mmol/L    Urea Nitrogen 14 7 - 25 mg/dL    Creatinine 0.63 0.60 - 1.20 mg/dL    Calcium 10.2 8.6 - 10.3 mg/dL    Glucose 91 70 - 105 mg/dL    Alkaline Phosphatase 119 (H) 34 - 104 U/L    AST 17 13 - 39 U/L    ALT 23 7 - 52 U/L    Protein Total 6.9 6.4 - 8.9 g/dL    Albumin 4.7 3.5 - 5.7 g/dL    Bilirubin Total 0.5 0.3 - 1.0 mg/dL    GFR Estimate >90 >60 mL/min/1.73m2   TSH with free T4 reflex     Status: Normal   Result Value Ref Range    TSH 0.88 0.40 - 4.00 mU/L   Lipid Profile     Status: Abnormal   Result Value Ref Range    Cholesterol 251 (H) <200 mg/dL    Triglycerides 161 (H) <150 mg/dL    Direct Measure HDL 54 23 - 92 mg/dL    LDL Cholesterol Calculated 165 (H) <=100 mg/dL    Non HDL Cholesterol 197 (H) <130 mg/dL    Patient Fasting > 8hrs? Unknown     Narrative    Cholesterol  Desirable:  <200 mg/dL    Triglycerides  Normal:  Less than 150 mg/dL  Borderline High:  150-199 mg/dL  High:  200-499 mg/dL  Very High:  Greater than or equal to 500 mg/dL    Direct Measure HDL  Female:  Greater than or  equal to 50 mg/dL   Male:  Greater than or equal to 40 mg/dL    LDL Cholesterol  Desirable:  <100mg/dL  Above Desirable:  100-129 mg/dL   Borderline High:  130-159 mg/dL   High:  160-189 mg/dL   Very High:  >= 190 mg/dL    Non HDL Cholesterol  Desirable:  130 mg/dL  Above Desirable:  130-159 mg/dL  Borderline High:  160-189 mg/dL  High:  190-219 mg/dL  Very High:  Greater than or equal to 220 mg/dL   Hemoglobin A1c     Status: Normal   Result Value Ref Range    Hemoglobin A1C 5.5 4.0 - 6.2 %   Vitamin D Total     Status: Normal   Result Value Ref Range    Vitamin D, Total (25-Hydroxy) 60 30 - 100 ug/L   CBC with platelets and differential     Status: None   Result Value Ref Range    WBC Count 5.0 4.0 - 11.0 10e3/uL    RBC Count 4.52 3.80 - 5.20 10e6/uL    Hemoglobin 14.1 11.7 - 15.7 g/dL    Hematocrit 42.3 35.0 - 47.0 %    MCV 94 78 - 100 fL    MCH 31.2 26.5 - 33.0 pg    MCHC 33.3 31.5 - 36.5 g/dL    RDW 12.1 10.0 - 15.0 %    Platelet Count 351 150 - 450 10e3/uL    % Neutrophils 53 %    % Lymphocytes 38 %    % Monocytes 7 %    % Eosinophils 1 %    % Basophils 1 %    % Immature Granulocytes 0 %    NRBCs per 100 WBC 0 <1 /100    Absolute Neutrophils 2.6 1.6 - 8.3 10e3/uL    Absolute Lymphocytes 1.9 0.8 - 5.3 10e3/uL    Absolute Monocytes 0.3 0.0 - 1.3 10e3/uL    Absolute Eosinophils 0.1 0.0 - 0.7 10e3/uL    Absolute Basophils 0.0 0.0 - 0.2 10e3/uL    Absolute Immature Granulocytes 0.0 <=0.0 10e3/uL    Absolute NRBCs 0.0 10e3/uL   CBC with Platelets & Differential     Status: None    Narrative    The following orders were created for panel order CBC with Platelets & Differential.  Procedure                               Abnormality         Status                     ---------                               -----------         ------                     CBC with platelets and d...[[id number]]                      Final result                 Please view results for these tests on the individual orders.     Labs reviewed  in Epic    ASSESSMENT/PLAN:   Shirley was seen today for physical and annual med review.    Diagnoses and all orders for this visit:    Hypothyroidism, unspecified type  Refill given today.  - levothyroxine (SYNTHROID/LEVOTHROID) 112 MCG tablet; Take 1 tablet (112 mcg) by mouth daily  - TSH with free T4 reflex, in goal range.  Continue current dosing.    Screening for diabetes mellitus  - Hemoglobin A1c, in goal range at 5.5.  Rescreen annually.    Need for influenza vaccination  Given in clinic today.  - INFLUENZA QUAD, RECOMBINANT, P-FREE (RIV4) (FLUBLOK)    Routine general medical examination at a health care facility  - Comprehensive metabolic panel, unremarkable with slightly elevated alk phos most likely due to increased Tylenol intake.  Recheck in 3 months.  - CBC with Platelets & Differential, unremarkable    Screening for lipid disorders  - Lipid Profile, levels all elevated today.  Advised patient to work on diet improvement and increasing daily exercise.  I suspect her recent inactivity is the cause behind the increase in these numbers due to her recent back surgery.  Recheck in 3 months.  If not improved at that time recommend discussing initiation of a statin.    Anxiety  Stable and currently well managed on as needed clonazepam.  Refill given today.  - clonazePAM (KLONOPIN) 0.5 MG tablet; Take 0.5 tablets (0.25 mg) by mouth nightly as needed for anxiety or sleep - May take 2 tablets if having anxiety attack or difficulty sleeping.    Vitamin D deficiency  - Vitamin D Total, in sufficient range today at 60.  Rescreen annually    Herpes zoster without complication  - acyclovir (ZOVIRAX) 5 % external ointment; Apply topically 6 times daily    Patient has been advised of split billing requirements and indicates understanding: Yes     COUNSELING:  Reviewed preventive health counseling, as reflected in patient instructions    Estimated body mass index is 23.86 kg/m  as calculated from the following:    Height  "as of 9/2/21: 1.651 m (5' 5\").    Weight as of 10/19/21: 65 kg (143 lb 6.4 oz).    She reports that she quit smoking about 16 years ago. Her smoking use included cigarettes. She has a 3.00 pack-year smoking history. She has never used smokeless tobacco.      Counseling Resources:  ATP IV Guidelines  Pooled Cohorts Equation Calculator  Breast Cancer Risk Calculator  BRCA-Related Cancer Risk Assessment: FHS-7 Tool  FRAX Risk Assessment  ICSI Preventive Guidelines  Dietary Guidelines for Americans, 2010  USDA's MyPlate  ASA Prophylaxis  Lung CA Screening    Cherelle Dean NP  [facility] AND [facility]  "

## 2021-11-23 NOTE — NURSING NOTE
"Chief Complaint   Patient presents with     Physical     Annual well visit       Initial /72 (BP Location: Right arm, Patient Position: Sitting, Cuff Size: Adult Regular)   Pulse 80   Temp 98.5  F (36.9  C) (Tympanic)   Resp 16   Wt 65.8 kg (145 lb)   LMP  (LMP Unknown)   SpO2 100%   Breastfeeding No   BMI 24.13 kg/m   Estimated body mass index is 24.13 kg/m  as calculated from the following:    Height as of 9/2/21: 1.651 m (5' 5\").    Weight as of this encounter: 65.8 kg (145 lb).     Medication Reconciliation: complete    FOOD SECURITY SCREENING QUESTIONS  Hunger Vital Signs:  Within the past 12 months we worried whether our food would run out before we got money to buy more. Never  Within the past 12 months the food we bought just didn't last and we didn't have money to get more. Never    Advance care plan reviewed      Kimberlyn Escobar LPN      "

## 2021-11-23 NOTE — PATIENT INSTRUCTIONS

## 2021-12-06 ENCOUNTER — HOSPITAL ENCOUNTER (OUTPATIENT)
Dept: PHYSICAL THERAPY | Facility: OTHER | Age: 66
Setting detail: THERAPIES SERIES
End: 2021-12-06
Attending: STUDENT IN AN ORGANIZED HEALTH CARE EDUCATION/TRAINING PROGRAM
Payer: COMMERCIAL

## 2021-12-06 DIAGNOSIS — M53.3 SI (SACROILIAC) JOINT DYSFUNCTION: ICD-10-CM

## 2021-12-06 PROCEDURE — 97161 PT EVAL LOW COMPLEX 20 MIN: CPT | Mod: GP

## 2021-12-06 PROCEDURE — 97110 THERAPEUTIC EXERCISES: CPT | Mod: GP

## 2021-12-06 PROCEDURE — 97140 MANUAL THERAPY 1/> REGIONS: CPT | Mod: GP

## 2021-12-07 NOTE — PROGRESS NOTES
"   12/06/21 0900   General Information   Type of Visit Initial OP Ortho PT Evaluation   Start of Care Date 12/06/21   Referring Physician Dr. Messi Maradiaga   Patient/Family Goals Statement To be able to walk, complete hoyse work w/o pain   Orders Evaluate and Treat   Date of Order 11/22/21   Certification Required? No   Medical Diagnosis SI (sacroiliac) joint dysfunction M53.3 S/P R SI jt fusion   Surgical/Medical history reviewed Yes   Precautions/Limitations no known precautions/limitations   Weight-Bearing Status - RLE weight-bearing as tolerated   Body Part(s)   Body Part(s) Lumbar Spine/SI   Presentation and Etiology   Pertinent history of current problem (include personal factors and/or comorbidities that impact the POC) Patient is a 64 y/o female whom presents to PT 4 weeks S/P right minimally invasive SI joint fusion. Rates a continued ache at this time of 2-6/10. She feels her walking is \"OK\" but her endurance is poor. Reports her right hip feels \"stiff\". Reports pain with weight bearing activities and walking up /down stairs. She was completing her HEP focused on hip strengthening up until her surgery. Her main c/o at this time is the persistent \"ache\" and decreased standing/walking endurance.     Impairments A. Pain;D. Decreased ROM;E. Decreased flexibility;F. Decreased strength and endurance   Functional Limitations perform activities of daily living;perform desired leisure / sports activities   Symptom Location Right lateral to poaterior hip.    How/Where did it occur From insidious onset   Onset date of current episode/exacerbation 11/02/21   Chronicity New   Pain rating (0-10 point scale) Best (/10);Worst (/10)   Best (/10) 2   Worst (/10) 6   Pain quality B. Dull;F. Stabbing   Frequency of pain/symptoms C. With activity   Pain/symptoms are: Worse during the day   Pain/symptoms exacerbated by B. Walking;G. Certain positions;I. Bending;C. Lifting   Pain/symptoms eased by C. Rest;I. OTC " medication(s);H. Cold   Current / Previous Interventions   Diagnostic Tests: MRI   MRI Results Results   MRI results S/P R fusion surgery   Prior Level of Function   Prior Level of Function-Mobility NML with intermittent episode of limited mpobility due to right hip pain.   Prior Level of Function-ADLs NML   Current Level of Function   Current Community Support Family/friend caregiver   Patient role/employment history E. Unemployed   Living environment House/townhome   Home/community accessibility NML   Fall Risk Screen   Fall screen completed by PT   Have you fallen 2 or more times in the past year? No   Have you fallen and had an injury in the past year? No   Is patient a fall risk? No   Abuse Screen (yes response referral indicated)   Feels Unsafe at Home or Work/School no   Feels Threatened by Someone no   Does Anyone Try to Keep You From Having Contact with Others or Doing Things Outside Your Home? no   Physical Signs of Abuse Present no   Lumbar Spine/SI Objective Findings   Observation The post op incision site is well healed   Integumentary NML   Posture Generally good   Gait/Locomotion Slight antalgia with decreased hip extension and stance time right.    Balance/Proprioception (Single Leg Stance) Fair + right , nml left   Flexion ROM to ankle with tightness right posterior hip   Extension ROM Limited    Right Side Bending ROM NML   Left Side Bending ROM NML   Repeated Extension-Standing ROM Not indicated   Repeated Flexion-Standing ROM Not Indicated   Lumbar ROM Comment Flexion limitation appears due to posterior hip soft tissue restriction   Pelvic Screen Pelvis is sitting level    Hip Screen Arianne, JEM and JEREMIAH all neg. Hip flexion limited to 100 deg right comp to 120 deg left. Right EROT is limited 10 deg from left   Hip Flexion (L2) Strength 4+   Hip Abduction Strength 3+   Hip Adduction Strength 4+   Hip Extension Strength 3+   Knee Flexion Strength 5   Knee Extension (L3) Strength 5   Ankle  Dorsiflexion (L4) Strength 5   Great Toe Extension (L5) Strength 5   Ankle Plantar Flexion (S1) Strength 5   Hamstring Flexibility Normal bilaterally   Hip Flexor Flexibility Noted tightness in the right cheerier hip   Quadricep Flexibility Slight reduction right compared to left   Obers Flexibility Slight reduction right compared to left   Piriformis Flexibility Noted tightness right compared to left   SLR Negative   Crossover SLR Negative   Slump Test Negative   Sensation Testing Normal   Neurological Testing Comments Intact   Palpation 2/4 tenderness with palpation of the right SI joint region including the piriformis and sciatic notch   Planned Therapy Interventions   Planned Therapy Interventions joint mobilization;manual therapy;ROM;strengthening;stretching;gait training   Planned Modality Interventions   Planned Modality Interventions Cryotherapy;Hot packs;Ultrasound   Planned Modality Interventions Comments Modalities may be used as an adjunct to the intended course of exercise based on manual physical therapy treatment   Clinical Impression   Criteria for Skilled Therapeutic Interventions Met yes, treatment indicated   PT Diagnosis Impaired mobility of the right lower extremity status post right SI joint fusion   Influenced by the following impairments Weakness, residual pain, muscle tightness and immobility   Functional limitations due to impairments Difficulty with longer duration standing beyond 5 to 10 minutes.  Difficulty with walking for distances in excess of 1/4 mile or 15 to 20 minutes.  Impaired sleep due to residual right hip pain and a difficulty laying in supine position.  Difficulty with home tasks which require forward flexion bending lifting and carrying weights up to 20 to 30 pounds   Clinical Presentation Stable/Uncomplicated   Clinical Decision Making (Complexity) Low complexity   Therapy Frequency 1 time/week  (Transition to [facility] as patient becomes independent)   Predicted Duration of  Therapy Intervention (days/wks) 8 weeks   Risk & Benefits of therapy have been explained Yes   Patient, Family & other staff in agreement with plan of care Yes   Clinical Impression Comments Patient's main impairment is due to residual right posterior hip pain and hip and mobility   Education Assessment   Preferred Learning Style Listening;Reading;Demonstration;Pictures/video   Barriers to Learning No barriers   ORTHO GOALS   PT Ortho Eval Goals 1;2;3   Ortho Goal 1   Goal Identifier Pain   Goal Description Patient will report she is able to walk for up to 1 to 2 miles as desired 3-4 times per week without limitation due to right posterior hip pain.  Patient will report she is able to sleep comfortably at least 5 of 7 nights per week without limitation due to right posterior hip pain.   Target Date 02/06/22   Ortho Goal 2   Goal Identifier Strength   Goal Description Patient will demonstrate full grade 5/5 MMT strength in the right lower extremity including the hip knee and ankle muscle groups.  Improved strength will provide the stability to the right hip and lumbopelvic region to allow normal gait as desired up to 1 to 2 miles 4-5 times per week and to support the ability to bend into flexion lift and carry weights up to 20 to 30 pounds as necessary 3-4 times per week for home tasks   Target Date 02/06/22   Ortho Goal 3   Goal Identifier Mobility   Goal Description Patient will demonstrate full right hip mobility equal to the left.  Hip flexion will be improved to 120 degrees, abduction to 45 degrees, abduction to 35 degrees, external rotation to 50 degrees and internal rotation to 40 degrees.  Restored mobility will allow patient to ambulate with normal gait pattern up to 1 to 2 miles per day 4 to 5 days/week as desired.  She also will have restored ability to flex the hip to a normal level allowing home tasks which require bending lifting carrying in 8 weeks   Target Date 02/06/22   Total Evaluation Time   PT  Funmi, Low Complexity Minutes ([id number]) 35

## 2021-12-09 ENCOUNTER — HOSPITAL ENCOUNTER (OUTPATIENT)
Dept: PHYSICAL THERAPY | Facility: OTHER | Age: 66
Setting detail: THERAPIES SERIES
End: 2021-12-09
Attending: STUDENT IN AN ORGANIZED HEALTH CARE EDUCATION/TRAINING PROGRAM
Payer: COMMERCIAL

## 2021-12-09 PROCEDURE — 97140 MANUAL THERAPY 1/> REGIONS: CPT | Mod: GP

## 2021-12-09 PROCEDURE — 97110 THERAPEUTIC EXERCISES: CPT | Mod: GP

## 2021-12-14 ENCOUNTER — HOSPITAL ENCOUNTER (OUTPATIENT)
Dept: PHYSICAL THERAPY | Facility: OTHER | Age: 66
Setting detail: THERAPIES SERIES
End: 2021-12-14
Attending: STUDENT IN AN ORGANIZED HEALTH CARE EDUCATION/TRAINING PROGRAM
Payer: COMMERCIAL

## 2021-12-14 PROCEDURE — 97140 MANUAL THERAPY 1/> REGIONS: CPT | Mod: GP

## 2021-12-15 DIAGNOSIS — M53.3 SI (SACROILIAC) JOINT DYSFUNCTION: Primary | ICD-10-CM

## 2021-12-17 ENCOUNTER — HOSPITAL ENCOUNTER (OUTPATIENT)
Dept: PHYSICAL THERAPY | Facility: OTHER | Age: 66
Setting detail: THERAPIES SERIES
End: 2021-12-17
Attending: STUDENT IN AN ORGANIZED HEALTH CARE EDUCATION/TRAINING PROGRAM
Payer: COMMERCIAL

## 2021-12-17 PROCEDURE — 97140 MANUAL THERAPY 1/> REGIONS: CPT | Mod: GP

## 2021-12-28 ENCOUNTER — HOSPITAL ENCOUNTER (EMERGENCY)
Facility: OTHER | Age: 66
Discharge: HOME | End: 2021-12-28
Attending: FAMILY MEDICINE | Admitting: FAMILY MEDICINE
Payer: COMMERCIAL

## 2021-12-28 VITALS
HEART RATE: 69 BPM | RESPIRATION RATE: 16 BRPM | HEIGHT: 65 IN | BODY MASS INDEX: 23.32 KG/M2 | DIASTOLIC BLOOD PRESSURE: 87 MMHG | WEIGHT: 140 LBS | OXYGEN SATURATION: 99 % | SYSTOLIC BLOOD PRESSURE: 167 MMHG | TEMPERATURE: 96.5 F

## 2021-12-28 DIAGNOSIS — R42 LIGHTHEADEDNESS: ICD-10-CM

## 2021-12-28 DIAGNOSIS — R03.0 ELEVATED BLOOD PRESSURE READING WITHOUT DIAGNOSIS OF HYPERTENSION: ICD-10-CM

## 2021-12-28 LAB
ALBUMIN SERPL-MCNC: 4.5 G/DL (ref 3.5–5.7)
ALP SERPL-CCNC: 77 U/L (ref 34–104)
ALT SERPL W P-5'-P-CCNC: 27 U/L (ref 7–52)
ANION GAP SERPL CALCULATED.3IONS-SCNC: 7 MMOL/L (ref 3–14)
AST SERPL W P-5'-P-CCNC: 24 U/L (ref 13–39)
BASOPHILS # BLD AUTO: 0 10E3/UL (ref 0–0.2)
BASOPHILS NFR BLD AUTO: 1 %
BILIRUB SERPL-MCNC: 0.5 MG/DL (ref 0.3–1)
BUN SERPL-MCNC: 13 MG/DL (ref 7–25)
CALCIUM SERPL-MCNC: 9.7 MG/DL (ref 8.6–10.3)
CHLORIDE BLD-SCNC: 100 MMOL/L (ref 98–107)
CO2 SERPL-SCNC: 27 MMOL/L (ref 21–31)
CREAT SERPL-MCNC: 0.65 MG/DL (ref 0.6–1.2)
EOSINOPHIL # BLD AUTO: 0.1 10E3/UL (ref 0–0.7)
EOSINOPHIL NFR BLD AUTO: 1 %
ERYTHROCYTE [DISTWIDTH] IN BLOOD BY AUTOMATED COUNT: 11.9 % (ref 10–15)
GFR SERPL CREATININE-BSD FRML MDRD: >90 ML/MIN/1.73M2
GLUCOSE BLD-MCNC: 99 MG/DL (ref 70–105)
HCT VFR BLD AUTO: 43.2 % (ref 35–47)
HGB BLD-MCNC: 14.5 G/DL (ref 11.7–15.7)
IMM GRANULOCYTES # BLD: 0 10E3/UL
IMM GRANULOCYTES NFR BLD: 0 %
LYMPHOCYTES # BLD AUTO: 1.6 10E3/UL (ref 0.8–5.3)
LYMPHOCYTES NFR BLD AUTO: 32 %
MCH RBC QN AUTO: 31.5 PG (ref 26.5–33)
MCHC RBC AUTO-ENTMCNC: 33.6 G/DL (ref 31.5–36.5)
MCV RBC AUTO: 94 FL (ref 78–100)
MONOCYTES # BLD AUTO: 0.5 10E3/UL (ref 0–1.3)
MONOCYTES NFR BLD AUTO: 10 %
NEUTROPHILS # BLD AUTO: 2.7 10E3/UL (ref 1.6–8.3)
NEUTROPHILS NFR BLD AUTO: 56 %
NRBC # BLD AUTO: 0 10E3/UL
NRBC BLD AUTO-RTO: 0 /100
PLATELET # BLD AUTO: 243 10E3/UL (ref 150–450)
POTASSIUM BLD-SCNC: 3.8 MMOL/L (ref 3.5–5.1)
PROT SERPL-MCNC: 6.9 G/DL (ref 6.4–8.9)
RBC # BLD AUTO: 4.61 10E6/UL (ref 3.8–5.2)
SODIUM SERPL-SCNC: 134 MMOL/L (ref 134–144)
TROPONIN I SERPL-MCNC: <2.4 PG/ML (ref 0–34)
WBC # BLD AUTO: 4.9 10E3/UL (ref 4–11)

## 2021-12-28 PROCEDURE — 36415 COLL VENOUS BLD VENIPUNCTURE: CPT | Performed by: FAMILY MEDICINE

## 2021-12-28 PROCEDURE — 80053 COMPREHEN METABOLIC PANEL: CPT | Performed by: FAMILY MEDICINE

## 2021-12-28 PROCEDURE — 93010 ELECTROCARDIOGRAM REPORT: CPT | Performed by: INTERNAL MEDICINE

## 2021-12-28 PROCEDURE — 99283 EMERGENCY DEPT VISIT LOW MDM: CPT | Performed by: FAMILY MEDICINE

## 2021-12-28 PROCEDURE — 93005 ELECTROCARDIOGRAM TRACING: CPT | Performed by: FAMILY MEDICINE

## 2021-12-28 PROCEDURE — 250N000013 HC RX MED GY IP 250 OP 250 PS 637: Performed by: FAMILY MEDICINE

## 2021-12-28 PROCEDURE — 84484 ASSAY OF TROPONIN QUANT: CPT | Performed by: FAMILY MEDICINE

## 2021-12-28 PROCEDURE — 85025 COMPLETE CBC W/AUTO DIFF WBC: CPT | Performed by: FAMILY MEDICINE

## 2021-12-28 PROCEDURE — 99284 EMERGENCY DEPT VISIT MOD MDM: CPT | Performed by: FAMILY MEDICINE

## 2021-12-28 RX ORDER — AMLODIPINE BESYLATE 2.5 MG/1
2.5 TABLET ORAL DAILY
Qty: 30 TABLET | Refills: 0 | Status: SHIPPED | OUTPATIENT
Start: 2021-12-28 | End: 2022-01-04

## 2021-12-28 RX ORDER — CLONAZEPAM 0.5 MG/1
0.5 TABLET ORAL ONCE
Status: COMPLETED | OUTPATIENT
Start: 2021-12-28 | End: 2021-12-28

## 2021-12-28 RX ORDER — AMLODIPINE BESYLATE 5 MG/1
5 TABLET ORAL ONCE
Status: COMPLETED | OUTPATIENT
Start: 2021-12-28 | End: 2021-12-28

## 2021-12-28 RX ADMIN — AMLODIPINE BESYLATE 5 MG: 5 TABLET ORAL at 09:32

## 2021-12-28 RX ADMIN — CLONAZEPAM 0.5 MG: 0.5 TABLET ORAL at 09:47

## 2021-12-28 ASSESSMENT — ENCOUNTER SYMPTOMS
DIFFICULTY URINATING: 0
NECK STIFFNESS: 0
CONFUSION: 0
HEADACHES: 0
EYE REDNESS: 0
FEVER: 0
ARTHRALGIAS: 0
SHORTNESS OF BREATH: 0
COLOR CHANGE: 0
LIGHT-HEADEDNESS: 1
ABDOMINAL PAIN: 0

## 2021-12-28 ASSESSMENT — MIFFLIN-ST. JEOR: SCORE: 1185.92

## 2021-12-28 NOTE — ED TRIAGE NOTES
"ED Nursing Triage Note (General)   ________________________________    Shirley Hollins is a 66 year old Female that presents to triage private car  With history of  Dizzy on and off for one week, worse when eyes closed, not changed with position changes. Left ear feels plugged. Headaches started 1 week ago. Pressure in front of eyes, back of head. No nausea. No chest pain. Feels like she is going to pass out just sitting there. Surgery nov 2 on SI joint- \"more bedridden\" for about a month; no recent illness.  this is reported by patient   Significant symptoms had onset 1 week(s) ago.  BP (!) 192/92   Pulse 90   Temp (!) 96.5  F (35.8  C) (Tympanic)   Resp 16   LMP  (LMP Unknown)   SpO2 98% t  Patient appears alert  and oriented, in no acute distress., and cooperative, pleasant and calm behavior.    GCS Total = 15  Airway: intact  Breathing noted as Normal  Circulation Normal  Skin:  Normal        PRE HOSPITAL PRIOR LIVING SITUATION Spouse      "

## 2021-12-28 NOTE — ED PROVIDER NOTES
"  History     Chief Complaint   Patient presents with     Dizziness     HPI  Shirley Hollins is a 66 year old female who presents emergency department with lightheadedness.  Patient describes this as just a \"woozy feeling\" patient does not endorse room spinning dizziness.  Patient states that her left ear feels plugged.  Mild head pressure, no severe thunderclap headache.  Patient does have history in the remote past of \"reversible ischemia\" this is remote in the past and was associated with an elevated blood pressure and headache, she has not had anything like this in years.  This does not feel like that as it is much less severe.  Patient's blood pressure normally is within normal range and she is not been on any blood pressure medications recently.  No chest pain or shortness of breath.    Reviewed nurses notes below, similar history is related to me.  Shirley Hollins is a 66 year old Female that presents to triage private car  With history of  Dizzy on and off for one week, worse when eyes closed, not changed with position changes. Left ear feels plugged. Headaches started 1 week ago. Pressure in front of eyes, back of head. No nausea. No chest pain. Feels like she is going to pass out just sitting there. Surgery nov 2 on SI joint- \"more bedridden\" for about a month; no recent illness.  this is reported by patient   Significant symptoms had onset 1 week(s) ago.  Allergies:  Allergies   Allergen Reactions     Trazodone      Other reaction(s): Headache, Hypertension  RCVS       Problem List:    Patient Active Problem List    Diagnosis Date Noted     SI (sacroiliac) joint dysfunction 10/16/2020     Priority: Medium     Bilateral low back pain with right-sided sciatica, unspecified chronicity 10/16/2020     Priority: Medium     Weakness of right leg 10/16/2020     Priority: Medium     Osteopenia of lumbar spine 11/06/2018     Priority: Medium     Esophageal reflux 02/20/2018     Priority: Medium     H/O adenomatous " polyp of colon 2018     Priority: Medium     Overview:   Tubular adenoma with low grade dysplasia at 8 cm. noted on colonoscopy       Hyperlipidemia 2018     Priority: Medium     Hypothyroidism 2018     Priority: Medium     Health care maintenance 10/02/2016     Priority: Medium     Overview:   Colonoscopy: Done in  and normal other than diverticular disease, repeat    Breast Exam/Mammography: Ordered 2016  Pap smear: Never any abnormal, no longer performing due to hysterectomy  DEXA: Age 67 or as indicated sooner  Immunizations: UTD at this time  Lipids/Annual Exam: Done in 2016 and mildly elevated.  Monitor  Hepatitis C screen: NR in        Panic anxiety syndrome 10/02/2016     Priority: Medium        Past Medical History:    Past Medical History:   Diagnosis Date     Adenomatous colon polyp      Basal cell carcinoma 2020     Benign lipomatous neoplasm of skin and subcutaneous tissue of extremity 06/15/2017     Diverticulosis of intestine without perforation or abscess without bleeding      Ectopic pregnancy without intrauterine pregnancy      Gastro-esophageal reflux disease without esophagitis      Hyperlipidemia      Hypothyroidism      Nonspecific reaction to tuberculin skin test without active tuberculosis      Other bursal cyst, left hip 2016     Other complicated headache syndrome      Panic disorder without agoraphobia      Seborrheic dermatitis 2015     Transaminitis        Past Surgical History:    Past Surgical History:   Procedure Laterality Date     AS LYSIS ADNEXAL ADHESIONS        SECTION       CL AFF SURGICAL PATHOLOGY Left 02/15/2016    L hip/trochanteric bursa. Large inflamed synovial cyst and bursa. U of Dr. Zhou MACHADO     COLONOSCOPY      WNL     COLONOSCOPY      Adenomatous polyp     ESOPHAGOSCOPY, GASTROSCOPY, DUODENOSCOPY (EGD), COMBINED      ,gastritis and esophagitis; normal     HYSTERECTOMY TOTAL  ABDOMINAL      with BSO (one ovary at a time)     LAPAROSCOPIC CHOLECYSTECTOMY  2001     OOPHORECTOMY Right 1988     TONSILLECTOMY, ADENOIDECTOMY, COMBINED  1971       Family History:    Family History   Problem Relation Age of Onset     Other - See Comments Mother         Cirrhosis, secondary to CASTILLO     Diabetes Mother      Cancer Father         Step father, cancer of esophagus     Cancer Other         Cancer,Lung cancer, both smokers     Thyroid Disease Maternal Grandmother      Cancer Maternal Aunt         Cancer,Pancreatic cancer     Breast Cancer No family hx of         Cancer-breast       Social History:  Marital Status:   [2]  Social History     Tobacco Use     Smoking status: Former Smoker     Packs/day: 0.15     Years: 20.00     Pack years: 3.00     Types: Cigarettes     Quit date: 2005     Years since quittin.0     Smokeless tobacco: Never Used   Vaping Use     Vaping Use: Never used   Substance Use Topics     Alcohol use: Yes     Comment: Occasional     Drug use: No     Comment: Drug use: No IV drug use        Medications:    acyclovir (ZOVIRAX) 5 % external ointment  amLODIPine (NORVASC) 2.5 MG tablet  cholecalciferol (VITAMIN D3) 1000 UNIT tablet  clonazePAM (KLONOPIN) 0.5 MG tablet  famotidine (PEPCID) 40 MG tablet  ibuprofen (ADVIL/MOTRIN) 800 MG tablet  levothyroxine (SYNTHROID/LEVOTHROID) 112 MCG tablet  methocarbamol (ROBAXIN) 750 MG tablet  NEW MED          Review of Systems   Constitutional: Negative for fever.   HENT: Negative for congestion.    Eyes: Negative for redness.   Respiratory: Negative for shortness of breath.    Cardiovascular: Negative for chest pain.   Gastrointestinal: Negative for abdominal pain.   Genitourinary: Negative for difficulty urinating.   Musculoskeletal: Negative for arthralgias and neck stiffness.   Skin: Negative for color change.   Neurological: Positive for light-headedness. Negative for headaches.   Psychiatric/Behavioral: Negative for  "confusion.       Physical Exam   BP: (!) 192/92  Pulse: 90  Temp: (!) 96.5  F (35.8  C)  Resp: 16  Height: 165.1 cm (5' 5\")  Weight: 63.5 kg (140 lb)  SpO2: 98 %      Physical Exam  Vitals and nursing note reviewed.   HENT:      Right Ear: Tympanic membrane is retracted.      Left Ear: Tympanic membrane is retracted.   Cardiovascular:      Rate and Rhythm: Normal rate.      Pulses: Normal pulses.   Pulmonary:      Effort: Pulmonary effort is normal.   Musculoskeletal:         General: No swelling.      Cervical back: Normal range of motion.   Neurological:      General: No focal deficit present.      GCS: GCS eye subscore is 4. GCS verbal subscore is 5. GCS motor subscore is 6.      Cranial Nerves: Cranial nerves are intact.         ED Course   EKG: Sinus rhythm, rate 68, 442 QTC.       Results for orders placed or performed during the hospital encounter of 12/28/21 (from the past 24 hour(s))   CBC with platelets differential    Narrative    The following orders were created for panel order CBC with platelets differential.  Procedure                               Abnormality         Status                     ---------                               -----------         ------                     CBC with platelets and d...[[id number]]                      Final result                 Please view results for these tests on the individual orders.   Comprehensive metabolic panel   Result Value Ref Range    Sodium 134 134 - 144 mmol/L    Potassium 3.8 3.5 - 5.1 mmol/L    Chloride 100 98 - 107 mmol/L    Carbon Dioxide (CO2) 27 21 - 31 mmol/L    Anion Gap 7 3 - 14 mmol/L    Urea Nitrogen 13 7 - 25 mg/dL    Creatinine 0.65 0.60 - 1.20 mg/dL    Calcium 9.7 8.6 - 10.3 mg/dL    Glucose 99 70 - 105 mg/dL    Alkaline Phosphatase 77 34 - 104 U/L    AST 24 13 - 39 U/L    ALT 27 7 - 52 U/L    Protein Total 6.9 6.4 - 8.9 g/dL    Albumin 4.5 3.5 - 5.7 g/dL    Bilirubin Total 0.5 0.3 - 1.0 mg/dL    GFR Estimate >90 >60 mL/min/1.73m2 "   Troponin I   Result Value Ref Range    Troponin I <2.4 0.0 - 34.0 pg/mL   CBC with platelets and differential   Result Value Ref Range    WBC Count 4.9 4.0 - 11.0 10e3/uL    RBC Count 4.61 3.80 - 5.20 10e6/uL    Hemoglobin 14.5 11.7 - 15.7 g/dL    Hematocrit 43.2 35.0 - 47.0 %    MCV 94 78 - 100 fL    MCH 31.5 26.5 - 33.0 pg    MCHC 33.6 31.5 - 36.5 g/dL    RDW 11.9 10.0 - 15.0 %    Platelet Count 243 150 - 450 10e3/uL    % Neutrophils 56 %    % Lymphocytes 32 %    % Monocytes 10 %    % Eosinophils 1 %    % Basophils 1 %    % Immature Granulocytes 0 %    NRBCs per 100 WBC 0 <1 /100    Absolute Neutrophils 2.7 1.6 - 8.3 10e3/uL    Absolute Lymphocytes 1.6 0.8 - 5.3 10e3/uL    Absolute Monocytes 0.5 0.0 - 1.3 10e3/uL    Absolute Eosinophils 0.1 0.0 - 0.7 10e3/uL    Absolute Basophils 0.0 0.0 - 0.2 10e3/uL    Absolute Immature Granulocytes 0.0 <=0.4 10e3/uL    Absolute NRBCs 0.0 10e3/uL       Medications   amLODIPine (NORVASC) tablet 5 mg (5 mg Oral Given 12/28/21 0932)   clonazePAM (klonoPIN) tablet 0.5 mg (0.5 mg Oral Given 12/28/21 0947)       Assessments & Plan (with Medical Decision Making)     I have reviewed the nursing notes.    I have reviewed the findings, diagnosis, plan and need for follow up with the patient.  Patient's neurologic exam was nonfocal and unremarkable and symptoms improved over the course of her ED stay.  Thusly I did not feel that she was a candidate for a head CT.  Patient is in agreement with this, if she worsens however anything changes that risk-benefit assessment may change.  Consider neuroimaging at follow-up if she has any development of thunderclap headache, focal neurologic changes or rapidly worsening symptoms.  New Prescriptions    AMLODIPINE (NORVASC) 2.5 MG TABLET    Take 1 tablet (2.5 mg) by mouth daily     Patient feeling much better over the course of her ER stay with decreasing blood pressures.  I do feel like her lightheadedness is likely multifactorial as she does appear  to have some eustachian tube dysfunction and however I do feel like elevated blood pressures were contributing to her symptoms as well.  Trial of Norvasc at home.  She was given Klonopin x1 in the ED for anxiety which she is prescribed that at home.  She did tolerate Norvasc well in the ED as well.  [facility]organ evaluation otherwise was unremarkable.  Return to the emergency department chest pain, shortness of breath, strokelike symptoms, visual changes, weakness nausea vomiting abdominal pain or fever.  Patient verbalized understanding plans agreement left ED improving edition.  Final diagnoses:   Lightheadedness   Elevated blood pressure reading without diagnosis of hypertension       12/28/2021   [facility]     Erick Lewis MD  12/28/21 9107

## 2021-12-31 LAB
ATRIAL RATE - MUSE: 68 BPM
DIASTOLIC BLOOD PRESSURE - MUSE: NORMAL MMHG
INTERPRETATION ECG - MUSE: NORMAL
P AXIS - MUSE: 65 DEGREES
PR INTERVAL - MUSE: 134 MS
QRS DURATION - MUSE: 82 MS
QT - MUSE: 416 MS
QTC - MUSE: 442 MS
R AXIS - MUSE: 56 DEGREES
SYSTOLIC BLOOD PRESSURE - MUSE: NORMAL MMHG
T AXIS - MUSE: 30 DEGREES
VENTRICULAR RATE- MUSE: 68 BPM

## 2022-01-04 ENCOUNTER — OFFICE VISIT (OUTPATIENT)
Dept: INTERNAL MEDICINE | Facility: OTHER | Age: 67
End: 2022-01-04
Attending: NURSE PRACTITIONER
Payer: COMMERCIAL

## 2022-01-04 VITALS
DIASTOLIC BLOOD PRESSURE: 82 MMHG | OXYGEN SATURATION: 100 % | HEART RATE: 84 BPM | SYSTOLIC BLOOD PRESSURE: 126 MMHG | RESPIRATION RATE: 16 BRPM | TEMPERATURE: 97.5 F | BODY MASS INDEX: 24.66 KG/M2 | WEIGHT: 148.2 LBS

## 2022-01-04 DIAGNOSIS — R42 DIZZINESS: Primary | ICD-10-CM

## 2022-01-04 DIAGNOSIS — Z09 ENCOUNTER FOR FOLLOW-UP EXAMINATION: ICD-10-CM

## 2022-01-04 DIAGNOSIS — Z79.899 HIGH RISK MEDICATION USE: ICD-10-CM

## 2022-01-04 DIAGNOSIS — R03.0 ELEVATED BLOOD PRESSURE READING WITHOUT DIAGNOSIS OF HYPERTENSION: ICD-10-CM

## 2022-01-04 LAB
ANION GAP SERPL CALCULATED.3IONS-SCNC: 8 MMOL/L (ref 3–14)
BUN SERPL-MCNC: 12 MG/DL (ref 7–25)
CALCIUM SERPL-MCNC: 10 MG/DL (ref 8.6–10.3)
CHLORIDE BLD-SCNC: 100 MMOL/L (ref 98–107)
CO2 SERPL-SCNC: 30 MMOL/L (ref 21–31)
CREAT SERPL-MCNC: 0.67 MG/DL (ref 0.6–1.2)
GFR SERPL CREATININE-BSD FRML MDRD: >90 ML/MIN/1.73M2
GLUCOSE BLD-MCNC: 90 MG/DL (ref 70–105)
POTASSIUM BLD-SCNC: 4 MMOL/L (ref 3.5–5.1)
SODIUM SERPL-SCNC: 138 MMOL/L (ref 134–144)

## 2022-01-04 PROCEDURE — 99213 OFFICE O/P EST LOW 20 MIN: CPT | Performed by: NURSE PRACTITIONER

## 2022-01-04 PROCEDURE — 36415 COLL VENOUS BLD VENIPUNCTURE: CPT | Mod: ZL | Performed by: NURSE PRACTITIONER

## 2022-01-04 PROCEDURE — 82310 ASSAY OF CALCIUM: CPT | Mod: ZL | Performed by: NURSE PRACTITIONER

## 2022-01-04 RX ORDER — CETIRIZINE HYDROCHLORIDE 10 MG/1
10 TABLET ORAL DAILY
COMMUNITY
Start: 2022-01-04 | End: 2022-05-23

## 2022-01-04 RX ORDER — AMLODIPINE BESYLATE 2.5 MG/1
2.5 TABLET ORAL DAILY
Qty: 30 TABLET | Refills: 0 | Status: SHIPPED | OUTPATIENT
Start: 2022-01-04 | End: 2022-02-14

## 2022-01-04 ASSESSMENT — PAIN SCALES - GENERAL: PAINLEVEL: MODERATE PAIN (4)

## 2022-01-04 NOTE — NURSING NOTE
"Chief Complaint   Patient presents with     RECHECK     ER F/U; B/P and dizziness   Follow up from ER for dizziness, low B/P; patient states that she thought B/P was low because of dizziness, when in fact B/P was high.   Patient states today that she has off and on dizziness, L sided neck pain, L ear feels plugged.  Patient states in ER she was told ear was retracted.    Initial /82 (BP Location: Right arm, Patient Position: Sitting, Cuff Size: Adult Regular)   Pulse 84   Temp 97.5  F (36.4  C) (Tympanic)   Resp 16   Wt 67.2 kg (148 lb 3.2 oz)   LMP  (LMP Unknown)   SpO2 100%   Breastfeeding No   BMI 24.66 kg/m   Estimated body mass index is 24.66 kg/m  as calculated from the following:    Height as of 12/28/21: 1.651 m (5' 5\").    Weight as of this encounter: 67.2 kg (148 lb 3.2 oz).     Medication Reconciliation: complete      FOOD SECURITY SCREENING QUESTIONS:    The next two questions are to help us understand your food security.  If you are feeling you need any assistance in this area, we have resources available to support you today.    Hunger Vital Signs:  Within the past 12 months we worried whether our food would run out before we got money to buy more. Never  Within the past 12 months the food we bought just didn't last and we didn't have money to get more. Never     Kimberlyn Escobar LPN,LPN on 1/4/2022 at 9:35 AM        Advance care plan reviewed      Kimberlyn Escobar LPN    "

## 2022-01-06 ENCOUNTER — HOSPITAL ENCOUNTER (OUTPATIENT)
Dept: PHYSICAL THERAPY | Facility: OTHER | Age: 67
Setting detail: THERAPIES SERIES
End: 2022-01-06
Attending: NURSE PRACTITIONER
Payer: COMMERCIAL

## 2022-01-06 ENCOUNTER — THERAPY VISIT (OUTPATIENT)
Dept: CHIROPRACTIC MEDICINE | Facility: OTHER | Age: 67
End: 2022-01-06
Attending: CHIROPRACTOR
Payer: COMMERCIAL

## 2022-01-06 VITALS
TEMPERATURE: 96.8 F | HEART RATE: 77 BPM | SYSTOLIC BLOOD PRESSURE: 126 MMHG | RESPIRATION RATE: 16 BRPM | OXYGEN SATURATION: 98 % | DIASTOLIC BLOOD PRESSURE: 66 MMHG

## 2022-01-06 DIAGNOSIS — H81.12 BENIGN PAROXYSMAL POSITIONAL VERTIGO OF LEFT EAR: ICD-10-CM

## 2022-01-06 DIAGNOSIS — M99.02 SEGMENTAL AND SOMATIC DYSFUNCTION OF THORACIC REGION: ICD-10-CM

## 2022-01-06 DIAGNOSIS — M54.2 DORSALGIA OF CERVICAL REGION: ICD-10-CM

## 2022-01-06 DIAGNOSIS — R42 DIZZINESS: ICD-10-CM

## 2022-01-06 DIAGNOSIS — M99.01 SEGMENTAL AND SOMATIC DYSFUNCTION OF CERVICAL REGION: Primary | ICD-10-CM

## 2022-01-06 PROCEDURE — 98940 CHIROPRACT MANJ 1-2 REGIONS: CPT | Mod: AT | Performed by: CHIROPRACTOR

## 2022-01-06 PROCEDURE — 97112 NEUROMUSCULAR REEDUCATION: CPT | Mod: GP,59

## 2022-01-06 PROCEDURE — 99213 OFFICE O/P EST LOW 20 MIN: CPT | Mod: 25 | Performed by: CHIROPRACTOR

## 2022-01-06 PROCEDURE — 95992 CANALITH REPOSITIONING PROC: CPT | Mod: GP

## 2022-01-06 PROCEDURE — 97162 PT EVAL MOD COMPLEX 30 MIN: CPT | Mod: GP

## 2022-01-06 NOTE — PROGRESS NOTES
Neck feels constant, sore and tight. 6/10 W24 7/10. Has tried heat and lavender rap, has given relief. Started a couple weeks ago.  Oliver Lozada DC on 1/6/2022 at 2:57 PM    PATIENT:  Shirley Hollins is a 66 year old female presenting for neck and upper back pain, dizzy symptoms    PROBLEM:   Date of Initial Visit for this Episode:  1/6/2022     Visit #1    SUBJECTIVE / HPI: Patient referred to our office by Cherelle Dean NP for evaluation and treatment of neck pain symptoms associated with recent dizzy spell.  On December 28, 2021 patient experienced dizziness along with neck and headache symptoms.  Immediately presented to the emergency department for concerns of possible stroke.  Evaluation performed by Dr. Eran DE LA TORRE did not show any neurological deficits but did show hypertension.  Patient reports that she has had occasional episodes of hypertension but never this severe.  During course of stay at emergency department symptoms seem to subside and patient was released.  Patient then followed up with primary care provider Cherelle Dean NP.  It was believed that there could be a cervicogenic component of patient's symptoms and thus she was referred to chiropractic as well as physical therapy to address cervicogenic factors and possible vestibular issues.  Denies any radiculopathy into the upper extremities.  Description and onset:  Duration and Frequency of Pain: 12/28/2021 and constant sore and tight  Radiation of pain: no  Pain rated at it's worst: 7/10  Pain rated currently:  6/10  Pain course: comes and goes  Worse with:  Supine to sitting, or transitional movements  Improved by:  Heat, medication  Additional Features: dizziness  Other Health Care Providers seen for this: ED, Cherelle Dean NP, scheduled to work with Holli Farris PT, history of chiropractic care with Dr. Evert AWAN  Previous treatment: evaluation and referral/medication, history of chiropractic care      See flowsheets in chart for  details.  2022   Neck Disability Index (  [employer] and Malcolm MARIE . All rights reserved.; used with permission) 2022   SECTION 1 - PAIN INTENSITY 2   SECTION 2 - PERSONAL CARE 0   SECTION 3 - LIFTING 0   SECTION 4 - READING 2   SECTION 5 - HEADACHES 1   SECTION 6 - CONCENTRATION 1   SECTION 7 - WORK 1   SECTION 8 - DRIVING 1   SECTION 9 - SLEEPING 0   SECTION 10 - RECREATION 1   Count 10   Sum 9   Raw Score: /50 9   Neck Disability Index Score: (%) 18     Past D.C. Care: yes, helpful       PAST MEDICAL HISTORY:  Past Medical History:   Diagnosis Date     Adenomatous colon polyp      Basal cell carcinoma 2020    lip     Benign lipomatous neoplasm of skin and subcutaneous tissue of extremity 06/15/2017     Diverticulosis of intestine without perforation or abscess without bleeding      Ectopic pregnancy without intrauterine pregnancy      Gastro-esophageal reflux disease without esophagitis      Hyperlipidemia      Hypothyroidism      Nonspecific reaction to tuberculin skin test without active tuberculosis     ,Normal CXR; treated with INH, incomplete treatment but mostly finished     Other bursal cyst, left hip 2016     Other complicated headache syndrome     ,Reversible cerebral vasoconstriction syndrome / Trazodone     Panic disorder without agoraphobia      Seborrheic dermatitis 2015     Transaminitis     with INH, resolved       PAST SURGICAL HISTORY:  Past Surgical History:   Procedure Laterality Date     AS LYSIS ADNEXAL ADHESIONS        SECTION       CL AFF SURGICAL PATHOLOGY Left 02/15/2016    L hip/trochanteric bursa. Large inflamed synovial cyst and bursa. U of Dr. Zhou MACHADO     COLONOSCOPY      WNL     COLONOSCOPY      Adenomatous polyp     ESOPHAGOSCOPY, GASTROSCOPY, DUODENOSCOPY (EGD), COMBINED      ,gastritis and esophagitis; normal     HYSTERECTOMY TOTAL ABDOMINAL      with BSO (one ovary at a time)     LAPAROSCOPIC  CHOLECYSTECTOMY  2001     OOPHORECTOMY Right      TONSILLECTOMY, ADENOIDECTOMY, COMBINED         ALLERGIES:  Allergies   Allergen Reactions     Trazodone      Other reaction(s): Headache, Hypertension  RCVS       CURRENT MEDICATIONS:  Current Outpatient Medications   Medication Sig Dispense Refill     acyclovir (ZOVIRAX) 5 % external ointment Apply topically 6 times daily 15 g 3     amLODIPine (NORVASC) 2.5 MG tablet Take 1 tablet (2.5 mg) by mouth daily 30 tablet 0     cetirizine (ZYRTEC) 10 MG tablet Take 1 tablet (10 mg) by mouth daily       cholecalciferol (VITAMIN D3) 1000 UNIT tablet Take 1 tablet (1,000 Units) by mouth daily 100 tablet 3     clonazePAM (KLONOPIN) 0.5 MG tablet Take 0.5 tablets (0.25 mg) by mouth nightly as needed for anxiety or sleep - May take 2 tablets if having anxiety attack or difficulty sleeping. 60 tablet 0     famotidine (PEPCID) 40 MG tablet Take 40 mg by mouth daily       levothyroxine (SYNTHROID/LEVOTHROID) 112 MCG tablet Take 1 tablet (112 mcg) by mouth daily 90 tablet 3     NEW  mcg CBD Oil nightly as needed for sleep         SOCIAL HISTORY:  Social History     Tobacco Use     Smoking status: Former Smoker     Packs/day: 0.15     Years: 20.00     Pack years: 3.00     Types: Cigarettes     Quit date: 2005     Years since quittin.0     Smokeless tobacco: Never Used   Vaping Use     Vaping Use: Never used   Substance Use Topics     Alcohol use: Yes     Comment: Occasional     Drug use: No     Comment: Drug use: No IV drug use       FAMILY HISTORY:  Family History   Problem Relation Age of Onset     Other - See Comments Mother         Cirrhosis, secondary to CASTILLO     Diabetes Mother      Cancer Father         Step father, cancer of esophagus     Cancer Other         Cancer,Lung cancer, both smokers     Thyroid Disease Maternal Grandmother      Cancer Maternal Aunt         Cancer,Pancreatic cancer     Breast Cancer No family hx of         Cancer-breast        Patient Active Problem List   Diagnosis     Esophageal reflux     H/O adenomatous polyp of colon     Health care maintenance     Hyperlipidemia     Hypothyroidism     Panic anxiety syndrome     Osteopenia of lumbar spine     SI (sacroiliac) joint dysfunction     Bilateral low back pain with right-sided sciatica, unspecified chronicity     Weakness of right leg     Encounter for follow-up examination         ROS:  The patient denies any fevers, chills, nausea, vomiting, diarrhea, constipation,dysuria, hematuria, or urinary hesitancy or incontinence.  No shortness of breath, chest pain, or rashes.    OBJECTIVE:    DIAGNOSTICS:  No current spinal imaging taken.     PHYSICAL EXAM:   /66 (BP Location: Right arm, Patient Position: Sitting)   Pulse 77   Temp 96.8  F (36  C) (Tympanic)   Resp 16   LMP  (LMP Unknown)   SpO2 98%      GENERAL APPEARANCE: healthy, alert, active, no distress and cooperative   GAIT: NORMAL  Otoscopic examination reveals mild retraction left tympanic membrane without erythematous changes or fluid accumulation, right tympanic membrane WNL  Equal and reactive pupils bilaterally  Auscultation of the carotids and subclavian arteries-no bruits    MUSCULOSKELETAL:   Posture: Anterior head carriage, rounded shoulders.   Gait:  unremarkable.     Cervical performed actively, measured approximately  ROM:   smooth/halting arc of motion   50/50 flexion    45/45 extension    35/45 RLF  30/45 LLF    80/85 RR         80/85 LR       -VBI: negative  -Maximal Foraminal Compression: +focal mild neck pain.   -Distraction improves  Hallpike maneuver: does not elicit dizziness, mild nystagmus noted on left       +Tenderness: Left side C1, left side T1, left side T4  +Muscle spasm: Left suboccipitals, upper trapezius and levator scapula on left, left rhomboids  +Joint asymmetry and restriction: C1 left lateral flexion right rotation restriction, T1 extension left lateral flexion restriction, T4  extension left lateral flexion restriction    ASSESSMENT: Shirley Hollins is a 66 year old female presenting with primary complaints of neck and upper back pain along with dizziness symptoms.  Chiropractic assessment does show segmental/somatic dysfunction of the cervical and upper thoracic regions.  While this can contribute to patient's symptoms of neck and upper back pain along with contributions to dizziness symptoms I do believe that further investigation is warranted for possible vestibular issue.  I was able to consult with patient's physical therapist regarding my findings.  I was also informed by patient's physical therapist that she was able to elicit positive Hallpike maneuver on left side and that Shirley responded quite favorably to the Epley's maneuver.  DDx to include but not limited to hypertension leading to dizziness, BPPV, possible TIA.  Evaluation on today's visit is encouraging however that this could be cervicogenic along with vestibular.  Patient informed me that she will be traveling after next week.  At this time no contraindications precluding patient from receiving care today.  Given the nature of patient's concerns lighter force adjusting techniques should be utilized.     1. Segmental and somatic dysfunction of cervical region    2. Segmental and somatic dysfunction of thoracic region    3. Dorsalgia of cervical region    4. Benign paroxysmal positional vertigo of left ear        PLAN    Evaluation and Management:  [id number] Moderate exam established patient 20 min    Procedures:  Modalities:  None performed this visit    CMT:  01066 Chiropractic manipulative treatment 1-2 regions performed   Cervical: Diversified and Mobilization, C1 , supine  Thoracic: Diversified, T1, T4, Anterior dorsal    Therapeutic procedures:  None    Response to Treatment  Reduction in symptoms as reported by patient    Prognosis: Good    1/6/2022 Plan of Care:  6-8 visits of Chiropractic Care including Spinal  Adjustments and/or physiotherapy and active rehabilitation, to include exercises in the office and/or at home to meet care plan goals.     Frequency: 2xweek for up to 4 weeks. A reevaluation would be clinically appropriate in 6-8 visits, to determine progress and further course of care.    POC discussed and patient agreeable to plan of care.      1/6/2022 Goals:      Patient will report improved pain.   Patient will report able to perform transitional movements without aggravation of neck symptoms or dizziness.   Patient will demonstrate an improved ability to complete Activities of Daily Living  as shown by a reported 10-30% reduced score on neck index.    Patient will demonstrate improved ROM.        INSTRUCTIONS   monitor symptoms    Follow-up:  Return to care in 4 days.

## 2022-01-10 ENCOUNTER — THERAPY VISIT (OUTPATIENT)
Dept: CHIROPRACTIC MEDICINE | Facility: OTHER | Age: 67
End: 2022-01-10
Attending: CHIROPRACTOR
Payer: COMMERCIAL

## 2022-01-10 ENCOUNTER — HOSPITAL ENCOUNTER (OUTPATIENT)
Dept: PHYSICAL THERAPY | Facility: OTHER | Age: 67
Setting detail: THERAPIES SERIES
End: 2022-01-10
Attending: NURSE PRACTITIONER
Payer: COMMERCIAL

## 2022-01-10 VITALS
OXYGEN SATURATION: 98 % | TEMPERATURE: 97.1 F | HEART RATE: 80 BPM | RESPIRATION RATE: 16 BRPM | DIASTOLIC BLOOD PRESSURE: 70 MMHG | SYSTOLIC BLOOD PRESSURE: 124 MMHG

## 2022-01-10 DIAGNOSIS — M99.02 SEGMENTAL AND SOMATIC DYSFUNCTION OF THORACIC REGION: ICD-10-CM

## 2022-01-10 DIAGNOSIS — M99.01 SEGMENTAL AND SOMATIC DYSFUNCTION OF CERVICAL REGION: Primary | ICD-10-CM

## 2022-01-10 DIAGNOSIS — M54.2 DORSALGIA OF CERVICAL REGION: ICD-10-CM

## 2022-01-10 DIAGNOSIS — H81.12 BENIGN PAROXYSMAL POSITIONAL VERTIGO OF LEFT EAR: ICD-10-CM

## 2022-01-10 PROCEDURE — 98940 CHIROPRACT MANJ 1-2 REGIONS: CPT | Mod: AT | Performed by: CHIROPRACTOR

## 2022-01-10 PROCEDURE — 95992 CANALITH REPOSITIONING PROC: CPT | Mod: GP

## 2022-01-10 PROCEDURE — 97112 NEUROMUSCULAR REEDUCATION: CPT | Mod: GP,59

## 2022-01-10 NOTE — PROGRESS NOTES
01/06/22 1600   Quick Adds   Quick Adds Vestibular Eval   Type of Visit Initial OP PT Evaluation   General Information   Start of Care Date 01/06/22   Referring Physician Cherelle Dean NP   Orders Evaluate and Treat as Indicated   Order Date 01/04/21   Medical Diagnosis R42 (ICD-10-CM) - Dizziness   Onset of illness/injury or Date of Surgery 12/21/21   Precautions/Limitations fall precautions   Surgical/Medical history reviewed Yes  (most recent SI fusion 2021, ansiety, thyroid, skin cancer)   Pertinent history of current vestibular problem (include personal factors and/or comorbidities that impact the POC)  Anxiety;Depression   Pertinent history of current problem (include personal factors and/or comorbidities that impact the POC) Shirley went into ED on 12/28 with worsening sx over a week.  Sx worse with getting out of bed in morning or middle of the night but also randomly through day.  Not spinning, but more wavy and off balance, like walking on the moon. First week after SI she was oxycodone and then on muscle relaxor and off all meds by 12/10, and then sx a couple weeks later and wondering if any withdrawl, but also been having sinus drainage. L ear feeling plugged, when it feels worse then she feels more off balance, sometimes all day every day or sometimes every other day.  Blood pressure was really high when in the ER and they put her on a low dose BP med.  Normal again since on generic norvasc, she is checking at home. Nov. had a physical and everything was great. Feel like she's going to faint to the right like she's going to black out. Visual gradually worse since new glasses last summer, no significant vision changes in last couple weeks. Turn fast eyes don't always keep up. L visual field is blurry. She had chiropractive visits before PT today and feels neck is much improved already. Works part-time doing metal crafting jewlry.    Pertinent Visual History  glasses new last summer   Patient  role/Employment history Employed;Retired   Fall Risk Screen   Fall screen completed by PT   Have you fallen 2 or more times in the past year? No   Have you fallen and had an injury in the past year? No   Is patient a fall risk? Yes   Fall screen comments unsteady with vertigo episode   Abuse Screen (yes response referral indicated)   Feels Unsafe at Home or Work/School no   Pain   Patient currently in pain Yes   Pain location neck   Cognitive Status Examination   Orientation orientation to person, place and time   Posture   Posture Normal   Range of Motion (ROM)   ROM Comment limited rotation, ext, side bending at end range with neck soreness, no increase of sx   Cervicogenic Screen   Neck ROM limited SB to 20 deg, rotation to 40 deg, ext tight, flex   Oculomotor Exam   Smooth Pursuit Abnormal   Smooth Pursuit Comment blurry off to L - new   Saccades Abnormal   Saccades Comments hard to focus    VOR Abnormal   VOR Comments makes her dizzy -off   Infrared Goggle Exam or Frenzel Lense Exam   Vestibular Suppressant in Last 24 Hours? No   Exam completed with Room Light   Spontaneous Nystagmus Negative   Gaze Evoked Nystagmus Negative   Rachel-Hallpike (right) Negative   Rachel-Hallpike (Left) Upbeating R torsional   Rachel-Hallpike (left) comments short duration, hard to keep eyes open, hard to determine direction of nystagmus, sx present    Planned Therapy Interventions   Planned Therapy Interventions joint mobilization;balance training;neuromuscular re-education;ROM;strengthening;stretching;manual therapy  (canalith repositioning, vestibular rehab)   Clinical Impression   Criteria for Skilled Therapeutic Interventions Met yes, treatment indicated   PT Diagnosis L BPPV with neck pain and limitation   Influenced by the following impairments vertigo, neck pain, limited ROM   Functional limitations due to impairments limited activity tolerance, limited mobility, limited ADLs   Clinical Presentation Evolving/Changing   Clinical  Presentation Rationale good progress with first treatment   Clinical Decision Making (Complexity) Moderate complexity   Therapy Frequency 2 times/Week   Predicted Duration of Therapy Intervention (days/wks) 8 weeks   Risk & Benefits of therapy have been explained Yes   Patient, Family & other staff in agreement with plan of care Yes   Education Assessment   Barriers to Learning No barriers   GOALS   PT Eval Goals 1;2;3   Goal 1   Goal Identifier positioning   Goal Description Pt able to get up and down from bed or floor without veritgo in 4 weeks.    Target Date 02/03/22   Goal 2   Goal Identifier balance   Goal Description Pt improve stability with h/o SI surgery and vestibular limitations demonstrated by SLS of 30 sec or greater in 8 weeks.   Target Date 03/03/22   Goal 3   Goal Identifier HEP   Goal Description pt following through and independent with progression of HEP for vestibular rehab in 8 weeks.    Target Date 03/03/22   Total Evaluation Time   PT Funmi, Moderate Complexity Minutes (97665) 30

## 2022-01-10 NOTE — PROGRESS NOTES
Neck is constant deep sore. Radiating into left upper back. 3/10 W24 4/10. Has been doing stretches and using an essential oil moist heat pad. These are providing relief.   Shirley Salvador on 1/10/2022 at 11:35 AM    Visit #:  2    Subjective:  Shirley Hollins is a 66 year old female who is seen in f/u up for:        Segmental and somatic dysfunction of cervical region  Segmental and somatic dysfunction of thoracic region  Dorsalgia of cervical region  Benign paroxysmal positional vertigo of left ear.     Since last visit on 1/6/2022,  Shirley Hollins reports: Symptoms showing great improvement after initial visit with both our office and physical therapy.  Patient informed me that on Friday she was relatively symptom-free.  Did have a flareup of symptoms on Saturday.  However by yesterday for symptoms began to decrease.  Dizzy symptoms seem to be subsiding most.  Continues to have some pain with restriction along the cervical and upper thoracic spine.    Has been able to reschedule with physical therapy following today's visit.  Patient still reports plugged sensations of the left ear.      Area of chief complaint:  Cervical and Thoracic :  Symptoms are graded at 3-4/10. The quality is described as deep and sore.       Objective:  The following was observed:  /70 (BP Location: Right arm, Patient Position: Sitting)   Pulse 80   Temp 97.1  F (36.2  C) (Tympanic)   Resp 16   LMP  (LMP Unknown)   SpO2 98%      P: palpatory tenderness left side C1, T4:    A: static palpation demonstrates intersegmental asymmetry , cervical, thoracic  R: motion palpation notes restricted motion, C1  and T4   T: muscle spasm at level(s): Suboccipital musculature left side, splenius muscle group left side, left SCM, left upper trapezius and levator scapula    Segmental spinal dysfunction/restrictions found at:  :  C1 Right rotation restricted and Left lateral flexion restricted  T4 Left lateral flexion restricted and Extension  restriction.      Assessment: Patient showing very good signs of progress.  Anticipate 1-2 additional visits this week prior to patient's departure on an upcoming trip.    Diagnoses:      1. Segmental and somatic dysfunction of cervical region    2. Segmental and somatic dysfunction of thoracic region    3. Dorsalgia of cervical region    4. Benign paroxysmal positional vertigo of left ear        Patient's condition:  Patient had restrictions pre-manipulation and Patient symptoms are gradually improving    Treatment effectiveness:  Post manipulation there is better intersegmental movement, Patient claims to feel looser post manipulation and Symptoms appear to be decreasing      Procedures:  CMT:  16320 Chiropractic manipulative treatment 1-2 regions performed   Cervical: Diversified, C1 , Supine  Thoracic: Diversified, T4, Prone    Modalities:  90697: MSTM:  To Left suboccipitals, left upper trapezius and levator scapula, left splenius muscle group:   for 4 min    Therapeutic procedures:  None  Deferred to physical therapy    Response to Treatment  Reduction in symptoms as reported by patient    Prognosis: Excellent    Progress towards Goals: Patient is making progress towards the goal.   Patient will report improved pain.              Patient will report able to perform transitional movements without aggravation of neck symptoms or dizziness.              Patient will demonstrate an improved ability to complete Activities of Daily Living   as shown by a reported 10-30% reduced score on neck index.               Patient will demonstrate improved ROM.      Recommendations:    Instructions:none    Follow-up:  Return to care in 4 days.

## 2022-01-14 ENCOUNTER — THERAPY VISIT (OUTPATIENT)
Dept: CHIROPRACTIC MEDICINE | Facility: OTHER | Age: 67
End: 2022-01-14
Attending: CHIROPRACTOR
Payer: COMMERCIAL

## 2022-01-14 ENCOUNTER — HOSPITAL ENCOUNTER (OUTPATIENT)
Dept: PHYSICAL THERAPY | Facility: OTHER | Age: 67
Setting detail: THERAPIES SERIES
End: 2022-01-14
Attending: NURSE PRACTITIONER
Payer: COMMERCIAL

## 2022-01-14 VITALS
SYSTOLIC BLOOD PRESSURE: 128 MMHG | HEART RATE: 81 BPM | TEMPERATURE: 98.1 F | RESPIRATION RATE: 16 BRPM | OXYGEN SATURATION: 99 % | DIASTOLIC BLOOD PRESSURE: 78 MMHG

## 2022-01-14 DIAGNOSIS — M99.01 SEGMENTAL AND SOMATIC DYSFUNCTION OF CERVICAL REGION: Primary | ICD-10-CM

## 2022-01-14 DIAGNOSIS — M54.2 DORSALGIA OF CERVICAL REGION: ICD-10-CM

## 2022-01-14 DIAGNOSIS — H81.12 BENIGN PAROXYSMAL POSITIONAL VERTIGO OF LEFT EAR: ICD-10-CM

## 2022-01-14 DIAGNOSIS — M99.02 SEGMENTAL AND SOMATIC DYSFUNCTION OF THORACIC REGION: ICD-10-CM

## 2022-01-14 PROCEDURE — 97112 NEUROMUSCULAR REEDUCATION: CPT | Mod: GP,59

## 2022-01-14 PROCEDURE — 98940 CHIROPRACT MANJ 1-2 REGIONS: CPT | Mod: AT | Performed by: CHIROPRACTOR

## 2022-01-14 PROCEDURE — 95992 CANALITH REPOSITIONING PROC: CPT | Mod: GP

## 2022-01-14 NOTE — PROGRESS NOTES
"Neck left side and upper back is constant sore. 0/10 W24 5/10. Doing stretches, Advil, and pain cream. These are providing some relief.  Shirley Salvador on 1/14/2022 at 11:33 AM    Visit #:  3    Subjective:  Shirley Hollins is a 66 year old female who is seen in f/u up for:        Segmental and somatic dysfunction of cervical region  Segmental and somatic dysfunction of thoracic region  Dorsalgia of cervical region  Benign paroxysmal positional vertigo of left ear.     Since last visit on 1/10/2022,  Shirley Hollins reports: Symptoms seem to be doing much better.  After today's visit patient will be leaving on a trip.  I was able to consult with patient's physical therapist to also conveys that the patient has made good progress through course of care.    Patient is concerned about possible \"inflammation \"in her eustachian tube.    Area of chief complaint:  Cervical/thoracic :  Symptoms are graded at 0-5/10. The quality is described as sore.       Objective:  The following was observed:  /78 (BP Location: Right arm, Patient Position: Sitting)   Pulse 81   Temp 98.1  F (36.7  C) (Tympanic)   Resp 16   LMP  (LMP Unknown)   SpO2 99%      Otoscopic examination-unremarkable. TM no retraction, no erythematous changes, fluid accumulation.    P: palpatory tendernessC1 on left, CT junction:    A: static palpation demonstrates intersegmental asymmetry , cervical, thoracic  R: motion palpation notes restricted motion, C1  and T2   T: muscle spasm at level(s): left SCM, left suboccipitals, left splenius muscle group:      Segmental spinal dysfunction/restrictions found at:  :  C1 Right rotation restricted and Left lateral flexion restricted  T2 Extension restriction.      Assessment: Patient continues to show very good signs of progress.  Patient has been provided with home care measures to help with symptoms.  Patient may still benefit from follow-up care upon her return which will be done on as needed basis at this " time.  Otoscopic examination of patient's auditory canal unremarkable.    Diagnoses:      1. Segmental and somatic dysfunction of cervical region    2. Segmental and somatic dysfunction of thoracic region    3. Dorsalgia of cervical region    4. Benign paroxysmal positional vertigo of left ear        Patient's condition:  Patient had restrictions pre-manipulation and Patient symptoms are gradually improving    Treatment effectiveness:  Post manipulation there is better intersegmental movement, Patient claims to feel looser post manipulation and Symptoms appear to be decreasing      Procedures:  CMT:  08372 Chiropractic manipulative treatment 1-2 regions performed   Cervical: Diversified, C1 , Supine  Thoracic: Diversified, T2, Prone    Modalities:  None performed this visit    Therapeutic procedures:  None  Deferred to PT    Response to Treatment  Reduction in symptoms as reported by patient    Prognosis: Excellent    Progress towards Goals: Patient is making progress towards the goal.   Patient will report improved pain.              Patient will report able to perform transitional movements without aggravation of neck symptoms or dizziness.              Patient will demonstrate an improved ability to complete Activities of Daily Living   as shown by a reported 10-30% reduced score on neck index.               Patient will demonstrate improved ROM.  Recommendations:    Instructions:monitor symptoms, use home care measures as instructed    Follow-up:  Continue treatment PRN.

## 2022-01-16 ENCOUNTER — ALLIED HEALTH/NURSE VISIT (OUTPATIENT)
Dept: FAMILY MEDICINE | Facility: OTHER | Age: 67
End: 2022-01-16
Attending: FAMILY MEDICINE
Payer: COMMERCIAL

## 2022-01-16 DIAGNOSIS — Z20.822 COVID-19 RULED OUT: Primary | ICD-10-CM

## 2022-01-16 PROCEDURE — U0005 INFEC AGEN DETEC AMPLI PROBE: HCPCS | Mod: ZL

## 2022-01-16 PROCEDURE — C9803 HOPD COVID-19 SPEC COLLECT: HCPCS

## 2022-01-17 LAB — SARS-COV-2 RNA RESP QL NAA+PROBE: NEGATIVE

## 2022-02-14 DIAGNOSIS — R03.0 ELEVATED BLOOD PRESSURE READING WITHOUT DIAGNOSIS OF HYPERTENSION: ICD-10-CM

## 2022-02-14 NOTE — TELEPHONE ENCOUNTER
Routing refill request to provider for review/approval because:    LOV: 1/4/2022    Jerri Gardiner RN on 2/14/2022 at 3:32 PM

## 2022-02-15 RX ORDER — AMLODIPINE BESYLATE 2.5 MG/1
TABLET ORAL
Qty: 90 TABLET | Refills: 0 | Status: SHIPPED | OUTPATIENT
Start: 2022-02-15 | End: 2022-05-17

## 2022-05-11 DIAGNOSIS — F41.9 ANXIETY: ICD-10-CM

## 2022-05-11 RX ORDER — CLONAZEPAM 0.5 MG/1
0.25 TABLET ORAL
Qty: 60 TABLET | Refills: 0 | Status: CANCELLED | OUTPATIENT
Start: 2022-05-11

## 2022-05-11 NOTE — TELEPHONE ENCOUNTER
[facility] sent Rx request for the following:      clonazePAM (KLONOPIN) 0.5 MG tablet      Last Prescription Date:   11/23/2021  Last Fill Qty/Refills:         60, R-0    Last Office Visit:              1/4/2022   Future Office visit:           none    Gallo Sewell RN, BSN  ....................  5/12/2022   3:44 PM

## 2022-05-13 NOTE — TELEPHONE ENCOUNTER
Called pt and informed her that prior to refill on klonopin she will need a visit with pcp, she was transferred to scheduling to schedule.  Katelyn Harris RN on 5/13/2022 at 8:59 AM

## 2022-05-14 DIAGNOSIS — R03.0 ELEVATED BLOOD PRESSURE READING WITHOUT DIAGNOSIS OF HYPERTENSION: ICD-10-CM

## 2022-05-17 RX ORDER — AMLODIPINE BESYLATE 2.5 MG/1
TABLET ORAL
Qty: 90 TABLET | Refills: 1 | Status: SHIPPED | OUTPATIENT
Start: 2022-05-17 | End: 2022-08-09

## 2022-05-17 NOTE — TELEPHONE ENCOUNTER
Routing refill request to provider for review/approval because:    LOV: 1/4/22  Jerri Gardiner RN on 5/17/2022 at 3:10 PM

## 2022-05-20 DIAGNOSIS — F41.9 ANXIETY: ICD-10-CM

## 2022-05-20 RX ORDER — CLONAZEPAM 0.5 MG/1
0.25 TABLET ORAL
Qty: 60 TABLET | Refills: 0 | Status: CANCELLED | OUTPATIENT
Start: 2022-05-20

## 2022-05-23 ENCOUNTER — OFFICE VISIT (OUTPATIENT)
Dept: INTERNAL MEDICINE | Facility: OTHER | Age: 67
End: 2022-05-23
Attending: INTERNAL MEDICINE
Payer: COMMERCIAL

## 2022-05-23 VITALS
SYSTOLIC BLOOD PRESSURE: 138 MMHG | OXYGEN SATURATION: 100 % | DIASTOLIC BLOOD PRESSURE: 82 MMHG | HEART RATE: 72 BPM | WEIGHT: 136.4 LBS | RESPIRATION RATE: 16 BRPM | BODY MASS INDEX: 22.7 KG/M2 | TEMPERATURE: 97.8 F

## 2022-05-23 DIAGNOSIS — F41.9 ANXIETY: ICD-10-CM

## 2022-05-23 DIAGNOSIS — L98.9 CHEST SKIN LESION: ICD-10-CM

## 2022-05-23 DIAGNOSIS — R03.0 ELEVATED BLOOD PRESSURE READING WITHOUT DIAGNOSIS OF HYPERTENSION: ICD-10-CM

## 2022-05-23 DIAGNOSIS — E78.5 HYPERLIPIDEMIA, UNSPECIFIED HYPERLIPIDEMIA TYPE: Primary | ICD-10-CM

## 2022-05-23 DIAGNOSIS — M85.88 OSTEOPENIA OF LUMBAR SPINE: ICD-10-CM

## 2022-05-23 DIAGNOSIS — L98.9 ARM SKIN LESION, RIGHT: ICD-10-CM

## 2022-05-23 LAB
CHOLEST SERPL-MCNC: 221 MG/DL
DEPRECATED CALCIDIOL+CALCIFEROL SERPL-MC: 53 UG/L (ref 30–100)
FASTING STATUS PATIENT QL REPORTED: YES
HDLC SERPL-MCNC: 56 MG/DL (ref 23–92)
LDLC SERPL CALC-MCNC: 149 MG/DL
NONHDLC SERPL-MCNC: 165 MG/DL
TRIGL SERPL-MCNC: 78 MG/DL

## 2022-05-23 PROCEDURE — 80061 LIPID PANEL: CPT | Mod: ZL | Performed by: INTERNAL MEDICINE

## 2022-05-23 PROCEDURE — 99215 OFFICE O/P EST HI 40 MIN: CPT | Performed by: INTERNAL MEDICINE

## 2022-05-23 PROCEDURE — 82306 VITAMIN D 25 HYDROXY: CPT | Mod: ZL | Performed by: INTERNAL MEDICINE

## 2022-05-23 PROCEDURE — 36415 COLL VENOUS BLD VENIPUNCTURE: CPT | Mod: ZL | Performed by: INTERNAL MEDICINE

## 2022-05-23 RX ORDER — CLONAZEPAM 0.5 MG/1
0.25 TABLET ORAL
Qty: 60 TABLET | Refills: 5 | Status: SHIPPED | OUTPATIENT
Start: 2022-05-23 | End: 2022-11-23

## 2022-05-23 ASSESSMENT — PAIN SCALES - GENERAL: PAINLEVEL: NO PAIN (0)

## 2022-05-23 NOTE — PATIENT INSTRUCTIONS
For sleep try the following:  Daily exercise, Jake chi and soothing music every evening in additional to the following:    - maintain a routine, go to bed and wake up at the same time every day  - make sure that your bedroom is comfortable, keep your bedroom quiet, dark,comfortable and cool  - include a warm beverage and/or hot bath 1-2 hours before bedtime  - avoid naps  - expose yourself to bright light during the day  - do not watch the clock  - avoid caffeine and nicotine for at least 4-6 hours prior to bed and consider cutting out all caffeine (coffee, chocolate, tea) if able  - avoid alcohol before bedtime   - exercise regularly but avoid exercising right before bed  - avoid looking at illuminated screens (phones, computers, TV) in the 1-2 hours before bedtime.  - If you haven t been able to get to sleep after about 20 minutes or more, get up and do something calming or boring until you feel sleepy, then return to bed and try again. Avoid doing anything that is too stimulating or interesting, as this will wake you up even more.  - use bed only for sleep and sex, do not use it as a place to watch TV, eat, read, work on your laptop, pay bills, and other things  - can trial melatonin 1mg - 5mg nightly 2-3 hours before sleep if you have not in the past; requires taking on a regular/daily basis

## 2022-05-23 NOTE — PROGRESS NOTES
Assessment & Plan     Anxiety  We had a long discussion regarding this medication.  At this time she is encouraged to reduce the use of the medication for sleep.  She can continue to use it sparingly as needed for panic.  She was given several other modalities to help with her sleep.  - clonazePAM (KLONOPIN) 0.5 MG tablet; Take 0.5 tablets (0.25 mg) by mouth nightly as needed for anxiety or sleep - May take 2 tablets if having anxiety attack or difficulty sleeping.    Hyperlipidemia, unspecified hyperlipidemia type  Recheck today and address as indicated  - Lipid Panel    Osteopenia of lumbar spine  Recheck today and restart supplementation if indicated  - Vitamin D Total    Chest skin lesion  Referral placed to general surgery for excisional biopsy  - Adult General Surg Referral; Future    Arm skin lesion, right  - Adult General Surg Referral; Future    Elevated blood pressure reading without diagnosis of hypertension  Blood pressure overall has improved with her activity and weight loss.  Would recommend at this time that she monitor her blood pressure at home and can consider stopping the amlodipine.  Goal blood pressure is less than 135/90    Return in about 6 months (around 11/23/2022) for Annual Review with renewal of all medications.     40 minutes spent on the date of the encounter doing chart review, history and exam, documentation and further activities as noted above      Sonia Mitchell, [facility] AND [facility]   Shirley is a 66 year old who presents for the following health issues: 2 month F/U medications, labs    She has a history of hyperlipidemia.  She has been working hard on exercise and has lost weight.  She is interested in having a recheck of her cholesterol.  She is fasting today.    Her blood pressure overall has looked okay.  She reports that with her increased exercise her blood pressure at home has been in the 110/60 range.  She has continued on amlodipine and is  curious if she still needs this.    She has a couple skin lesions that she would like looked at today and likely removed.    She has known osteopenia.  Last fall she stopped her vitamin D and has been off of it since.  She is interested in seeing this rechecked again.    She has known anxiety.  She does use clonazepam for sleep and panic.  We had a long discussion regarding the use of this medication.  She denies any obvious side effects that she is aware of at this time although has noted some memory issues and possibly some balance issues    History of Present Illness       She eats 4 or more servings of fruits and vegetables daily.She consumes 0 sweetened beverage(s) daily.She exercises with enough effort to increase her heart rate 30 to 60 minutes per day.  She exercises with enough effort to increase her heart rate 5 days per week.   She is taking medications regularly.       Review of Systems   She denies any fevers or chills.  She feels her vertigo has improved slightly.      Objective    /82 (BP Location: Right arm, Patient Position: Sitting, Cuff Size: Adult Regular)   Pulse 72   Temp 97.8  F (36.6  C) (Temporal)   Resp 16   Wt 61.9 kg (136 lb 6.4 oz)   LMP  (LMP Unknown)   SpO2 100%   Breastfeeding No   BMI 22.70 kg/m    Body mass index is 22.7 kg/m .  Physical Exam   GENERAL: healthy, alert and no distress  MS: no gross musculoskeletal defects noted, no edema  SKIN: Slightly erythematous lesion is noted on anterior chest, scaly lesion noted on the right arm.

## 2022-06-21 ENCOUNTER — OFFICE VISIT (OUTPATIENT)
Dept: SURGERY | Facility: OTHER | Age: 67
End: 2022-06-21
Attending: SURGERY
Payer: COMMERCIAL

## 2022-06-21 VITALS
TEMPERATURE: 97.1 F | HEART RATE: 70 BPM | WEIGHT: 140 LBS | HEIGHT: 62 IN | BODY MASS INDEX: 25.76 KG/M2 | RESPIRATION RATE: 16 BRPM | DIASTOLIC BLOOD PRESSURE: 70 MMHG | OXYGEN SATURATION: 100 % | SYSTOLIC BLOOD PRESSURE: 126 MMHG

## 2022-06-21 DIAGNOSIS — L98.9 ARM SKIN LESION, RIGHT: ICD-10-CM

## 2022-06-21 DIAGNOSIS — L98.9 CHEST SKIN LESION: ICD-10-CM

## 2022-06-21 PROCEDURE — 11403 EXC TR-EXT B9+MARG 2.1-3CM: CPT | Performed by: SURGERY

## 2022-06-21 PROCEDURE — 11402 EXC TR-EXT B9+MARG 1.1-2 CM: CPT | Performed by: SURGERY

## 2022-06-21 PROCEDURE — 88305 TISSUE EXAM BY PATHOLOGIST: CPT

## 2022-06-21 ASSESSMENT — PAIN SCALES - GENERAL: PAINLEVEL: NO PAIN (0)

## 2022-06-21 NOTE — NURSING NOTE
"Chief Complaint   Patient presents with     Lesion Removal     Chest and right arm by elbow       Initial /70   Pulse 70   Temp 97.1  F (36.2  C) (Tympanic)   Resp 16   Ht 1.562 m (5' 1.5\")   Wt 63.5 kg (140 lb)   LMP  (LMP Unknown)   SpO2 100%   BMI 26.02 kg/m   Estimated body mass index is 26.02 kg/m  as calculated from the following:    Height as of this encounter: 1.562 m (5' 1.5\").    Weight as of this encounter: 63.5 kg (140 lb).  Medication Reconciliation: complete    FOOD SECURITY SCREENING QUESTIONS  Hunger Vital Signs:  Within the past 12 months we worried whether our food would run out before we got money to buy more. Never  Within the past 12 months the food we bought just didn't last and we didn't have money to get more. Never  Lucie Charles LPN 6/21/2022 9:03 AM      "

## 2022-06-21 NOTE — PROGRESS NOTES
Procedure Note      Pre/Post Operative Diagnosis:  Chest skin lesion, right elbow skin lesion     Procedure:   Removal of chest skin lesion, right elbow skin lesion     Surgeon: Omar West MD    Local Anesthesia: 1% lidocaine with 0.25% Marcaine with epinephrine    Indication for the procedure:  This is a 66 year old female patient referred by Sonia Mitchell with a Chest skin lesion, right elbow skin lesion.       After explaining the risks to include bleeding, infection, recurrence or need for reexcision, and scarring the patientwished to proceed.    Procedure:   The sternal area was prepped and draped in usual sterile fashion with ChloraPrep.   After, adequate localanesthesia, an 3 cm X 1 cm elliptical skin incision was made to encompass the  0.8 cm lesion with margins.  The skin was closed with 4-0 Monocryl and Dermabond.      The right elbow was prepped and anesthestized.  A 1.5 cm X 0.5 cm excision was used to remove a 0.4 cm lesion. This was closed with 4-0 Monocryl and glue.     Plan:  The patient will be called to review the pathology. Patient will follow up if there any problems with the wound including redness or drainage.      Omar West MD....................  6/21/2022   9:29 AM

## 2022-06-27 LAB
PATH REPORT.COMMENTS IMP SPEC: NORMAL
PATH REPORT.FINAL DX SPEC: NORMAL
PHOTO IMAGE: NORMAL

## 2022-06-28 ENCOUNTER — TELEPHONE (OUTPATIENT)
Dept: SURGERY | Facility: OTHER | Age: 67
End: 2022-06-28

## 2022-08-01 ENCOUNTER — NURSE TRIAGE (OUTPATIENT)
Dept: INTERNAL MEDICINE | Facility: OTHER | Age: 67
End: 2022-08-01

## 2022-08-01 DIAGNOSIS — R42 DIZZINESS: ICD-10-CM

## 2022-08-01 DIAGNOSIS — R42 VERTIGO: Primary | ICD-10-CM

## 2022-08-01 NOTE — TELEPHONE ENCOUNTER
S-(situation): patient having dizziness    B-(background): patient has a history of vertigo.     A-(assessment): patient states has been having symptoms since April, went on a trip to [place] in the mountains and thought it was elevation sickness but has been home 9 days now and its not getting any better.  States had taken one meclizine a day this weekend and it did seem to help a little but not much.  States tried a maneuver for vertigo but it did not help .  States had noticed some vision change so had went to eye doctor last Tuesday and only a slight change was noted in distance vision.  Patient states room is tilting and present now , increases with any movement, standing.  Patient states having left ear pain and nausea.    R-(recommendations): patient advised message will be routed to DR. Mitchell for review and consideration.  Second level triage.  Patient advised any change or concern in meantime patient is to present to ED.  Patient verbalized understanding.    Jerri Gardiner RN on 8/1/2022 at 10:29 AM    Reason for Disposition    Spinning or tilting sensation (vertigo) present now and one or more stroke risk factors (i.e., hypertension, diabetes, prior stroke/TIA, heart attack, age over 60) (Exception: prior physician evaluation for this AND no different/worse than usual)    Additional Information    Negative: Shock suspected (e.g., cold/pale/clammy skin, too weak to stand, low BP, rapid pulse)    Negative: Difficult to awaken or acting confused (e.g., disoriented, slurred speech)    Negative: Fainted, and still feels dizzy afterwards    Negative: Severe difficulty breathing (e.g., struggling for each breath, speaks in single words)    Negative: Overdose (accidental or intentional) of medications    Negative: New neurologic deficit that is present now: * Weakness of the face, arm, or leg on one side of the body * Numbness of the face, arm, or leg on one side of the body * Loss of speech or garbled speech     "Negative: Heart beating < 50 beats per minute OR > 140 beats per minute    Negative: Sounds like a life-threatening emergency to the triager    Negative: Chest pain    Negative: Rectal bleeding, bloody stool, or tarry-black stool    Negative: Vomiting is the main symptom    Negative: Diarrhea is the main symptom    Negative: Headache is the main symptom    Negative: Heat exhaustion suspected (i.e., dehydration from heat exposure)    Negative: Patient states that he/she is having an anxiety/panic attack    Negative: SEVERE dizziness (e.g., unable to stand, requires support to walk, feels like passing out now)    Negative: SEVERE headache or neck pain    Answer Assessment - Initial Assessment Questions  1. DESCRIPTION: \"Describe your dizziness.\"      Left ear plugged  2. LIGHTHEADED: \"Do you feel lightheaded?\" (e.g., somewhat faint, woozy, weak upon standing)      Somewhat faint,woozy, weak upon standing  3. VERTIGO: \"Do you feel like either you or the room is spinning or tilting?\" (i.e. vertigo)      tilting  4. SEVERITY: \"How bad is it?\"  \"Do you feel like you are going to faint?\" \"Can you stand and walk?\"    - MILD - walking normally    - MODERATE - interferes with normal activities (e.g., work, school)     - SEVERE - unable to stand, requires support to walk, feels like passing out now.       Moderate any fast movements   5. ONSET:  \"When did the dizziness begin?\"      April , worse when up in mountains 9 days ago  6. AGGRAVATING FACTORS: \"Does anything make it worse?\" (e.g., standing, change in head position)      Fast movements , standing  7. HEART RATE: \"Can you tell me your heart rate?\" \"How many beats in 15 seconds?\"  (Note: not all patients can do this)        P: 68-70  8. CAUSE: \"What do you think is causing the dizziness?\"      ? elevation  9. RECURRENT SYMPTOM: \"Have you had dizziness before?\" If so, ask: \"When was the last time?\" \"What happened that time?\"      Yes but this time more severe  10. OTHER " "SYMPTOMS: \"Do you have any other symptoms?\" (e.g., fever, chest pain, vomiting, diarrhea, bleeding)        Nausea, unsure fever,  11. PREGNANCY: \"Is there any chance you are pregnant?\" \"When was your last menstrual period?\"        n/a    Protocols used: DIZZINESS-A-OH    "

## 2022-08-01 NOTE — TELEPHONE ENCOUNTER
Recommend patient be seen in clinic/RC today for ear pain as this may be making the vertigo worse if there is an ear infection.  She may need PT and this can be ordered at her visit also if felt necessary

## 2022-08-01 NOTE — TELEPHONE ENCOUNTER
Provider Recommendation Follow Up:   Reached patient/caregiver. Informed of provider's recommendations. Patient verbalized understanding and agrees with the plan.       Jerri Gardiner RN on 8/1/2022 at 10:39 AM

## 2022-08-04 ENCOUNTER — TELEPHONE (OUTPATIENT)
Dept: SURGERY | Facility: OTHER | Age: 67
End: 2022-08-04

## 2022-08-04 NOTE — TELEPHONE ENCOUNTER
SPO-patient states that she has metal in her hand and is asking if she needs a referral to see SPO to have it taken out    Please call and advise  Thank You    Krysta Frost on 8/4/2022 at 9:38 AM

## 2022-08-04 NOTE — TELEPHONE ENCOUNTER
Scheduled with Dr. West for a foreign body removal.  Jade Guillaume LPN..........8/4/2022  10:33 AM

## 2022-08-09 ENCOUNTER — OFFICE VISIT (OUTPATIENT)
Dept: SURGERY | Facility: OTHER | Age: 67
End: 2022-08-09
Attending: SURGERY
Payer: COMMERCIAL

## 2022-08-09 VITALS
SYSTOLIC BLOOD PRESSURE: 130 MMHG | OXYGEN SATURATION: 99 % | BODY MASS INDEX: 26.12 KG/M2 | TEMPERATURE: 97.2 F | RESPIRATION RATE: 18 BRPM | DIASTOLIC BLOOD PRESSURE: 66 MMHG | WEIGHT: 140.5 LBS | HEART RATE: 69 BPM

## 2022-08-09 DIAGNOSIS — S61.221A LACERATION OF LEFT INDEX FINGER WITH FOREIGN BODY WITHOUT DAMAGE TO NAIL, INITIAL ENCOUNTER: Primary | ICD-10-CM

## 2022-08-09 PROCEDURE — 10120 INC&RMVL FB SUBQ TISS SMPL: CPT | Performed by: SURGERY

## 2022-08-09 ASSESSMENT — PAIN SCALES - GENERAL: PAINLEVEL: MODERATE PAIN (4)

## 2022-08-09 NOTE — NURSING NOTE
Prior to the start of the procedure and with procedural staff participation, I verbally confirmed the patient s identity using two indicators, relevant allergies, that the procedure was appropriate and matched the consent or emergent situation, and that the correct equipment/implants were available. Immediately prior to starting the procedure I conducted the Time Out with the procedural staff and re-confirmed the patient s name, procedure, and site/side. (The Joint Commission universal protocol was followed.)  Yes    Sedation (Moderate or Deep): None  Umu Wolfe LPN............8/9/2022 9:25 AM

## 2022-08-09 NOTE — NURSING NOTE
Patient presents to clinic for procedure to remove metal sliver embedded in end of Left pointer finger for the past year.    Medication Rec Complete  Umu Wolfe LPN............8/9/2022 9:04 AM

## 2022-08-09 NOTE — PROGRESS NOTES
Procedure Note      Pre/Post Operative Diagnosis:   Left second finger foreign body    Procedure:   Removal of left second finger foreign body    Surgeon: Omar West MD    Local Anesthesia: 1% lidocaine with 0.25% Marcaine with epinephrine    Indication for the procedure:  This is a 66 year old female  patient referred by Sonia Mitchell with a foreign body in the left second finger.       After explaining the risks to include bleeding, infection, recurrence or need for reexcision, and scarring the patient wished to proceed.    Procedure:   The area was prepped and draped in usual sterile fashion with ChloraPrep.   After, adequate localanesthesia, an 7 mm x2 mm elliptical skin incision was made to encompass the area overlying the suspected foreign body.  Incidentally this is in the same area that she had a small laceration recently.  The metallic foreign body was removed.  The remainder the wound was explored to her ensure no additional foreign material.  The skin was closed with 4-0 nylon and Dermabond.      Plan: Patient will follow up if there any problems with the wound including redness or drainage.      Omar West MD....................  8/9/2022   9:36 AM

## 2022-08-30 ENCOUNTER — OFFICE VISIT (OUTPATIENT)
Dept: OTOLARYNGOLOGY | Facility: OTHER | Age: 67
End: 2022-08-30
Attending: OTOLARYNGOLOGY
Payer: COMMERCIAL

## 2022-08-30 DIAGNOSIS — R42 DYSEQUILIBRIUM: Primary | ICD-10-CM

## 2022-08-30 PROCEDURE — G0463 HOSPITAL OUTPT CLINIC VISIT: HCPCS

## 2022-09-01 NOTE — PROGRESS NOTES
document embedded image  Patient Name: Shirley Hollins    Address: [address] [place] [address]     YOB: 1955    [place] [id number]    MR Number: [id number]    Phone: [phone]  PCP: Sonia Mitchell MD            Appointment Date: 22   Visit Provider: Farhan Ugarte MD    cc: Sonia Mitchell MD; ~    ENT Progress Note  Intake  Visit Reasons: Vertigo    HPI  History of Present Illness  Chief complaint:  Disequilibrium    History  The patient is a 66-year-old female who was sent to the office today with a diagnosis of recurring episodes of vertigo.  After discussing the situation with the patient she clearly is not suffering vertiginous attacks.  Her episodes are more consistent with disequilibrium and a sense of imbalance.  It is episodic in nature.  She seems to having motion intolerance.  She feels some stuffiness in her left ear.  She wonders if her left ear hearing is down.    Exam  The external auditory canals and TMs are clear bilaterally  There are no obvious focal cranial nerve deficits  She has a normal gait and station  There is no spontaneous or gaze induced nystagmus  Audiogram-symmetric normal hearing she has some blunting of her left tympanogram suggesting some mild eustachian tube dysfunction.    Allergies    No Known Allergies Allergy (Verified 22 10:45)    PFSH  PFSH:     Past Medical History: (Updated 22 @ 10:45 by Denisse Gardiner, Med Assist)    Dizziness  Ear pressure  Headache  History of cancer of gall bladder  Skin cancer  Thyroid disorder    Past Surgical History: (Reviewed 22 @ 10:45 by Denisse Gardiner Med Assist)    History of   History of hysterectomy  History of tonsillectomy     Family History: (Updated 22 @ 10:46 by Denisse Gardiner Med Assist)  Mother  Diabetes mellitus  Uncle - Maternal  Diabetes mellitus  Father     No problems noted.    Grandmother - Maternal  Lung cancer  Grandfather - Maternal  Lung  cancer  Other  Hypertension       Social History: (Reviewed 08/31/22 @ 10:46 by Denisse Gardiner, Med Assist)  Smoking Status:  Former smoker  second hand exposure:  No  alcohol intake:  current alcohol intake frequency: a few times a month Alcohol type: wine  substance use type:  former substance user       A&P  Assessment & Plan  (1) Disequilibrium:        Status: Acute        Code(s):  R42 - Dizziness and giddiness  I can not find a peripheral source for her symptoms.  I would be concerned of a more central phenomenon.  She was encouraged to follow up with a neurologist to make sure they feel further workup is not warranted.      Farhan Ugarte MD    08/30/22 0842    <Electronically signed by Farhan Ugarte MD> 08/31/22 7541

## 2022-09-11 ENCOUNTER — HEALTH MAINTENANCE LETTER (OUTPATIENT)
Age: 67
End: 2022-09-11

## 2022-11-23 ENCOUNTER — OFFICE VISIT (OUTPATIENT)
Dept: INTERNAL MEDICINE | Facility: OTHER | Age: 67
End: 2022-11-23
Attending: INTERNAL MEDICINE
Payer: COMMERCIAL

## 2022-11-23 VITALS
WEIGHT: 139 LBS | HEART RATE: 74 BPM | SYSTOLIC BLOOD PRESSURE: 126 MMHG | TEMPERATURE: 97.3 F | HEIGHT: 64 IN | DIASTOLIC BLOOD PRESSURE: 78 MMHG | RESPIRATION RATE: 16 BRPM | BODY MASS INDEX: 23.73 KG/M2 | OXYGEN SATURATION: 100 %

## 2022-11-23 DIAGNOSIS — R22.31 MASS OF FINGER OF RIGHT HAND: Primary | ICD-10-CM

## 2022-11-23 DIAGNOSIS — F41.9 ANXIETY: ICD-10-CM

## 2022-11-23 DIAGNOSIS — E03.9 HYPOTHYROIDISM, UNSPECIFIED TYPE: ICD-10-CM

## 2022-11-23 DIAGNOSIS — E78.5 HYPERLIPIDEMIA, UNSPECIFIED HYPERLIPIDEMIA TYPE: ICD-10-CM

## 2022-11-23 DIAGNOSIS — Z13.1 SCREENING FOR DIABETES MELLITUS: ICD-10-CM

## 2022-11-23 DIAGNOSIS — Z23 NEED FOR INFLUENZA VACCINATION: ICD-10-CM

## 2022-11-23 LAB
ALBUMIN SERPL BCG-MCNC: 4.9 G/DL (ref 3.5–5.2)
ALP SERPL-CCNC: 83 U/L (ref 35–104)
ALT SERPL W P-5'-P-CCNC: 17 U/L (ref 10–35)
ANION GAP SERPL CALCULATED.3IONS-SCNC: 9 MMOL/L (ref 7–15)
AST SERPL W P-5'-P-CCNC: 20 U/L (ref 10–35)
BILIRUB SERPL-MCNC: 0.3 MG/DL
BUN SERPL-MCNC: 16 MG/DL (ref 8–23)
CALCIUM SERPL-MCNC: 9.9 MG/DL (ref 8.8–10.2)
CHLORIDE SERPL-SCNC: 102 MMOL/L (ref 98–107)
CHOLEST SERPL-MCNC: 269 MG/DL
CREAT SERPL-MCNC: 0.65 MG/DL (ref 0.51–0.95)
DEPRECATED HCO3 PLAS-SCNC: 29 MMOL/L (ref 22–29)
ERYTHROCYTE [DISTWIDTH] IN BLOOD BY AUTOMATED COUNT: 12.4 % (ref 10–15)
GFR SERPL CREATININE-BSD FRML MDRD: >90 ML/MIN/1.73M2
GLUCOSE SERPL-MCNC: 101 MG/DL (ref 70–99)
HCT VFR BLD AUTO: 44.3 % (ref 35–47)
HDLC SERPL-MCNC: 60 MG/DL
HGB BLD-MCNC: 15.1 G/DL (ref 11.7–15.7)
LDLC SERPL CALC-MCNC: 188 MG/DL
MCH RBC QN AUTO: 32 PG (ref 26.5–33)
MCHC RBC AUTO-ENTMCNC: 34.1 G/DL (ref 31.5–36.5)
MCV RBC AUTO: 94 FL (ref 78–100)
NONHDLC SERPL-MCNC: 209 MG/DL
PLATELET # BLD AUTO: 262 10E3/UL (ref 150–450)
POTASSIUM SERPL-SCNC: 4.6 MMOL/L (ref 3.4–5.3)
PROT SERPL-MCNC: 7 G/DL (ref 6.4–8.3)
RBC # BLD AUTO: 4.72 10E6/UL (ref 3.8–5.2)
SODIUM SERPL-SCNC: 140 MMOL/L (ref 136–145)
T4 FREE SERPL-MCNC: 1.68 NG/DL (ref 0.9–1.7)
TRIGL SERPL-MCNC: 103 MG/DL
TSH SERPL DL<=0.005 MIU/L-ACNC: 0.14 UIU/ML (ref 0.3–4.2)
WBC # BLD AUTO: 4.9 10E3/UL (ref 4–11)

## 2022-11-23 PROCEDURE — 84439 ASSAY OF FREE THYROXINE: CPT | Mod: ZL | Performed by: INTERNAL MEDICINE

## 2022-11-23 PROCEDURE — 36415 COLL VENOUS BLD VENIPUNCTURE: CPT | Mod: ZL | Performed by: INTERNAL MEDICINE

## 2022-11-23 PROCEDURE — 80053 COMPREHEN METABOLIC PANEL: CPT | Mod: ZL | Performed by: INTERNAL MEDICINE

## 2022-11-23 PROCEDURE — 80061 LIPID PANEL: CPT | Mod: ZL | Performed by: INTERNAL MEDICINE

## 2022-11-23 PROCEDURE — 90682 RIV4 VACC RECOMBINANT DNA IM: CPT | Performed by: INTERNAL MEDICINE

## 2022-11-23 PROCEDURE — 99396 PREV VISIT EST AGE 40-64: CPT | Mod: 25 | Performed by: INTERNAL MEDICINE

## 2022-11-23 PROCEDURE — 90471 IMMUNIZATION ADMIN: CPT | Performed by: INTERNAL MEDICINE

## 2022-11-23 PROCEDURE — 85027 COMPLETE CBC AUTOMATED: CPT | Mod: ZL | Performed by: INTERNAL MEDICINE

## 2022-11-23 PROCEDURE — 84443 ASSAY THYROID STIM HORMONE: CPT | Mod: ZL | Performed by: INTERNAL MEDICINE

## 2022-11-23 RX ORDER — CLONAZEPAM 0.5 MG/1
0.25 TABLET ORAL
Qty: 30 TABLET | Refills: 5 | Status: SHIPPED | OUTPATIENT
Start: 2022-11-23 | End: 2023-10-19

## 2022-11-23 RX ORDER — LEVOTHYROXINE SODIUM 112 UG/1
112 TABLET ORAL DAILY
Qty: 90 TABLET | Refills: 4 | Status: SHIPPED | OUTPATIENT
Start: 2022-11-23 | End: 2023-01-26

## 2022-11-23 RX ORDER — CITALOPRAM HYDROBROMIDE 10 MG/1
10 TABLET ORAL DAILY
Qty: 90 TABLET | Refills: 4 | Status: SHIPPED | OUTPATIENT
Start: 2022-11-23 | End: 2023-06-07

## 2022-11-23 ASSESSMENT — ENCOUNTER SYMPTOMS
COUGH: 0
DIARRHEA: 0
JOINT SWELLING: 0
CONSTIPATION: 0
WEAKNESS: 0
DIZZINESS: 0
SHORTNESS OF BREATH: 0
DYSURIA: 0
HEARTBURN: 1
HEMATURIA: 0
FEVER: 0
FREQUENCY: 1
PALPITATIONS: 0
PARESTHESIAS: 0
EYE PAIN: 0
ARTHRALGIAS: 0
BREAST MASS: 0
HEMATOCHEZIA: 0
CHILLS: 0
HEADACHES: 0
NAUSEA: 0
SORE THROAT: 0
NERVOUS/ANXIOUS: 1
ABDOMINAL PAIN: 0

## 2022-11-23 NOTE — PROGRESS NOTES
SUBJECTIVE:   CC: Shirley is an 66 year old who presents for preventive health visit.     She is doing well.  She has a history of hypothyroidism and is on levothyroxine.  She denies any obvious symptoms of high or low thyroid.    She has developed a lump on her first finger PIP joint on the right.  She does feel it has been growing.    She has a history of hyperlipidemia.  She has been working on regular exercise and weight.  She would like to have this rechecked today.  She is also due for diabetes screening.    She has a history of anxiety.  She has been trying to decrease her clonazepam use however from when she decreased that she had significant panic attacks.  She is still currently taking this twice daily as needed although typically only takes the second dose handful of times per month.   she is interested in possibly trying something else to help control her anxiety.    She would like her flu shot.    Patient has been advised of split billing requirements and indicates understanding: Yes     Healthy Habits:    Taking medications regularly:  0    PHQ-2 Total Score:  History of Present Illness       Reason for visit:  Annual wellness exam    She eats 2-3 servings of fruits and vegetables daily.She consumes 0 sweetened beverage(s) daily.She exercises with enough effort to increase her heart rate 30 to 60 minutes per day.  She exercises with enough effort to increase her heart rate 5 days per week.   She is taking medications regularly.      Today's PHQ-2 Score:   PHQ-2 ( 1999 Pf[employer]) 5/23/2022   Q1: Little interest or pleasure in doing things 0   Q2: Feeling down, depressed or hopeless 0   PHQ-2 Score 0   PHQ-2 Total Score (12-17 Years)- Positive if 3 or more points; Administer PHQ-A if positive -   Q1: Little interest or pleasure in doing things -   Q2: Feeling down, depressed or hopeless -   PHQ-2 Score -           Social History     Tobacco Use     Smoking status: Former     Packs/day: 0.15     Years:  20.00     Pack years: 3.00     Types: Cigarettes     Quit date: 2005     Years since quittin.9     Smokeless tobacco: Never   Substance Use Topics     Alcohol use: Yes     Comment: Occasional         No flowsheet data found.    Reviewed orders with patient.  Reviewed health maintenance and updated orders accordingly - Yes    Breast Cancer Screening:    FHS-7:   Breast CA Risk Assessment (FHS-7) 2021   Did any of your first-degree relatives have breast or ovarian cancer? No   Did any of your relatives have bilateral breast cancer? No   Did any man in your family have breast cancer? No   Did any woman in your family have breast cancer before age 50 y? No   Do you have 2 or more relatives with breast and/or ovarian cancer? No   Do you have 2 or more relatives with breast and/or bowel cancer? No       Mammogram Screening: Recommended annual mammography  Pertinent mammograms are reviewed under the imaging tab.    History of abnormal Pap smear: Status post benign hysterectomy. Health Maintenance and Surgical History updated.     Reviewed and updated as needed this visit by clinical staff   Tobacco  Allergies  Meds              Reviewed and updated as needed this visit by Provider                 Current Outpatient Medications   Medication     citalopram (CELEXA) 10 MG tablet     clonazePAM (KLONOPIN) 0.5 MG tablet     famotidine (PEPCID) 40 MG tablet     levothyroxine (SYNTHROID/LEVOTHROID) 112 MCG tablet     NEW MED     No current facility-administered medications for this visit.         Review of Systems   Constitutional: Negative for chills and fever.   HENT: Negative for congestion, ear pain, hearing loss and sore throat.    Eyes: Negative for pain and visual disturbance.   Respiratory: Negative for cough and shortness of breath.    Cardiovascular: Negative for chest pain, palpitations and peripheral edema.   Gastrointestinal: Positive for heartburn. Negative for abdominal pain, constipation,  "diarrhea, hematochezia and nausea.        Off of famotidine   Breasts:  Negative for tenderness and breast mass.   Genitourinary: Positive for frequency. Negative for dysuria, genital sores, hematuria, pelvic pain, urgency, vaginal bleeding and vaginal discharge.        Chronic    Musculoskeletal: Negative for arthralgias and joint swelling.   Skin: Negative for rash.        Skin lesion behind left ear   Neurological: Negative for dizziness, weakness, headaches and paresthesias.   Psychiatric/Behavioral: Negative for mood changes. The patient is nervous/anxious.         OBJECTIVE:   /78 (BP Location: Right arm, Patient Position: Sitting, Cuff Size: Adult Large)   Pulse 74   Temp 97.3  F (36.3  C) (Temporal)   Resp 16   Ht 1.636 m (5' 4.4\")   Wt 63 kg (139 lb)   LMP  (LMP Unknown)   SpO2 100%   BMI 23.56 kg/m    Physical Exam  GEN: Vitals reviewed. Healthy appearing. Patient is in no acute distress. Cooperative with exam.  HEENT: Normocephalic atraumatic.  Eyes grossly normal to inspection.  No discharge or erythema, or obvious scleral/conjunctival abnormalities.   NECK: Supple; no thyromegaly or masses noted.  No cervical or supraclavicular lymphadenopathy.  CV: Heart regular in rate and rhythm with no murmur.    LUNGS: No audible wheeze, cough, or visible cyanosis.  No visible retractions or increased work of breathing.  Lungs clear to auscultation bilaterally.    ABD:  Nondistended  SKIN: Warm and dry to touch.  Visible skin clear. No significant rash, abnormal pigmentation or lesions.  EXT: No clubbing or cyanosis.  No peripheral edema.  NEURO: Alert and oriented to person, place, and time.  Cranial nerves II-XII grossly intact with no focal or lateralizing deficits.  Muscle tone normal.  Gait normal. No tremor.   MSK: ROM of upper and lower ext symmetric and full.  PSYCH: Mood is good.  Mentation appears normal, affect normal/bright, judgement and insight intact, normal speech and appearance " well-groomed.      Diagnostic Test Results:  Labs reviewed in Epic    ASSESSMENT/PLAN:   1. Mass of finger of right hand  - referral placed for evaluation  - Orthopedic  Referral; Future    2. Hypothyroidism, unspecified type  - TSH slightly low, T4 normal.  Continue current regimen, recheck in 2-3 months.    - levothyroxine (SYNTHROID/LEVOTHROID) 112 MCG tablet; Take 1 tablet (112 mcg) by mouth daily  Dispense: 90 tablet; Refill: 4  - TSH Reflex GH; Future  - TSH Reflex [facility]  - T4 free    3. Anxiety  - will try low dose citalopram, taper clonazepam as able  - clonazePAM (KLONOPIN) 0.5 MG tablet; Take 0.5 tablets (0.25 mg) by mouth nightly as needed for anxiety or sleep - May take 2 tablets if having anxiety attack or difficulty sleeping.  Dispense: 30 tablet; Refill: 5  - CBC with platelets  - citalopram (CELEXA) 10 MG tablet; Take 1 tablet (10 mg) by mouth daily  Dispense: 90 tablet; Refill: 4    4. Hyperlipidemia, unspecified hyperlipidemia type  - recheck today with dietary/exercise changes  - Lipid Profile    5. Screening for diabetes mellitus  - Comprehensive metabolic panel    6. Need for influenza vaccination  Given today      COUNSELING:  Reviewed preventive health counseling, as reflected in patient instructions        She reports that she quit smoking about 17 years ago. Her smoking use included cigarettes. She has a 3.00 pack-year smoking history. She has never used smokeless tobacco.    Soina Mitchell, [facility]

## 2022-11-23 NOTE — NURSING NOTE
Patient presents to clinic today for annual physical.   Medication reconciliation completed.    ACP on file? No, form previously provided.     FOOD SECURITY SCREENING QUESTIONS    The next two questions are to help us understand your food security.  If you are feeling you need any assistance in this area, we have resources available to support you today.    Hunger Vital Signs:  Within the past 12 months we worried whether our food would run out before we got money to buy more. Never  Within the past 12 months the food we bought just didn't last and we didn't have money to get more. Never    Nolvia Lambert CMA([facility])..................11/23/2022   10:17 AM     Immunization Documentation    Prior to Flublok Immunization administration, verified patients identity using patient's name and date of birth. Please see IMMUNIZATIONS  and order for additional information.  Patient / Parent instructed to remain in clinic for 15 minutes and report any adverse reaction to staff immediately.    Was entire vial of medication used? Yes  Vial/Syringe: Syringe

## 2022-11-25 ENCOUNTER — HOSPITAL ENCOUNTER (OUTPATIENT)
Dept: MAMMOGRAPHY | Facility: OTHER | Age: 67
Discharge: HOME | End: 2022-11-25
Attending: INTERNAL MEDICINE | Admitting: INTERNAL MEDICINE
Payer: COMMERCIAL

## 2022-11-25 DIAGNOSIS — Z12.31 VISIT FOR SCREENING MAMMOGRAM: ICD-10-CM

## 2022-11-25 PROCEDURE — 77067 SCR MAMMO BI INCL CAD: CPT

## 2022-11-29 ENCOUNTER — DOCUMENTATION ONLY (OUTPATIENT)
Dept: OTHER | Facility: CLINIC | Age: 67
End: 2022-11-29

## 2022-12-30 DIAGNOSIS — R22.31 FINGER MASS, RIGHT: Primary | ICD-10-CM

## 2023-01-06 ENCOUNTER — OFFICE VISIT (OUTPATIENT)
Dept: ORTHOPEDICS | Facility: OTHER | Age: 68
End: 2023-01-06
Attending: INTERNAL MEDICINE
Payer: MEDICARE

## 2023-01-06 ENCOUNTER — HOSPITAL ENCOUNTER (OUTPATIENT)
Dept: GENERAL RADIOLOGY | Facility: OTHER | Age: 68
Discharge: HOME | End: 2023-01-06
Attending: SPECIALIST
Payer: MEDICARE

## 2023-01-06 VITALS — OXYGEN SATURATION: 98 % | HEART RATE: 77 BPM

## 2023-01-06 DIAGNOSIS — R22.31 MASS OF FINGER OF RIGHT HAND: ICD-10-CM

## 2023-01-06 DIAGNOSIS — R22.31 FINGER MASS, RIGHT: ICD-10-CM

## 2023-01-06 PROCEDURE — 99203 OFFICE O/P NEW LOW 30 MIN: CPT | Performed by: SPECIALIST

## 2023-01-06 PROCEDURE — G0463 HOSPITAL OUTPT CLINIC VISIT: HCPCS

## 2023-01-06 PROCEDURE — 73140 X-RAY EXAM OF FINGER(S): CPT | Mod: RT

## 2023-01-06 NOTE — PROGRESS NOTES
Visit Date: 2023    HISTORY OF PRESENT ILLNESS:  Steph Hollins is a 67-year-old right hand dominant female I am seeing today for a mass of the dorsal aspect of the right index finger.  She noted this initially towards the end of the summer.  She has some mild discomfort periodically.  The patient is seen today for evaluation of this small soft tissue mass.  It aches occasionally.    PHYSICAL EXAMINATION:  Examination today reveals a 67-year-old female, alert and oriented x3, and appropriate.  Gait and station are appropriate.  She is well groomed and well kempt.  Examination of both upper extremities reveals full and symmetric range of motion of elbows, wrists.  Examination of both hands shows a soft tissue mass over the dorsal aspect of the index finger.  This is freely mobile.  There is no evidence of discoloration.  No evidence of neuritic symptoms.    IMAGING:  Plain film radiographs of the right index finger show uniform mineralization.  She does show some degenerative changes of the PIP joint with soft tissue mass overlying this.    IMPRESSION:  Soft tissue mass, right index finger.    PLAN:  We discussed options at length.  We discussed elective excision if her symptoms would warrant it.  We discussed potential etiologies.  At this point, she would like to leave things as they are.  I will happy to see her back at any time should her symptoms warrant it.    Tae Ulloa MD        D: 2023   T: 2023   MT: ESTEVAN    Name:     STEPH HOLLINS  MRN:      [id number]        Account:    [id number]   :      1955           Visit Date: 2023     Document: [id number]

## 2023-01-06 NOTE — PROGRESS NOTES
Patient is here for consult on her right index finger mass.  Birdie Vizcarra LPN .....................1/6/2023 9:55 AM

## 2023-01-25 ENCOUNTER — LAB (OUTPATIENT)
Dept: LAB | Facility: OTHER | Age: 68
End: 2023-01-25
Attending: INTERNAL MEDICINE
Payer: MEDICARE

## 2023-01-25 ENCOUNTER — MYC MEDICAL ADVICE (OUTPATIENT)
Dept: INTERNAL MEDICINE | Facility: OTHER | Age: 68
End: 2023-01-25

## 2023-01-25 DIAGNOSIS — E03.9 HYPOTHYROIDISM, UNSPECIFIED TYPE: ICD-10-CM

## 2023-01-25 LAB
T4 FREE SERPL-MCNC: 1.8 NG/DL (ref 0.9–1.7)
TSH SERPL DL<=0.005 MIU/L-ACNC: 0.06 UIU/ML (ref 0.3–4.2)

## 2023-01-25 PROCEDURE — 84443 ASSAY THYROID STIM HORMONE: CPT | Mod: ZL

## 2023-01-25 PROCEDURE — 84439 ASSAY OF FREE THYROXINE: CPT | Mod: ZL

## 2023-01-25 PROCEDURE — 36415 COLL VENOUS BLD VENIPUNCTURE: CPT | Mod: ZL

## 2023-01-25 NOTE — TELEPHONE ENCOUNTER
Patient provided the following symptoms:    Heart fluttering    Inadequate sleep - wakes up in the middle of the night and feels energized like she needs to get up and get going.  Finally falls back to sleep a few hours later    Mild headaches    Family history of thyroid cancer    Attempted to transfer patient to [place], but she was unavailable.    Ludy Richard on 1/25/2023 at 4:37 PM'

## 2023-01-25 NOTE — TELEPHONE ENCOUNTER
Called and left message for patient to return call to provide more information regarding current symptoms.    BETH KNUTSON RN on 1/25/2023 at 3:52 PM

## 2023-01-26 RX ORDER — LEVOTHYROXINE SODIUM 100 UG/1
100 TABLET ORAL DAILY
Qty: 90 TABLET | Refills: 0 | Status: SHIPPED | OUTPATIENT
Start: 2023-01-26 | End: 2023-04-21

## 2023-01-27 ENCOUNTER — TELEPHONE (OUTPATIENT)
Dept: INTERNAL MEDICINE | Facility: OTHER | Age: 68
End: 2023-01-27
Payer: MEDICARE

## 2023-03-08 ENCOUNTER — MYC MEDICAL ADVICE (OUTPATIENT)
Dept: INTERNAL MEDICINE | Facility: OTHER | Age: 68
End: 2023-03-08
Payer: MEDICARE

## 2023-04-10 ENCOUNTER — MYC MEDICAL ADVICE (OUTPATIENT)
Dept: INTERNAL MEDICINE | Facility: OTHER | Age: 68
End: 2023-04-10
Payer: MEDICARE

## 2023-04-10 DIAGNOSIS — E03.9 HYPOTHYROIDISM, UNSPECIFIED TYPE: Primary | ICD-10-CM

## 2023-04-18 ENCOUNTER — LAB (OUTPATIENT)
Dept: LAB | Facility: OTHER | Age: 68
End: 2023-04-18
Attending: INTERNAL MEDICINE
Payer: MEDICARE

## 2023-04-18 DIAGNOSIS — E03.9 HYPOTHYROIDISM, UNSPECIFIED TYPE: ICD-10-CM

## 2023-04-18 LAB — TSH SERPL DL<=0.005 MIU/L-ACNC: 1.21 UIU/ML (ref 0.3–4.2)

## 2023-04-18 PROCEDURE — 84443 ASSAY THYROID STIM HORMONE: CPT | Mod: ZL

## 2023-04-18 PROCEDURE — 36415 COLL VENOUS BLD VENIPUNCTURE: CPT | Mod: ZL

## 2023-04-20 NOTE — TELEPHONE ENCOUNTER
[facility] sent Rx request for the following:    Levothyroxine Sodium 100 MCG Oral Tablet  Last Prescription Date:   1/26/23  Last Fill Qty/Refills:         90, R-0    Last Office Visit:              11/23/22   Future Office visit:           6/7/23  Sbaa Villa RN on 4/20/2023 at 2:42 PM

## 2023-04-21 RX ORDER — LEVOTHYROXINE SODIUM 100 UG/1
TABLET ORAL
Qty: 90 TABLET | Refills: 0 | Status: SHIPPED | OUTPATIENT
Start: 2023-04-21 | End: 2023-06-07

## 2023-06-07 ENCOUNTER — OFFICE VISIT (OUTPATIENT)
Dept: INTERNAL MEDICINE | Facility: OTHER | Age: 68
End: 2023-06-07
Attending: INTERNAL MEDICINE
Payer: MEDICARE

## 2023-06-07 VITALS
HEIGHT: 65 IN | HEART RATE: 76 BPM | DIASTOLIC BLOOD PRESSURE: 68 MMHG | OXYGEN SATURATION: 98 % | SYSTOLIC BLOOD PRESSURE: 132 MMHG | RESPIRATION RATE: 16 BRPM | TEMPERATURE: 97.4 F | WEIGHT: 146.8 LBS | BODY MASS INDEX: 24.46 KG/M2

## 2023-06-07 DIAGNOSIS — L98.9 SKIN LESION OF CHEST WALL: ICD-10-CM

## 2023-06-07 DIAGNOSIS — Z00.00 ENCOUNTER FOR MEDICARE ANNUAL WELLNESS EXAM: Primary | ICD-10-CM

## 2023-06-07 DIAGNOSIS — F41.9 ANXIETY: ICD-10-CM

## 2023-06-07 DIAGNOSIS — K21.9 GASTROESOPHAGEAL REFLUX DISEASE WITHOUT ESOPHAGITIS: ICD-10-CM

## 2023-06-07 DIAGNOSIS — Z23 NEED FOR DIPHTHERIA-TETANUS-PERTUSSIS (TDAP) VACCINE: ICD-10-CM

## 2023-06-07 DIAGNOSIS — R30.0 DYSURIA: ICD-10-CM

## 2023-06-07 DIAGNOSIS — R05.1 ACUTE COUGH: ICD-10-CM

## 2023-06-07 DIAGNOSIS — Z23 NEED FOR SHINGLES VACCINE: ICD-10-CM

## 2023-06-07 DIAGNOSIS — E03.9 HYPOTHYROIDISM, UNSPECIFIED TYPE: ICD-10-CM

## 2023-06-07 LAB
ALBUMIN UR-MCNC: NEGATIVE MG/DL
APPEARANCE UR: CLEAR
BILIRUB UR QL STRIP: NEGATIVE
COLOR UR AUTO: NORMAL
GLUCOSE UR STRIP-MCNC: NEGATIVE MG/DL
HGB UR QL STRIP: NEGATIVE
HYALINE CASTS: 1 /LPF
KETONES UR STRIP-MCNC: NEGATIVE MG/DL
LEUKOCYTE ESTERASE UR QL STRIP: NEGATIVE
NITRATE UR QL: NEGATIVE
PH UR STRIP: 7.5 [PH] (ref 5–9)
RBC URINE: <1 /HPF
SP GR UR STRIP: 1.02 (ref 1–1.03)
UROBILINOGEN UR STRIP-MCNC: NORMAL MG/DL
WBC URINE: <1 /HPF

## 2023-06-07 PROCEDURE — G0463 HOSPITAL OUTPT CLINIC VISIT: HCPCS

## 2023-06-07 PROCEDURE — G0402 INITIAL PREVENTIVE EXAM: HCPCS | Performed by: INTERNAL MEDICINE

## 2023-06-07 PROCEDURE — 99214 OFFICE O/P EST MOD 30 MIN: CPT | Mod: 25 | Performed by: INTERNAL MEDICINE

## 2023-06-07 PROCEDURE — 81001 URINALYSIS AUTO W/SCOPE: CPT | Mod: ZL | Performed by: INTERNAL MEDICINE

## 2023-06-07 PROCEDURE — G0463 HOSPITAL OUTPT CLINIC VISIT: HCPCS | Mod: 25

## 2023-06-07 RX ORDER — CITALOPRAM HYDROBROMIDE 10 MG/1
10 TABLET ORAL DAILY
Qty: 90 TABLET | Refills: 4 | Status: SHIPPED | OUTPATIENT
Start: 2023-06-07 | End: 2024-06-10

## 2023-06-07 RX ORDER — ESOMEPRAZOLE MAGNESIUM 40 MG/1
40 CAPSULE, DELAYED RELEASE ORAL
Qty: 90 CAPSULE | Refills: 4 | Status: SHIPPED | OUTPATIENT
Start: 2023-06-07 | End: 2024-05-14

## 2023-06-07 RX ORDER — CLOSTRIDIUM TETANI TOXOID ANTIGEN (FORMALDEHYDE INACTIVATED), CORYNEBACTERIUM DIPHTHERIAE TOXOID ANTIGEN (FORMALDEHYDE INACTIVATED), BORDETELLA PERTUSSIS TOXOID ANTIGEN (GLUTARALDEHYDE INACTIVATED), BORDETELLA PERTUSSIS FILAMENTOUS HEMAGGLUTININ ANTIGEN (FORMALDEHYDE INACTIVATED), BORDETELLA PERTUSSIS PERTACTIN ANTIGEN, AND BORDETELLA PERTUSSIS FIMBRIAE 2/3 ANTIGEN 5; 2; 2.5; 5; 3; 5 [LF]/.5ML; [LF]/.5ML; UG/.5ML; UG/.5ML; UG/.5ML; UG/.5ML
0.5 INJECTION, SUSPENSION INTRAMUSCULAR ONCE
Qty: 0.5 ML | Refills: 0 | Status: SHIPPED | OUTPATIENT
Start: 2023-06-07 | End: 2023-06-07

## 2023-06-07 RX ORDER — LEVOTHYROXINE SODIUM 100 UG/1
100 TABLET ORAL DAILY
Qty: 90 TABLET | Refills: 4 | Status: SHIPPED | OUTPATIENT
Start: 2023-06-07 | End: 2024-05-14

## 2023-06-07 ASSESSMENT — ENCOUNTER SYMPTOMS
NAUSEA: 0
HEARTBURN: 1
MYALGIAS: 0
NERVOUS/ANXIOUS: 0
HEMATURIA: 0
EYE PAIN: 0
COUGH: 1
ARTHRALGIAS: 0
DIZZINESS: 0
FEVER: 0
CHILLS: 0
FREQUENCY: 0
HEMATOCHEZIA: 0
JOINT SWELLING: 0
SHORTNESS OF BREATH: 0
WEAKNESS: 0
DIARRHEA: 0
PALPITATIONS: 0
HEADACHES: 0
CONSTIPATION: 0
SORE THROAT: 0
PARESTHESIAS: 0
DYSURIA: 0
ABDOMINAL PAIN: 0
BREAST MASS: 0

## 2023-06-07 ASSESSMENT — PAIN SCALES - GENERAL: PAINLEVEL: NO PAIN (0)

## 2023-06-07 ASSESSMENT — ACTIVITIES OF DAILY LIVING (ADL): CURRENT_FUNCTION: NO ASSISTANCE NEEDED

## 2023-06-07 NOTE — NURSING NOTE
"Chief Complaint   Patient presents with     Medicare Visit     Welcome to Medicare        Patient presents to the clinic today for a Welcome to Medicare visit     Initial LMP  (LMP Unknown)  Estimated body mass index is 23.56 kg/m  as calculated from the following:    Height as of 11/23/22: 1.636 m (5' 4.4\").    Weight as of 11/23/22: 63 kg (139 lb).  Meds Reconciled: complete        FOOD SECURITY SCREENING QUESTIONS:    The next two questions are to help us understand your food security.  If you are feeling you need any assistance in this area, we have resources available to support you today.    Hunger Vital Signs:  Within the past 12 months we worried whether our food would run out before we got money to buy more. Never  Within the past 12 months the food we bought just didn't last and we didn't have money to get more. Never  Elsy Calloway LPN,LPN on 6/7/2023 at 9:19 AM      Elsy Calloway LPN  "

## 2023-06-07 NOTE — PROGRESS NOTES
"SUBJECTIVE:   Shirley is a 67 year old who presents for Preventive Visit.      6/7/2023     9:19 AM   Additional Questions   Roomed by Elsy HUYNH     Are you in the first 12 months of your Medicare coverage?  Yes,  Last eye exam 08/22    Healthy Habits:     In general, how would you rate your overall health?  Good    Frequency of exercise:  4-5 days/week    Duration of exercise:  30-45 minutes    Do you usually eat at least 4 servings of fruit and vegetables a day, include whole grains    & fiber and avoid regularly eating high fat or \"junk\" foods?  Yes    Taking medications regularly:  Yes    Medication side effects:  None    Ability to successfully perform activities of daily living:  No assistance needed    Home Safety:  No safety concerns identified    Hearing Impairment:  No hearing concerns    In the past 6 months, have you been bothered by leaking of urine?  No    In general, how would you rate your overall mental or emotional health?  Excellent      PHQ-2 Total Score: 0    Additional concerns today:  No    Patient continues on citalopram and clonazepam for anxiety.  She denies any obvious side effects.  She feels that this has been effective.    She has known acid reflux. She did not find that famotidine was beneficial and has restarted Nexium 40 mg daily.  This has been beneficial and she denies any obvious side effects.    She did recently undergo a fusion of her SI joint.  She does not feel that her leg moves the way it used to and it has caused some balance issues for her.  She is more cautious when she has to walk up and down stairs.    She just noticed yesterday that she has been having some dysuria and suprapubic pain.  She denies any blood in the urine.    She has had some plugging of her left ear.  This has been going on for a while.  She did see a chiropractor for her neck discomfort along with undergoing some acupuncture.  That she does feel that things improved slightly with Epley maneuvers and she " saw ENT.  She does feel the last couple days that things have improved.  She has tried antihistamines.    She does have a spot on the skin of her chest that she would like looked at.  It has been there for a while but she feels perhaps slightly different.  When she saw dermatology I did try to treat with liquid nitrogen however this was unsuccessful.    She has known hypothyroidism and is due for recheck of her TSH along with labs.    She is due for tetanus shot and shingles vaccine.      Have you ever done Advance Care Planning? (For example, a Health Directive, POLST, or a discussion with a medical provider or your loved ones about your wishes): Yes, advance care planning is on file.      Hearing Assessment: not done--followed by Patient had an appointment with Dr. Ugarte (ENT) for hearing  22    Fall risk  Fallen 2 or more times in the past year?: No  Any fall with injury in the past year?: No    Cognitive Screening   1) Repeat 3 items (Leader, Season, Table)    2) Clock draw: NORMAL  3) 3 item recall: Recalls 3 objects  Results: 3 items recalled: COGNITIVE IMPAIRMENT LESS LIKELY    Mini-CogTM Copyright S Daniel. Licensed by the author for use in [facility]; reprinted with permission (lobito@[email or web address]). All rights reserved.      Do you have sleep apnea, excessive snoring or daytime drowsiness?: no    Reviewed and updated as needed this visit by clinical staff   Tobacco  Allergies  Meds  Problems  Med Hx  Surg Hx  Fam Hx          Reviewed and updated as needed this visit by Provider   Tobacco  Allergies  Meds  Problems  Med Hx  Surg Hx  Fam Hx         Social History     Tobacco Use     Smoking status: Former     Packs/day: 0.15     Years: 20.00     Pack years: 3.00     Types: Cigarettes     Quit date: 2005     Years since quittin.4     Smokeless tobacco: Never   Vaping Use     Vaping status: Never Used     Passive vaping exposure: Yes   Substance Use Topics     Alcohol use:  Yes     Comment: Occasional - and no [place] ice teas           6/7/2023     9:18 AM   Alcohol Use   Prescreen: >3 drinks/day or >7 drinks/week? No     Do you have a current opioid prescription? No  Do you use any other controlled substances or medications that are not prescribed by a provider? None    Current providers sharing in care for this patient include:   Patient Care Team:  Sonia Mitchell DO as PCP - General (Internal Medicine)  Pk Polo DO as Mak Grajeda MD as MD (Orthopaedic Surgery)  Omar West MD as Assigned Surgical Provider  Tae Ulloa MD as Assigned Musculoskeletal Provider  Sonia Mitchell DO as Assigned PCP    The following health maintenance items are reviewed in Epic and correct as of today:  Health Maintenance   Topic Date Due     ZOSTER IMMUNIZATION (1 of 2) Never done     COVID-19 Vaccine (3 - Pfizer series) 08/18/2021     Pneumococcal Vaccine: 65+ Years (1 - PCV) 12/12/2022     DTAP/TDAP/TD IMMUNIZATION (2 - Td or Tdap) 09/05/2023     COLORECTAL CANCER SCREENING  10/28/2023     DEXA  12/23/2023     TSH W/FREE T4 REFLEX  04/18/2024     MEDICARE ANNUAL WELLNESS VISIT  06/07/2024     FALL RISK ASSESSMENT  06/07/2024     MAMMO SCREENING  11/25/2024     LIPID  11/23/2027     ADVANCE CARE PLANNING  06/07/2028     HEPATITIS C SCREENING  Completed     PHQ-2 (once per calendar year)  Completed     INFLUENZA VACCINE  Completed     HIV SCREENING  Addressed     IPV IMMUNIZATION  Aged Out     MENINGITIS IMMUNIZATION  Aged Out     Any new diagnosis of family breast, ovarian, or bowel cancer? No    FHS-7:       11/23/2021    10:49 AM 11/25/2022    10:33 AM   Breast CA Risk Assessment (FHS-7)   Did any of your first-degree relatives have breast or ovarian cancer? No No   Did any of your relatives have bilateral breast cancer? No No   Did any man in your family have breast cancer? No No   Did any woman in your family have breast and ovarian cancer?  No   Did any woman  "in your family have breast cancer before age 50 y? No No   Do you have 2 or more relatives with breast and/or ovarian cancer? No No   Do you have 2 or more relatives with breast and/or bowel cancer? No No       Mammogram Screening: Recommended annual mammography  Pertinent mammograms are reviewed under the imaging tab.    Current Outpatient Medications   Medication     citalopram (CELEXA) 10 MG tablet     esomeprazole (NEXIUM) 40 MG DR capsule     levothyroxine (SYNTHROID/LEVOTHROID) 100 MCG tablet     clonazePAM (KLONOPIN) 0.5 MG tablet     NEW MED     No current facility-administered medications for this visit.       Review of Systems   Constitutional: Negative for chills and fever.   HENT: Negative for congestion, ear pain, hearing loss and sore throat.    Eyes: Negative for pain and visual disturbance.   Respiratory: Positive for cough. Negative for shortness of breath.    Cardiovascular: Negative for chest pain, palpitations and peripheral edema.   Gastrointestinal: Positive for heartburn. Negative for abdominal pain, constipation, diarrhea, hematochezia and nausea.   Breasts:  Negative for tenderness, breast mass and discharge.   Genitourinary: Positive for urgency. Negative for dysuria, frequency, genital sores, hematuria, pelvic pain, vaginal bleeding and vaginal discharge.   Musculoskeletal: Negative for arthralgias, joint swelling and myalgias.   Skin: Negative for rash.   Neurological: Negative for dizziness, weakness, headaches and paresthesias.   Psychiatric/Behavioral: Negative for mood changes. The patient is not nervous/anxious.        OBJECTIVE:   /68 (BP Location: Right arm, Patient Position: Sitting, Cuff Size: Adult Regular)   Pulse 76   Temp 97.4  F (36.3  C) (Temporal)   Resp 16   Ht 1.638 m (5' 4.5\")   Wt 66.6 kg (146 lb 12.8 oz)   LMP 01/01/2003 (Approximate)   SpO2 98%   BMI 24.81 kg/m   Estimated body mass index is 24.81 kg/m  as calculated from the following:    Height as of " "this encounter: 1.638 m (5' 4.5\").    Weight as of this encounter: 66.6 kg (146 lb 12.8 oz).  Physical Exam  GEN: Vitals reviewed. Healthy appearing. Patient is in no acute distress. Cooperative with exam.  HEENT: Normocephalic atraumatic.  Eyes grossly normal to inspection.  No discharge or erythema, or obvious scleral/conjunctival abnormalities.   NECK: Supple; no thyromegaly or masses noted.  No cervical or supraclavicular lymphadenopathy.  CV: Heart regular in rate and rhythm with no murmur.    LUNGS: No audible wheeze, cough, or visible cyanosis.  No visible retractions or increased work of breathing.  Lungs clear to auscultation bilaterally.    ABD:  Nondistended  SKIN: Warm and dry to touch.  Visible skin clear. No significant rash, abnormal pigmentation. Lesion on chest is flesh colored but raised and may be c/w possible early BCC or SCC  EXT: No clubbing or cyanosis.  No peripheral edema.  NEURO: Alert and oriented to person, place, and time.  Cranial nerves II-XII grossly intact with no focal or lateralizing deficits.  Muscle tone normal.  Gait normal. No tremor.   MSK: ROM of upper and lower ext symmetric and full.  PSYCH: Mood is good.  Mentation appears normal, affect normal/bright, judgement and insight intact, normal speech and appearance well-groomed.      Diagnostic Test Results:  Labs reviewed in Epic    ASSESSMENT / PLAN:   1. Encounter for Medicare annual wellness exam    2. Anxiety  - Controlled.  Continue current regimen.    - citalopram (CELEXA) 10 MG tablet; Take 1 tablet (10 mg) by mouth daily  Dispense: 90 tablet; Refill: 4    3. Hypothyroidism, unspecified type  - Controlled.  Continue current regimen.  Will adjust depending on results of lab tests  - levothyroxine (SYNTHROID/LEVOTHROID) 100 MCG tablet; Take 1 tablet (100 mcg) by mouth daily  Dispense: 90 tablet; Refill: 4    4. Gastroesophageal reflux disease without esophagitis  - Controlled.  Continue current regimen.    - esomeprazole " (NEXIUM) 40 MG DR capsule; Take 1 capsule (40 mg) by mouth every morning (before breakfast) Take 30-60 minutes before eating.  Dispense: 90 capsule; Refill: 4    5. Need for diphtheria-tetanus-pertussis (Tdap) vaccine  Rx given  - Tdap, tetanus-diphtheria-acell pertussis, (ADACEL) 5-2-15.5 LF-MCG/0.5 injection; Inject 0.5 mLs into the muscle once for 1 dose  Dispense: 0.5 mL; Refill: 0    6. Need for shingles vaccine  Rx given  - zoster vaccine recombinant adjuvanted (SHINGRIX) injection; Inject 0.5 mLs into the muscle once for 1 dose Repeat in 6 months  Dispense: 0.5 mL; Refill: 0    7. Acute cough  Reviewed related to sinus drainage versus acid reflux versus prior illness.  If it does not improve over the next 6 to 8 weeks would recommend further evaluation    8. Dysuria  -UA obtained and does not show infection.  Recommend increased water intake and obtain a repeat UA if symptoms continue.  - UA with Microscopic reflex to Culture    9. Skin lesion of chest wall  Given that it failed to resolve with liquid nitrogen would recommend excisional biopsy.  Referral placed to general surgeon  - Adult General Surg Referral; Future      Patient has been advised of split billing requirements and indicates understanding: Yes      COUNSELING:  Reviewed preventive health counseling, as reflected in patient instructions        She reports that she quit smoking about 18 years ago. Her smoking use included cigarettes. She has a 3.00 pack-year smoking history. She has never used smokeless tobacco.      Appropriate preventive services were discussed with this patient, including applicable screening as appropriate for cardiovascular disease, diabetes, osteopenia/osteoporosis, and glaucoma.  As appropriate for age/gender, discussed screening for colorectal cancer, prostate cancer, breast cancer, and cervical cancer. Checklist reviewing preventive services available has been given to the patient.    Reviewed patients plan of care and  provided an AVS. The Basic Care Plan (routine screening as documented in Health Maintenance) for Shirley meets the Care Plan requirement. This Care Plan has been established and reviewed with the Patient.    Sonia Mitchell DO  [facility]    Identified Health Risks:    I have reviewed Opioid Use Disorder and Substance Use Disorder risk factors and made any needed referrals.

## 2023-06-07 NOTE — PATIENT INSTRUCTIONS
Patient Education   Personalized Prevention Plan  You are due for the preventive services outlined below.  Your care team is available to assist you in scheduling these services.  If you have already completed any of these items, please share that information with your care team to update in your medical record.  Health Maintenance Due   Topic Date Due     Zoster (Shingles) Vaccine (1 of 2) Never done     COVID-19 Vaccine (3 - Pfizer series) 08/18/2021     Pneumococcal Vaccine (1 - PCV) 12/12/2022

## 2023-06-13 ASSESSMENT — ENCOUNTER SYMPTOMS
HEMATOCHEZIA: 0
SORE THROAT: 0
HEARTBURN: 1
HEMATURIA: 0
CHILLS: 0
COUGH: 1
NAUSEA: 0
BREAST MASS: 0
JOINT SWELLING: 0
ABDOMINAL PAIN: 0
FEVER: 0
EYE PAIN: 0
WEAKNESS: 0
SHORTNESS OF BREATH: 0
DIZZINESS: 0
FREQUENCY: 0
DYSURIA: 0
PALPITATIONS: 0
ARTHRALGIAS: 0
MYALGIAS: 0
DIARRHEA: 0
HEADACHES: 0
CONSTIPATION: 0
NERVOUS/ANXIOUS: 0
PARESTHESIAS: 0

## 2023-07-07 NOTE — PROGRESS NOTES
DISCHARGE  Reason for Discharge: Patient chooses to discontinue therapy.    Equipment Issued: na    Discharge Plan: Patient to continue home program.    Referring Provider:  Cherelle Dean         01/14/22 1100   Appointment Info   Signing clinician's name / credentials Holli Farris DPT   Visits Used 3   GOALS   PT Goals 1;2;3   PT Goal 1   Goal Identifier positioning   Goal Description Pt able to get up and down from bed or floor without veritgo in 4 weeks.    Target Date 02/03/22   PT Goal 2   Goal Identifier balance   Goal Description Pt improve stability with h/o SI surgery and vestibular limitations demonstrated by SLS of 30 sec or greater in 8 weeks.   Target Date 03/03/22   PT Goal 3   Goal Identifier HEP   Goal Description pt following through and independent with progression of HEP for vestibular rehab in 8 weeks.    Target Date 03/03/22   Subjective Report   Subjective Report Shirley reports being able to do all her yoga without sx.    Treatment Interventions (PT)   Interventions Neuromuscular Re-education;Standard Canalith Repositioning   Neuromuscular Re-education   Neuromuscular re-ed of mvmt, balance, coord, kinesthetic sense, posture, proprioception minutes (53898) 15   Skilled Intervention vestibular rehab   Patient Response/Progress much improved   Treatment Detail focus on target with head turns x30 sec standing easy- d/c, SLS eyes open easy d/c   Progress SLS eyes closed good challenge B also inst on pillow later, walking with head turns x50 ft easy, walking with head nods x50 ft easy, walking tandem x50 ft good challenge (couldn't keep feet heel to toe)   Standard Canalith Repositioning   Standard canalith repositioning procedure minutes (83394) 10   Canalith Repositioning - Details L x1   Skilled Intervention correct BPPV   Patient Response/Progress clear   Education   Education Comments [facility] code: [email or web address]   Plan   Homework 2 targets moving eyes back and forth side to side x30 sec,  focus on target with head turns x30 sec, inst on Home Epley for future use    Plan for next session hold, keep chart open x1 month if exacerbation   Medicare Claim Information   Medical Diagnosis R42 (ICD-10-CM) - Dizziness   Session Number   Authorization status [employer] 40 visits per year

## 2023-10-04 ENCOUNTER — TELEPHONE (OUTPATIENT)
Dept: INTERNAL MEDICINE | Facility: OTHER | Age: 68
End: 2023-10-04
Payer: MEDICARE

## 2023-10-04 DIAGNOSIS — J30.9 CHRONIC ALLERGIC RHINITIS: Primary | ICD-10-CM

## 2023-10-04 NOTE — TELEPHONE ENCOUNTER
Patient would like to get a referral for an allergist for testing at [facility] in [place].      Please let patient know if a referral is placed   Thank you   Maggie Herr on 10/4/2023 at 10:47 AM

## 2023-10-04 NOTE — TELEPHONE ENCOUNTER
Called and spoke to Shirley. The last year she has been having runny nose, mucus, itchy watery eyes and hives. Has been taking allegra and it did help for a month and now does not help at all.   Luciana Stevens LPN on 10/4/2023 at 4:18 PM

## 2023-10-04 NOTE — TELEPHONE ENCOUNTER
Will you clarify specifically what type of allergies/symptoms patient is having for reason for wanting consult and associate diagnosis with referral?      Thanks,  Junie

## 2023-10-10 ENCOUNTER — TELEPHONE (OUTPATIENT)
Dept: INTERNAL MEDICINE | Facility: OTHER | Age: 68
End: 2023-10-10
Payer: MEDICARE

## 2023-10-19 DIAGNOSIS — Z12.11 SCREENING FOR COLON CANCER: Primary | ICD-10-CM

## 2023-10-19 DIAGNOSIS — F41.9 ANXIETY: ICD-10-CM

## 2023-10-19 DIAGNOSIS — Z12.31 ENCOUNTER FOR SCREENING MAMMOGRAM FOR BREAST CANCER: ICD-10-CM

## 2023-10-25 ENCOUNTER — LAB (OUTPATIENT)
Dept: INTERNAL MEDICINE | Facility: OTHER | Age: 68
End: 2023-10-25
Payer: MEDICARE

## 2023-10-25 DIAGNOSIS — Z12.11 SCREENING FOR COLON CANCER: ICD-10-CM

## 2023-10-25 RX ORDER — CLONAZEPAM 0.5 MG/1
0.25 TABLET ORAL
Qty: 30 TABLET | Refills: 5 | Status: SHIPPED | OUTPATIENT
Start: 2023-10-25

## 2023-10-25 NOTE — TELEPHONE ENCOUNTER
[facility] sent Rx request for the following:      Requested Prescriptions   Pending Prescriptions Disp Refills    clonazePAM (KLONOPIN) 0.5 MG tablet 30 tablet 5     Sig: Take 0.5 tablets (0.25 mg) by mouth nightly as needed for anxiety or sleep - May take 2 tablets if having anxiety attack or difficulty sleeping.       There is no refill protocol information for this order          Last Prescription Date:   11/23/22  Last Fill Qty/Refills:         30, R-5    Last Office Visit:              6/7/23   Future Office visit:           6/10/24    Saba Villa RN on 10/25/2023 at 3:24 PM

## 2023-11-16 LAB — NONINV COLON CA DNA+OCC BLD SCRN STL QL: NEGATIVE

## 2023-12-29 ENCOUNTER — HOSPITAL ENCOUNTER (OUTPATIENT)
Dept: MAMMOGRAPHY | Facility: OTHER | Age: 68
Discharge: HOME | End: 2023-12-29
Attending: INTERNAL MEDICINE | Admitting: INTERNAL MEDICINE
Payer: MEDICARE

## 2023-12-29 DIAGNOSIS — Z12.31 ENCOUNTER FOR SCREENING MAMMOGRAM FOR BREAST CANCER: ICD-10-CM

## 2023-12-29 PROCEDURE — 77067 SCR MAMMO BI INCL CAD: CPT

## 2024-01-09 NOTE — NURSING NOTE
"Chief Complaint   Patient presents with     Recheck Medication     Refills, labs       Initial /82 (BP Location: Right arm, Patient Position: Sitting, Cuff Size: Adult Regular)   Pulse 72   Temp 97.8  F (36.6  C) (Temporal)   Resp 16   Wt 61.9 kg (136 lb 6.4 oz)   LMP  (LMP Unknown)   SpO2 100%   Breastfeeding No   BMI 22.70 kg/m   Estimated body mass index is 22.7 kg/m  as calculated from the following:    Height as of 12/28/21: 1.651 m (5' 5\").    Weight as of this encounter: 61.9 kg (136 lb 6.4 oz).     Medication Reconciliation: complete      FOOD SECURITY SCREENING QUESTIONS:    The next two questions are to help us understand your food security.  If you are feeling you need any assistance in this area, we have resources available to support you today.    Hunger Vital Signs:  Within the past 12 months we worried whether our food would run out before we got money to buy more. Never  Within the past 12 months the food we bought just didn't last and we didn't have money to get more. Never        Advance care plan reviewed      Kimberlyn Escobar LPN on 5/23/2022 at 11:40 AM      " Reviewed on peds genetics call on 2024. 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 Will reach out to Dr. Magana via email to discuss the option of a consult following patient's fetal MRI ( scheduled for 24).    Sabrina Hunt MS, [facility]  Maternal Fetal Medicine  [facility]  Phone:[phone]

## 2024-02-08 ENCOUNTER — PATIENT OUTREACH (OUTPATIENT)
Dept: GASTROENTEROLOGY | Facility: CLINIC | Age: 69
End: 2024-02-08
Payer: MEDICARE

## 2024-03-14 ENCOUNTER — E-VISIT (OUTPATIENT)
Dept: INTERNAL MEDICINE | Facility: OTHER | Age: 69
End: 2024-03-14
Payer: MEDICARE

## 2024-03-14 DIAGNOSIS — E03.9 HYPOTHYROIDISM, UNSPECIFIED TYPE: Primary | ICD-10-CM

## 2024-03-14 PROCEDURE — 99421 OL DIG E/M SVC 5-10 MIN: CPT | Performed by: INTERNAL MEDICINE

## 2024-03-14 NOTE — PATIENT INSTRUCTIONS
Thank you for choosing us for your care. I have placed the orders to check the thyroid. I will watch for results.  Please schedule an appointment with the lab right here in [place], or call [phone].  I will let you know when the results are back and next steps to take.    Thank you,  Dr Sonia Mitchell

## 2024-03-15 ENCOUNTER — LAB (OUTPATIENT)
Dept: LAB | Facility: OTHER | Age: 69
End: 2024-03-15
Attending: INTERNAL MEDICINE
Payer: MEDICARE

## 2024-03-15 DIAGNOSIS — E03.9 HYPOTHYROIDISM, UNSPECIFIED TYPE: ICD-10-CM

## 2024-03-15 LAB
T4 FREE SERPL-MCNC: 1.18 NG/DL (ref 0.9–1.7)
TSH SERPL DL<=0.005 MIU/L-ACNC: 1.48 UIU/ML (ref 0.3–4.2)

## 2024-03-15 PROCEDURE — 84443 ASSAY THYROID STIM HORMONE: CPT | Mod: ZL

## 2024-03-15 PROCEDURE — 84439 ASSAY OF FREE THYROXINE: CPT | Mod: ZL

## 2024-03-15 PROCEDURE — 36415 COLL VENOUS BLD VENIPUNCTURE: CPT | Mod: ZL

## 2024-04-16 ENCOUNTER — OFFICE VISIT (OUTPATIENT)
Dept: OBGYN | Facility: OTHER | Age: 69
End: 2024-04-16
Attending: NURSE PRACTITIONER
Payer: MEDICARE

## 2024-04-16 VITALS
WEIGHT: 152.2 LBS | HEIGHT: 65 IN | DIASTOLIC BLOOD PRESSURE: 84 MMHG | BODY MASS INDEX: 25.36 KG/M2 | SYSTOLIC BLOOD PRESSURE: 130 MMHG | OXYGEN SATURATION: 100 % | TEMPERATURE: 98.7 F | RESPIRATION RATE: 16 BRPM | HEART RATE: 74 BPM

## 2024-04-16 DIAGNOSIS — B96.89 BACTERIAL VAGINOSIS: ICD-10-CM

## 2024-04-16 DIAGNOSIS — N89.8 VAGINAL ITCHING: ICD-10-CM

## 2024-04-16 DIAGNOSIS — N30.01 ACUTE CYSTITIS WITH HEMATURIA: Primary | ICD-10-CM

## 2024-04-16 DIAGNOSIS — N76.0 BACTERIAL VAGINOSIS: ICD-10-CM

## 2024-04-16 LAB
BACTERIAL VAGINOSIS VAG-IMP: POSITIVE
CANDIDA DNA VAG QL NAA+PROBE: NOT DETECTED
CANDIDA GLABRATA / CANDIDA KRUSEI DNA: NOT DETECTED
T VAGINALIS DNA VAG QL NAA+PROBE: NOT DETECTED

## 2024-04-16 PROCEDURE — 99214 OFFICE O/P EST MOD 30 MIN: CPT | Performed by: NURSE PRACTITIONER

## 2024-04-16 PROCEDURE — 0352U MULTIPLEX VAGINAL PANEL BY PCR: CPT | Mod: ZL | Performed by: NURSE PRACTITIONER

## 2024-04-16 PROCEDURE — G0463 HOSPITAL OUTPT CLINIC VISIT: HCPCS

## 2024-04-16 RX ORDER — CEPHALEXIN 500 MG/1
500 CAPSULE ORAL 2 TIMES DAILY
Qty: 14 CAPSULE | Refills: 0 | Status: SHIPPED | OUTPATIENT
Start: 2024-04-16 | End: 2024-04-23

## 2024-04-16 RX ORDER — METRONIDAZOLE 500 MG/1
500 TABLET ORAL 2 TIMES DAILY
Qty: 14 TABLET | Refills: 0 | Status: SHIPPED | OUTPATIENT
Start: 2024-04-16 | End: 2024-04-23

## 2024-04-16 ASSESSMENT — PAIN SCALES - GENERAL: PAINLEVEL: MODERATE PAIN (5)

## 2024-04-16 NOTE — NURSING NOTE
"Chief Complaint   Patient presents with    UTI     Moved from [facility].     Patient presents to clinic today from a possible UTI, patient was moved from the [facility] clinic.  Initial LMP 01/01/2003 (Approximate)  Estimated body mass index is 24.81 kg/m  as calculated from the following:    Height as of 6/7/23: 1.638 m (5' 4.5\").    Weight as of 6/7/23: 66.6 kg (146 lb 12.8 oz).  Medication Review: complete    The next two questions are to help us understand your food security.  If you are feeling you need any assistance in this area, we have resources available to support you today.           No data to display                  Health Care Directive:  Patient has a Health Care Directive on file      Jose Philippe      "

## 2024-04-16 NOTE — PROGRESS NOTES
Shirley Hollins  1955    ASSESSMENT/PLAN  1. Acute cystitis with hematuria  - cephALEXin (KEFLEX) 500 MG capsule; Take 1 capsule (500 mg) by mouth 2 times daily for 7 days  Dispense: 14 capsule; Refill: 0    Patient presents with 5 days of urinary symptoms however notes that while on her vacation in [place] she was having difficulty with urgency. Urinalysis today returned showing small amount of blood, 24 RBCs, large amount of leukocyte esterase, 114 WBCs.  Recommend treating acute cystitis with hematuria with cephalexin twice daily for 7 days.  Urine culture is processing and patient will be notified of these results in 48 to 72 hours once they return, patient will be made aware of any antibiotic changes that are indicated.    2. Vaginal itching  - Multiplex Vaginal Panel by PCR  Patient has been having some vaginal irritation and itching.  Vaginal multiplex obtained today and she will be treated appropriately once results return.    3. Bacterial vaginosis  - metroNIDAZOLE (FLAGYL) 500 MG tablet; Take 1 tablet (500 mg) by mouth 2 times daily for 7 days  Dispense: 14 tablet; Refill: 0   Vaginal Multiplex returned positive for bacterial vaginosis.  Patient made aware of Bacterial Vaginosis and recommended treatment of Flagyl twice daily for 7 days.    Explanation of diagnosis, treatment options and risk and benefits of medications reviewed with patient. Patient agrees with plan of care.  All questions were answered.  Red flags symptoms were discussed and patient was advised when they should return for reevaluation or for prompt emergency evaluation.   Patient was given verbal and written instructions on plan of care. Instructions were printed or are available on [employer] on electronic AVS.     SUBJECTIVE  CHIEF COMPLAINT/ REASON FOR VISIT  Patient presents with:  UTI: Moved from [facility].     HISTORY OF PRESENT ILLNESS  Shirley Hollins is a pleasant  68 year old female presents to OBGYN today for  "possible urinary tract infection.    Patient states on Friday she started to feel uncomfortable while urinating and started drinking cranberry juice.  Her symptoms continued on Saturday with urinary frequency urgency and minimal amount of urine coming out when urinating.  She was having dysuria, no hematuria.  She started taking Azo, probiotic and vitamin C.  Last night her symptoms progressively worsened with increase pain with urination.  No abdominal pain.  She notes a little bit of low back pain.  No fever.    She also notes she has been having some slight vaginal itching.  No change in vaginal discharge.  She was in [place] for about 2 weeks and returned on 4/11/2024.    Patient is status post hysterectomy 30 years ago.    I have reviewed the nursing notes.  I have reviewed allergies, medication list, problem list, and past medical history.    REVIEW OF SYSTEMS  Review of Systems   All other systems reviewed and are negative.     VITAL SIGNS  Vitals:    04/16/24 1023   BP: 130/84   BP Location: Right arm   Patient Position: Sitting   Cuff Size: Adult Regular   Pulse: 74   Resp: 16   Temp: 98.7  F (37.1  C)   TempSrc: Tympanic   SpO2: 100%   Weight: 69 kg (152 lb 3.2 oz)   Height: 1.651 m (5' 5\")      Body mass index is 25.33 kg/m .    OBJECTIVE  PHYSICAL EXAM  Physical Exam  Vitals reviewed. Exam conducted with a chaperone present.   Constitutional:       Appearance: Normal appearance. She is not ill-appearing or toxic-appearing.   HENT:      Head: Normocephalic and atraumatic.      Nose: Nose normal.   Eyes:      Conjunctiva/sclera: Conjunctivae normal.   Pulmonary:      Effort: Pulmonary effort is normal.   Abdominal:      General: There is no distension.      Palpations: Abdomen is soft.      Tenderness: There is no abdominal tenderness. There is no right CVA tenderness, left CVA tenderness or guarding.   Genitourinary:     Comments: Female exam-  Patient placed on exam table in prone position/lithotomy " position.  Chaperone present.  Skin warm dry and intact.  No visible rash or abrasions.  No visible concerning external vaginal or anal lesions.no cervix present.  No vaginal discharge or odor.  No pelvic discomfort.        Neurological:      General: No focal deficit present.      Mental Status: She is alert and oriented to person, place, and time.   Psychiatric:         Mood and Affect: Mood normal.         Behavior: Behavior normal.         Thought Content: Thought content normal.         Judgment: Judgment normal.        DIAGNOSTICS  Results for orders placed or performed in visit on 04/16/24   UA with Microscopic reflex to Culture     Status: Abnormal    Specimen: Urine, Midstream   Result Value Ref Range    Color Urine Light Yellow Colorless, Straw, Light Yellow, Yellow    Appearance Urine Clear Clear    Glucose Urine Negative Negative mg/dL    Bilirubin Urine Negative Negative    Ketones Urine Negative Negative mg/dL    Specific Gravity Urine 1.013 1.000 - 1.030    Blood Urine Small (A) Negative    pH Urine 8.0 5.0 - 9.0    Protein Albumin Urine 20 (A) Negative mg/dL    Urobilinogen Urine Normal Normal, 2.0 mg/dL    Nitrite Urine Negative Negative    Leukocyte Esterase Urine Large (A) Negative    Bacteria Urine Few (A) None Seen /HPF    Mucus Urine Present (A) None Seen /LPF    RBC Urine 24 (H) <=2 /HPF    WBC Urine 114 (H) <=5 /HPF    Squamous Epithelials Urine 4 (H) <=1 /HPF    Narrative    Urine Culture ordered based on laboratory criteria   Results for orders placed or performed in visit on 04/16/24   Multiplex Vaginal Panel by PCR     Status: Abnormal    Specimen: Vagina; Swab   Result Value Ref Range    Bacterial Vaginosis Organism DNA Positive (A) Negative    Candida Group DNA Not Detected Not Detected    Candida glabrata / Twila krusei DNA Not Detected Not Detected    Trichomonas vaginalis DNA Not Detected Not Detected    Narrative    The Xpert  Xpress MVP test, performed on the [employer], is an automated, qualitative in vitro diagnostic test for the detection of DNA targets from anaerobic bacteria associated with bacterial vaginosis, Candida species associated with vulvovaginal candidiasis, and Trichomonas vaginalis. The assay uses clinician-collected and self-collected vaginal swabs from patients who are symptomatic for vaginitis/ vaginosis. The Xpert  Xpress MVP test utilizes real-time polymerase chain reaction (PCR) for the amplification of specific DNA targets and utilizes fluorogenic target-specific hybridization probes to detect and differentiate DNA. It is intended to aid in the diagnosis of vaginal infections in women with a clinical presentation consistent with bacterial vaginosis, vulvovaginal candidiasis, or trichomoniasis.   The assay targets three anaerobic microorgansims that are associated with bacterial vaginosis (BV). Other organisms that are not detected by the Xpert  Xpress MVP test have also been reported to be associated with BV. The BV organism and Candida species targets of the Xpert  Xpress MVP test can be commensal in women; positive results must be considered in conjunction with other clinical and patient information to determine the disease status.        Fely Ramey NP  [facility]

## 2024-05-20 NOTE — PROGRESS NOTES
Chief Complaint   Patient presents with     Elbow Pain     Left elbow burisitiis          Subjective:   Ms. Hollins is a 63 year old female  seen for the acute concern today of swelling of her left elbow.    She reports that this has been going on for approximately 1 month.  A week ago she started taking naproxen 500 mg twice daily.  Over the weekend she noticed that her elbow got hot and better.  She did feel feverish for a while and had a sick overall.  She did not take a specific temperature.  Today she feels that the symptoms have improved although continues to have some discomfort and redness around the bursa.  She has not had anything like this prior in the elbow.    She denies any specific trauma to the elbow.  She does lean on her elbows on a regular basis.      She  reports that she quit smoking about 14 years ago. Her smoking use included cigarettes. She has a 3.00 pack-year smoking history. She has never used smokeless tobacco.    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 06/15/2017     Diverticulosis of intestine without perforation or abscess without bleeding      Ectopic pregnancy without intrauterine pregnancy 1990     Gastro-esophageal reflux disease without esophagitis      Hyperlipidemia      Hypothyroidism      Nonspecific reaction to tuberculin skin test without active tuberculosis     2001,Normal CXR; treated with INH, incomplete treatment but mostly finished     Other bursal cyst, left hip 01/05/2016     Other complicated headache syndrome     2008,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      ROS:   Pertinent  ROS was performed and was negative, including for fevers, chills today.  No other concerns, with  Dear Anant,    You recently participated in [facility]'s LOCKETT alpha-1-antitrypsin, iron and iron saturation, NAIMA, Best,  Kaykay Enriquez MD    exception of HPI above.      Objective:    /70 (BP Location: Right arm, Patient Position: Sitting, Cuff Size: Adult Regular)   Pulse 80   Temp 97.4  F (36.3  C) (Tympanic)   Resp 16   Wt 66.4 kg (146 lb 6.4 oz)   BMI 24.36 kg/m    GEN: Vitals reviewed.  Patient is in no acute distress. Cooperative with exam.  HEENT: Normocephalic atraumatic.  Pupils equally round.  No scleral icterus, no conjunctival erythema.   LUNGS:  Chest rise equal bilaterally.  No accessory muscle use.  SKIN: Warm and dry to touch.  No rash on face, arms and legs.  EXT: No clubbing or cyanosis.   Left elbow with swelling along the olecranon.  Minimal erythema noted, with slight increase in heat.  Able to palpate the area of the elbow and bursa itself with minimal tenderness.  She does have some tenderness just medial to this.  No decreased range of motion.     Assessment/Plan:   Olecranon bursitis of left elbow  -At this time her symptoms may reflect mild infective bursitis.  We will plan at this time to treat with some antibiotics.  I will plan to follow-up with her in 2 to 3 days and if she has not seen any improvement or is having worsening we will consider aspiration with cell count and culture.  She was instructed to call should she have any worsening symptoms including fevers, increased pain, or new concerns.  - cefdinir (OMNICEF) 300 MG capsule  Dispense: 14 capsule; Refill: 0    - Return/call as needed for follow-up should any new symptoms develop, for worsening of current symptoms or if symptoms do not resolve with above plan.    Return if symptoms worsen or fail to improve.     RACHEL LEUNG DO   5/6/2019 8:31 AM    This document was prepared using voice generated softwear. While every attempt was made for accuracy, grammatical errors may exist.

## 2024-06-09 SDOH — HEALTH STABILITY: PHYSICAL HEALTH: ON AVERAGE, HOW MANY DAYS PER WEEK DO YOU ENGAGE IN MODERATE TO STRENUOUS EXERCISE (LIKE A BRISK WALK)?: 5 DAYS

## 2024-06-09 SDOH — HEALTH STABILITY: PHYSICAL HEALTH: ON AVERAGE, HOW MANY MINUTES DO YOU ENGAGE IN EXERCISE AT THIS LEVEL?: 30 MIN

## 2024-06-09 ASSESSMENT — SOCIAL DETERMINANTS OF HEALTH (SDOH): HOW OFTEN DO YOU GET TOGETHER WITH FRIENDS OR RELATIVES?: MORE THAN THREE TIMES A WEEK

## 2024-06-10 ENCOUNTER — OFFICE VISIT (OUTPATIENT)
Dept: INTERNAL MEDICINE | Facility: OTHER | Age: 69
End: 2024-06-10
Attending: INTERNAL MEDICINE
Payer: MEDICARE

## 2024-06-10 VITALS
DIASTOLIC BLOOD PRESSURE: 100 MMHG | TEMPERATURE: 97.9 F | RESPIRATION RATE: 14 BRPM | BODY MASS INDEX: 25.49 KG/M2 | OXYGEN SATURATION: 100 % | HEIGHT: 65 IN | WEIGHT: 153 LBS | HEART RATE: 76 BPM | SYSTOLIC BLOOD PRESSURE: 150 MMHG

## 2024-06-10 DIAGNOSIS — K21.9 GASTROESOPHAGEAL REFLUX DISEASE WITHOUT ESOPHAGITIS: ICD-10-CM

## 2024-06-10 DIAGNOSIS — Z23 NEED FOR PNEUMOCOCCAL VACCINATION: ICD-10-CM

## 2024-06-10 DIAGNOSIS — F41.9 ANXIETY: ICD-10-CM

## 2024-06-10 DIAGNOSIS — M85.88 OSTEOPENIA OF LUMBAR SPINE: ICD-10-CM

## 2024-06-10 DIAGNOSIS — R32 URINARY INCONTINENCE, UNSPECIFIED TYPE: ICD-10-CM

## 2024-06-10 DIAGNOSIS — R73.9 ELEVATED RANDOM BLOOD GLUCOSE LEVEL: ICD-10-CM

## 2024-06-10 DIAGNOSIS — Z71.85 VACCINE COUNSELING: ICD-10-CM

## 2024-06-10 DIAGNOSIS — R03.0 ELEVATED BLOOD PRESSURE READING WITHOUT DIAGNOSIS OF HYPERTENSION: ICD-10-CM

## 2024-06-10 DIAGNOSIS — Z78.0 ASYMPTOMATIC MENOPAUSAL STATE: ICD-10-CM

## 2024-06-10 DIAGNOSIS — M62.89 PELVIC FLOOR DYSFUNCTION: ICD-10-CM

## 2024-06-10 DIAGNOSIS — J30.9 CHRONIC ALLERGIC RHINITIS: ICD-10-CM

## 2024-06-10 DIAGNOSIS — R74.8 ELEVATED LIVER ENZYMES: ICD-10-CM

## 2024-06-10 DIAGNOSIS — E03.9 HYPOTHYROIDISM, UNSPECIFIED TYPE: ICD-10-CM

## 2024-06-10 DIAGNOSIS — R06.09 DOE (DYSPNEA ON EXERTION): ICD-10-CM

## 2024-06-10 DIAGNOSIS — Z00.00 ENCOUNTER FOR MEDICARE ANNUAL WELLNESS EXAM: Primary | ICD-10-CM

## 2024-06-10 DIAGNOSIS — R26.89 BALANCE PROBLEM: ICD-10-CM

## 2024-06-10 DIAGNOSIS — E78.5 HYPERLIPIDEMIA, UNSPECIFIED HYPERLIPIDEMIA TYPE: ICD-10-CM

## 2024-06-10 LAB
ALBUMIN SERPL BCG-MCNC: 4.9 G/DL (ref 3.5–5.2)
ALP SERPL-CCNC: 110 U/L (ref 40–150)
ALT SERPL W P-5'-P-CCNC: 61 U/L (ref 0–50)
ANION GAP SERPL CALCULATED.3IONS-SCNC: 10 MMOL/L (ref 7–15)
AST SERPL W P-5'-P-CCNC: 32 U/L (ref 0–45)
BILIRUB SERPL-MCNC: 0.4 MG/DL
BUN SERPL-MCNC: 16.2 MG/DL (ref 8–23)
CALCIUM SERPL-MCNC: 10.3 MG/DL (ref 8.8–10.2)
CHLORIDE SERPL-SCNC: 100 MMOL/L (ref 98–107)
CHOLEST SERPL-MCNC: 318 MG/DL
CREAT SERPL-MCNC: 0.67 MG/DL (ref 0.51–0.95)
DEPRECATED HCO3 PLAS-SCNC: 28 MMOL/L (ref 22–29)
EGFRCR SERPLBLD CKD-EPI 2021: >90 ML/MIN/1.73M2
ERYTHROCYTE [DISTWIDTH] IN BLOOD BY AUTOMATED COUNT: 12.5 % (ref 10–15)
FASTING STATUS PATIENT QL REPORTED: YES
FASTING STATUS PATIENT QL REPORTED: YES
GLUCOSE SERPL-MCNC: 106 MG/DL (ref 70–99)
HBA1C MFR BLD: 5.5 % (ref 4–6.2)
HCT VFR BLD AUTO: 44.8 % (ref 35–47)
HDLC SERPL-MCNC: 67 MG/DL
HGB BLD-MCNC: 14.9 G/DL (ref 11.7–15.7)
LDLC SERPL CALC-MCNC: 231 MG/DL
MAGNESIUM SERPL-MCNC: 2.1 MG/DL (ref 1.7–2.3)
MCH RBC QN AUTO: 31.6 PG (ref 26.5–33)
MCHC RBC AUTO-ENTMCNC: 33.3 G/DL (ref 31.5–36.5)
MCV RBC AUTO: 95 FL (ref 78–100)
NONHDLC SERPL-MCNC: 251 MG/DL
PLATELET # BLD AUTO: 242 10E3/UL (ref 150–450)
POTASSIUM SERPL-SCNC: 4.1 MMOL/L (ref 3.4–5.3)
PROT SERPL-MCNC: 8 G/DL (ref 6.4–8.3)
RBC # BLD AUTO: 4.72 10E6/UL (ref 3.8–5.2)
SODIUM SERPL-SCNC: 138 MMOL/L (ref 135–145)
TRIGL SERPL-MCNC: 101 MG/DL
TSH SERPL DL<=0.005 MIU/L-ACNC: 2.82 UIU/ML (ref 0.3–4.2)
VIT B12 SERPL-MCNC: 360 PG/ML (ref 232–1245)
WBC # BLD AUTO: 4.5 10E3/UL (ref 4–11)

## 2024-06-10 PROCEDURE — 85027 COMPLETE CBC AUTOMATED: CPT | Mod: ZL | Performed by: INTERNAL MEDICINE

## 2024-06-10 PROCEDURE — 80053 COMPREHEN METABOLIC PANEL: CPT | Mod: ZL | Performed by: INTERNAL MEDICINE

## 2024-06-10 PROCEDURE — 82607 VITAMIN B-12: CPT | Mod: ZL | Performed by: INTERNAL MEDICINE

## 2024-06-10 PROCEDURE — 83036 HEMOGLOBIN GLYCOSYLATED A1C: CPT | Mod: ZL | Performed by: INTERNAL MEDICINE

## 2024-06-10 PROCEDURE — 80061 LIPID PANEL: CPT | Mod: ZL | Performed by: INTERNAL MEDICINE

## 2024-06-10 PROCEDURE — 99214 OFFICE O/P EST MOD 30 MIN: CPT | Mod: 25 | Performed by: INTERNAL MEDICINE

## 2024-06-10 PROCEDURE — 84443 ASSAY THYROID STIM HORMONE: CPT | Mod: ZL | Performed by: INTERNAL MEDICINE

## 2024-06-10 PROCEDURE — G0463 HOSPITAL OUTPT CLINIC VISIT: HCPCS | Mod: 25 | Performed by: INTERNAL MEDICINE

## 2024-06-10 PROCEDURE — 36415 COLL VENOUS BLD VENIPUNCTURE: CPT | Mod: ZL | Performed by: INTERNAL MEDICINE

## 2024-06-10 PROCEDURE — G0438 PPPS, INITIAL VISIT: HCPCS | Performed by: INTERNAL MEDICINE

## 2024-06-10 PROCEDURE — G0009 ADMIN PNEUMOCOCCAL VACCINE: HCPCS

## 2024-06-10 PROCEDURE — 83735 ASSAY OF MAGNESIUM: CPT | Mod: ZL | Performed by: INTERNAL MEDICINE

## 2024-06-10 RX ORDER — ROSUVASTATIN CALCIUM 5 MG/1
5 TABLET, COATED ORAL DAILY
Qty: 90 TABLET | Refills: 4 | Status: SHIPPED | OUTPATIENT
Start: 2024-06-10

## 2024-06-10 RX ORDER — RESPIRATORY SYNCYTIAL VIRUS VACCINE 120MCG/0.5
0.5 KIT INTRAMUSCULAR ONCE
Status: SHIPPED
Start: 2024-06-10 | End: 2024-06-10

## 2024-06-10 RX ORDER — CETIRIZINE HCL 1 MG/ML
10 SOLUTION, ORAL ORAL DAILY
Qty: 90 TABLET | Refills: 4 | Status: SHIPPED | OUTPATIENT
Start: 2024-06-10

## 2024-06-10 RX ORDER — CETIRIZINE HYDROCHLORIDE 10 MG/1
10 TABLET ORAL DAILY
COMMUNITY
End: 2024-06-10 | Stop reason: ALTCHOICE

## 2024-06-10 RX ORDER — LEVOTHYROXINE SODIUM 100 UG/1
100 TABLET ORAL DAILY
Qty: 90 TABLET | Refills: 4 | Status: SHIPPED | OUTPATIENT
Start: 2024-06-10 | End: 2024-06-11

## 2024-06-10 RX ORDER — CLONAZEPAM 0.5 MG/1
0.25 TABLET ORAL
Qty: 30 TABLET | Refills: 5 | Status: CANCELLED | OUTPATIENT
Start: 2024-06-10

## 2024-06-10 RX ORDER — ESOMEPRAZOLE MAGNESIUM 40 MG/1
40 CAPSULE, DELAYED RELEASE ORAL
Qty: 90 CAPSULE | Refills: 4 | Status: SHIPPED | OUTPATIENT
Start: 2024-06-10

## 2024-06-10 ASSESSMENT — PAIN SCALES - GENERAL: PAINLEVEL: NO PAIN (0)

## 2024-06-10 NOTE — PROGRESS NOTES
"  Preventive Care Visit  [facility]  Sonia Mitchell DO, Internal Medicine  Augustus 10, 2024      Assessment & Plan     Wellness Visit Notes:    -Last mammo done 12/29/23, due 12/29/24 (impression: normal, annual screening)    -Last DEXA done 12/23/20, due now (impression: Osteopenia.)    -Last colon cancer screening done 11/7/23 (cologaurd), due 11/7/2026 (impression: negative)    -Immunizations: RSV in the fall, prevnar 20    Annual labs ordered. Medications renewed.     Yoga and exercising daily. Wearing sunscreen daily. Diet is well balanced.     Patient has been advised of split billing requirements and indicates understanding: Yes    BMI  Estimated body mass index is 25.86 kg/m  as calculated from the following:    Height as of this encounter: 1.638 m (5' 4.5\").    Weight as of this encounter: 69.4 kg (153 lb).       Counseling  Appropriate preventive services were discussed with this patient, including applicable screening as appropriate for fall prevention, nutrition, physical activity, Tobacco-use cessation, weight loss and cognition.  Checklist reviewing preventive services available has been given to the patient.  Reviewed patient's diet, addressing concerns and/or questions.   She is at risk for psychosocial distress and has been provided with information to reduce risk.   Discussed possible causes of fatigue. Information on urinary incontinence and treatment options given to patient.       Work on weight loss  Regular exercise    Return in about 1 year (around 6/10/2025) for Annual Review with renewal of all medications, and as needed sooner.    Herrera Watson is a 68 year old, presenting for the following:  Medicare Visit        6/10/2024     9:22 AM   Additional Questions   Roomed by CANDY Mccauley   Accompanied by YAJAIRA       Health Care Directive  Patient has a Health Care Directive on file  Advance care planning document is on file and is current.          6/9/2024   General Health "   How would you rate your overall physical health? (!) FAIR   Feel stress (tense, anxious, or unable to sleep) Only a little   (!) STRESS CONCERN      6/9/2024   Nutrition   Diet: Regular (no restrictions)         6/9/2024   Exercise   Days per week of moderate/strenous exercise 5 days   Average minutes spent exercising at this level 30 min         6/9/2024   Social Factors   Frequency of gathering with friends or relatives More than three times a week   Worry food won't last until get money to buy more No   Food not last or not have enough money for food? No   Do you have housing?  Yes   Are you worried about losing your housing? No   Lack of transportation? No   Unable to get utilities (heat,electricity)? No         6/10/2024   Fall Risk   Gait Speed Test Interpretation Less than or equal to 5.00 seconds - PASS         6/9/2024   Activities of Daily Living- Home Safety   Needs help with the following daily activites None of the above   Safety concerns in the home None of the above         6/9/2024   Dental   Dentist two times every year? Yes         6/9/2024   Hearing Screening   Hearing concerns? None of the above         6/9/2024   Driving Risk Screening   Patient/family members have concerns about driving No         6/9/2024   General Alertness/Fatigue Screening   Have you been more tired than usual lately? (!) YES         6/9/2024   Urinary Incontinence Screening   Bothered by leaking urine in past 6 months Yes         6/9/2024   TB Screening   Were you born outside of the [place]? No         Today's PHQ-2 Score:       6/9/2024     7:29 PM   PHQ-2 ( 1999 Pf[employer])   Q1: Little interest or pleasure in doing things 0   Q2: Feeling down, depressed or hopeless 0   PHQ-2 Score 0   Q1: Little interest or pleasure in doing things Not at all   Q2: Feeling down, depressed or hopeless Not at all   PHQ-2 Score 0           6/9/2024   Substance Use   Alcohol more than 3/day or more than 7/wk No   Do you have a current opioid  prescription? No   How severe/bad is pain from 1 to 10? 2/10   Do you use any other substances recreationally? (!) DECLINE     Social History     Tobacco Use    Smoking status: Former     Current packs/day: 0.00     Average packs/day: 0.2 packs/day for 20.0 years (3.0 ttl pk-yrs)     Types: Cigarettes     Start date: 1985     Quit date: 2005     Years since quittin.4    Smokeless tobacco: Never   Vaping Use    Vaping status: Never Used   Substance Use Topics    Alcohol use: Yes     Comment: wine in the summer occasionally    Drug use: No     Comment: Drug use: No IV drug use           2023   LAST FHS-7 RESULTS   1st degree relative breast or ovarian cancer No    No   Any relative bilateral breast cancer No    No   Any male have breast cancer No    No   Any ONE woman have BOTH breast AND ovarian cancer No    No   Any woman with breast cancer before 50yrs No    No   2 or more relatives with breast AND/OR ovarian cancer No    No   2 or more relatives with breast AND/OR bowel cancer No    No        Mammogram Screening - Mammogram every 1-2 years updated in Health Maintenance based on mutual decision making      History of abnormal Pap smear: No - age 65 or older with adequate negative prior screening test results (3 consecutive negative cytology results, 2 consecutive negative cotesting results, or 2 consecutive negative HrHPV test results within 10 years, with the most recent test occurring within the recommended screening interval for the test used)       ASCVD Risk   The 10-year ASCVD risk score (Rebekah HA, et al., 2019) is: 9.7%    Values used to calculate the score:      Age: 68 years      Sex: Female      Is Non- : No      Diabetic: No      Tobacco smoker: No      Systolic Blood Pressure: 150 mmHg      Is BP treated: No      HDL Cholesterol: 67 mg/dL      Total Cholesterol: 318 mg/dL        Reviewed and updated as needed this visit by Provider   Tobacco   "Allergies  Meds  Problems  Med Hx  Surg Hx  Fam Hx     Sexual Activity        Current providers sharing in care for this patient include:  Patient Care Team:  Sonia Mitchell DO as PCP - General (Internal Medicine)  Pk Polo DO as Mak Grajeda MD as MD (Orthopaedic Surgery)  Tae Ulloa MD as Assigned Musculoskeletal Provider  Sonia Mitchell DO as Assigned PCP    The following health maintenance items are reviewed in Epic and correct as of today:  Health Maintenance   Topic Date Due    RSV VACCINE (Pregnancy & 60+) (1 - 1-dose 60+ series) Never done    DEXA  12/23/2023    MEDICARE ANNUAL WELLNESS VISIT  06/10/2025    LIPID  06/10/2025    TSH W/FREE T4 REFLEX  06/10/2025    FALL RISK ASSESSMENT  06/10/2025    MAMMO SCREENING  12/29/2025    COLORECTAL CANCER SCREENING  11/07/2026    GLUCOSE  06/10/2027    ADVANCE CARE PLANNING  06/10/2029    DTAP/TDAP/TD IMMUNIZATION (3 - Td or Tdap) 12/01/2033    HEPATITIS C SCREENING  Completed    PHQ-2 (once per calendar year)  Completed    INFLUENZA VACCINE  Completed    Pneumococcal Vaccine: 65+ Years  Completed    ZOSTER IMMUNIZATION  Completed    IPV IMMUNIZATION  Aged Out    HPV IMMUNIZATION  Aged Out    MENINGITIS IMMUNIZATION  Aged Out    RSV MONOCLONAL ANTIBODY  Aged Out    COVID-19 Vaccine  Discontinued          Objective    Exam  BP (!) 150/100 (BP Location: Right arm, Patient Position: Sitting, Cuff Size: Adult Large)   Pulse 76   Temp 97.9  F (36.6  C) (Temporal)   Resp 14   Ht 1.638 m (5' 4.5\")   Wt 69.4 kg (153 lb)   LMP 01/01/1993 (Approximate)   SpO2 100%   Breastfeeding No   BMI 25.86 kg/m     Estimated body mass index is 25.86 kg/m  as calculated from the following:    Height as of this encounter: 1.638 m (5' 4.5\").    Weight as of this encounter: 69.4 kg (153 lb).          6/10/2024   Mini Cog   Clock Draw Score 2 Normal   3 Item Recall 3 objects recalled   Mini Cog Total Score 5       Signed Electronically by: Sonia " MERI Mitchell, DO

## 2024-06-10 NOTE — PROGRESS NOTES
Assessment & Plan     Anxiety  Improved at this time, continue off of citalopram and let us know if anything is needed in the future    Gastroesophageal reflux disease without esophagitis  - Controlled.  Continue current regimen.    - esomeprazole (NEXIUM) 40 MG DR capsule; Take 1 capsule (40 mg) by mouth every morning (before breakfast) Take 30-60 minutes before eating.  - Magnesium    Hypothyroidism, unspecified type  TSH has continued to increase.  At this time we discussed altering her medication slightly so that she has a slight increase in her dosage.  New prescriptions are sent in.  We will plan to recheck this in approximately 8 weeks.  She is to call with any issues with the new dosage.  - TSH Reflex GH; Future  - levothyroxine (SYNTHROID/LEVOTHROID) 100 MCG tablet; Take 1 tablet (100 mcg) by mouth three times a week  - levothyroxine (SYNTHROID/LEVOTHROID) 112 MCG tablet; Take 1 tablet (112 mcg) by mouth four times a week    Osteopenia of lumbar spine  With history of osteopenia, recommend repeat DEXA scan  - DEXA HIP/PELVIS/SPINE - Future; Future    Hyperlipidemia, unspecified hyperlipidemia type  Cholesterol has increased significantly.  We discussed starting rosuvastatin she will start with a low-dose.  We will want to increase this over time.  Recommend repeat lipids in approximately 8 weeks.  - rosuvastatin (CRESTOR) 5 MG tablet; Take 1 tablet (5 mg) by mouth daily  - CBC with platelets  - Lipid Profile  - Comprehensive metabolic panel  - Lipid Panel; Future    Elevated random blood glucose level  Random glucose was slightly elevated.  Hemoglobin A1c was within normal range.  Continue to monitor periodically.  - Hemoglobin A1c    Vaccine counseling  -Recommend RSV in the fall.  Pneumonia vaccine given today.  - Pneumococcal 20 Valent Conjugate (PCV20)  - respiratory syncytial virus vaccine, bivalent (ABRYSVO) injection; Inject 0.5 mLs into the muscle once for 1 dose    Balance problem  -With her  ongoing balance issues recommend MRI IAC, referral to neurology and will check B12 today.  - MR Internal Auditory Canal (IAC) w/o & w Contrast; Future  - Adult Neurology  Referral; Future  - Vitamin B12    Pelvic floor dysfunction  We discussed options for pelvic floor dysfunction and urinary incontinence.  At this time she is interested in pursuing physical therapy.  Referral placed.  If at any point she feels she needs additional evaluation we can consider referral for urology or urogyn.  - Physical Therapy  Referral; Future    Urinary incontinence, unspecified type  See above  - Physical Therapy  Referral; Future    Chronic allergic rhinitis  - Controlled.  Continue current regimen.    - ZYRTEC ALLERGY 10 MG tablet; Take 1 tablet (10 mg) by mouth daily    Asymptomatic menopausal state  - DEXA HIP/PELVIS/SPINE - Future; Future    Need for pneumococcal vaccination  - Pneumococcal 20 Valent Conjugate (PCV20)    KUMAR (dyspnea on exertion)  With her dyspnea on exertion along with her elevated cholesterol and family history of heart disease recommend stress testing in case this is an anginal equivalent.  She will be contacted with results when available.  - Echo Stress Test with Definity; Future    Elevated liver enzymes  Liver tests are elevated.  May be related to elevated cholesterol.  She is started on statin therapy, will try to avoid alcohol, work on weight loss and we will plan to recheck with next labs.  - Hepatic Function Panel; Future    Elevated blood-pressure reading without diagnosis of hypertension  -Given that her blood pressure is on the low normal side at home we will continue to monitor.  She is to continue to check it on a regular basis and contact us with readings in the future.    Patient has been advised of split billing requirements and indicates understanding: Yes        Counseling  Appropriate preventive services were discussed with this patient, including applicable  "screening as appropriate for fall prevention, nutrition, physical activity, Tobacco-use cessation, weight loss and cognition.  Checklist reviewing preventive services available has been given to the patient.  Reviewed patient's diet, addressing concerns and/or questions.   She is at risk for psychosocial distress and has been provided with information to reduce risk.   Discussed possible causes of fatigue. Information on urinary incontinence and treatment options given to patient.       Return in about 1 year (around 6/10/2025) for Annual Review with renewal of all medications, and as needed sooner.    Herrera Watson is a 68 year old, presenting for the following health issues:  Medicare Visit      6/10/2024     9:22 AM   Additional Questions   Roomed by CANDY Mccauley   Accompanied by YAJAIRA MENDEZ     Patient reports that overall she is doing okay.  She has a few concerns she would like addressed today.    She continues to have issues with balance.  She describes this as a \"disconnect between her brain and her legs.  She also feels she has had some trembling in her legs.  She has had some pulsating in her neck and also some dizziness off and on.  She has been trying to stay hydrated.  She denies any ringing in her ears or hearing loss.  She feels her symptoms have been going on for the last 3 years.  She denies any shuffling with her gait but she has been tripping more.  She did have some vertigo prior which was helped with Epley maneuvers however her most recent symptoms or not helped with this.    She also reports that she has been having some issues with dyspnea on exertion and some \"squeezing\" sensation on her left side.  She is concerned about cardiac disease.  She does have a history of diabetes and heart disease in the family.  She has gained some weight which she is frustrated with.  She does have known elevated cholesterol and is interested in possibly thinking about medication for this.  She does not feel " "that this is any worse with exercise.    She continues on some medications and does need refills at this time.  She is concerned that her thyroid is on the low side.  She did feel better when it was slightly high.  We discussed the pros and cons of this.  She continues on Nexium and does need a refill of this.  She also would like a prescription for her Zyrtec.    She reports that her blood pressures have been on the low side at home.  She reports 90s/50s.  Blood pressure is significantly elevated today compared to that.    ROS:  CONSTITUTIONAL: Negative for fever, chills, night sweats, significant change in weight but gradual increase over a couple years  INTEGUMENTARY/SKIN/LYMPH: Negative for worrisome rashes, moles or lesions; swollen lymph nodes - sees Derm  EYES: Negative for significant vision changes or irritation  ENT: Negative for additional ear, mouth and throat problems  RESP: Negative for significant cough or SOB  CV: Negative for increased peripheral edema; some palpitations off and on  GI: Negative for abdominal pain, or change in bowel habits or blood in the stools/black stools  : Negative for unusual urinary or vaginal symptoms. No vaginal bleeding.  MUSCULOSKELETAL: Negative for new or changing joint pain or significant swelling  NEURO: Negative for new headaches, weakness or paraesthesias; slight trembling in legs  PSYCHIATRIC: Negative for changes in mood or affect; significant anxiety or depression        Objective    BP (!) 150/100 (BP Location: Right arm, Patient Position: Sitting, Cuff Size: Adult Large)   Pulse 76   Temp 97.9  F (36.6  C) (Temporal)   Resp 14   Ht 1.638 m (5' 4.5\")   Wt 69.4 kg (153 lb)   LMP 01/01/1993 (Approximate)   SpO2 100%   Breastfeeding No   BMI 25.86 kg/m    Body mass index is 25.86 kg/m .  Physical Exam   GEN: Vitals reviewed. Healthy appearing. Patient is in no acute distress. Cooperative with exam.  HEENT: Normocephalic atraumatic.  Eyes grossly normal " to inspection.  No discharge or erythema, or obvious scleral/conjunctival abnormalities.   NECK: Supple; no thyromegaly or masses noted.  No cervical or supraclavicular lymphadenopathy.  CV: Heart regular in rate and rhythm with no murmur.    LUNGS: No audible wheeze, cough, or visible cyanosis.  No visible retractions or increased work of breathing.  Lungs clear to auscultation bilaterally.    ABD:  Nondistended  SKIN: Warm and dry to touch.  Visible skin clear. No significant rash, abnormal pigmentation or lesions.  EXT: No clubbing or cyanosis.  No peripheral edema.  NEURO: Alert and oriented to person, place, and time.  Cranial nerves II-XII grossly intact with no focal or lateralizing deficits.  Muscle tone normal.  Gait normal. No tremor.   MSK: ROM of upper and lower ext symmetric and full.  PSYCH: Mood is good.  Mentation appears normal, affect normal/bright, judgement and insight intact, normal speech and appearance well-groomed.          Signed Electronically by: Sonia Mitchell,

## 2024-06-11 RX ORDER — LEVOTHYROXINE SODIUM 112 UG/1
112 TABLET ORAL
Qty: 48 TABLET | Refills: 0 | Status: SHIPPED | OUTPATIENT
Start: 2024-06-11 | End: 2024-09-11

## 2024-06-11 RX ORDER — LEVOTHYROXINE SODIUM 100 UG/1
100 TABLET ORAL
Qty: 36 TABLET | Refills: 0 | Status: SHIPPED | OUTPATIENT
Start: 2024-06-12 | End: 2024-07-15

## 2024-06-13 ENCOUNTER — HOSPITAL ENCOUNTER (OUTPATIENT)
Dept: MRI IMAGING | Facility: OTHER | Age: 69
Discharge: HOME | End: 2024-06-13
Attending: INTERNAL MEDICINE | Admitting: INTERNAL MEDICINE
Payer: MEDICARE

## 2024-06-13 DIAGNOSIS — R26.89 BALANCE PROBLEM: ICD-10-CM

## 2024-06-13 PROCEDURE — 70543 MRI ORBT/FAC/NCK W/O &W/DYE: CPT | Mod: MG

## 2024-06-13 PROCEDURE — 255N000002 HC RX 255 OP 636: Mod: JZ | Performed by: INTERNAL MEDICINE

## 2024-06-13 PROCEDURE — A9575 INJ GADOTERATE MEGLUMI 0.1ML: HCPCS | Mod: JZ | Performed by: INTERNAL MEDICINE

## 2024-06-13 RX ORDER — GADOTERATE MEGLUMINE 376.9 MG/ML
15 INJECTION INTRAVENOUS ONCE
Status: COMPLETED | OUTPATIENT
Start: 2024-06-13 | End: 2024-06-13

## 2024-06-13 RX ADMIN — GADOTERATE MEGLUMINE 14 ML: 376.9 INJECTION INTRAVENOUS at 16:31

## 2024-06-19 ENCOUNTER — TELEPHONE (OUTPATIENT)
Dept: INTERNAL MEDICINE | Facility: OTHER | Age: 69
End: 2024-06-19
Payer: MEDICARE

## 2024-06-19 DIAGNOSIS — R06.09 DOE (DYSPNEA ON EXERTION): Primary | ICD-10-CM

## 2024-06-19 NOTE — TELEPHONE ENCOUNTER
Code 70949-gi Denied by [email or web address] due to medical necessity. Reference # [id number]  You must have one of the following-     Findings on a tracing of your heart's electrical activity ( ECG) that could make it hard to tell if your heart is getting enough blood supply with exercise.      Results of an ECG and blood pressure check done before, during, and afgter walking on a treadmill(exercise stress test) wer unclear (inconlusive) or not normal as defined in the guideline.   Faxed to [facility] for scanning.    Phone [phone] to speak to

## 2024-06-19 NOTE — TELEPHONE ENCOUNTER
It appears that the Transthoracic Echo has been denied.  See denial note.  Eliana Oropeza LPN on 6/19/2024 at 11:04 AM

## 2024-06-21 NOTE — TELEPHONE ENCOUNTER
I am unsure why this isn't covered however I will put in an order for a different type of stress test and hopefully they will cover that. Please notify patient that they should be calling to schedule a non-exercise based test.

## 2024-07-10 DIAGNOSIS — E03.9 HYPOTHYROIDISM, UNSPECIFIED TYPE: ICD-10-CM

## 2024-07-11 ENCOUNTER — TELEPHONE (OUTPATIENT)
Dept: INTERNAL MEDICINE | Facility: OTHER | Age: 69
End: 2024-07-11
Payer: MEDICARE

## 2024-07-11 DIAGNOSIS — I10 PRIMARY HYPERTENSION: ICD-10-CM

## 2024-07-11 DIAGNOSIS — R94.31 NONSPECIFIC ABNORMAL ELECTROCARDIOGRAM (ECG) (EKG): Primary | ICD-10-CM

## 2024-07-11 DIAGNOSIS — E78.5 HYPERLIPIDEMIA, UNSPECIFIED HYPERLIPIDEMIA TYPE: ICD-10-CM

## 2024-07-11 DIAGNOSIS — R06.09 DOE (DYSPNEA ON EXERTION): ICD-10-CM

## 2024-07-11 NOTE — TELEPHONE ENCOUNTER
Patient's Lexiscan that was ordered had one code that required prior auth-  CPT code 95553- it was Denied on [employer] website. Reference # [id number]  Does not meet medical necessity.     Appeal information Fax [phone]  [facility]  Attn: Clinical Appeals  [address] [place] [address]    Physician can all call [phone] to speak to a .

## 2024-07-12 NOTE — TELEPHONE ENCOUNTER
I again called the number provided and was transferred multiple times and after 15 minutes the call was again disconnected.  Please try placing the order again to see if it will go through.  Eliana Oropeza LPN on 7/12/2024 at 3:26 PM

## 2024-07-12 NOTE — TELEPHONE ENCOUNTER
Noted.  Unless we hear from the insurance company or the pt , this message will be closed for now.  Eliana Oropeza LPN on 7/12/2024 at 3:52 PM

## 2024-07-12 NOTE — TELEPHONE ENCOUNTER
I attempted to call the insurance company and was transferred multiple times and still not able to talk with a live representative.  Eliana Oropeza LPN on 7/12/2024 at 2:15 PM

## 2024-07-12 NOTE — TELEPHONE ENCOUNTER
Please verify- patient was ordered a stress echo- not a lexiscan. We can place  a new order and send again if needed

## 2024-07-15 RX ORDER — LEVOTHYROXINE SODIUM 100 UG/1
100 TABLET ORAL DAILY
Qty: 90 TABLET | Refills: 0 | Status: SHIPPED | OUTPATIENT
Start: 2024-07-15

## 2024-07-15 NOTE — TELEPHONE ENCOUNTER
[id number] sent Rx request for the following:      Requested Prescriptions   Pending Prescriptions Disp Refills    levothyroxine (SYNTHROID/LEVOTHROID) 100 MCG tablet [Pharmacy Med Name: Levothyroxine Sodium 100 MCG Oral Tablet] 90 tablet 0     Sig: Take 1 tablet by mouth once daily       Thyroid Protocol Passed - 7/15/2024  3:20 PM        Passed - Patient is 12 years or older        Passed - Recent (12 mo) or future (30 days) visit within the authorizing provider's specialty     The patient must have completed an in-person or virtual visit within the past 12 months or has a future visit scheduled within the next 90 days with the authorizing provider s specialty.  Urgent care and e-visits do not quality as an office visit for this protocol.          Passed - Medication is active on med list        Passed - Medication indicated for associated diagnosis     Medication is associated with one or more of the following diagnoses:  Hypothyroidism  Thyroid stimulating hormone suppression therapy  Thyroid cancer          Passed - Normal TSH on file in past 12 months     Recent Labs   Lab Test 06/10/24  1038   TSH 2.82              Passed - No active pregnancy on record     If patient is pregnant or has had a positive pregnancy test, please check TSH.          Passed - No positive pregnancy test in past 12 months     If patient is pregnant or has had a positive pregnancy test, please check TSH.               Last Prescription Date:   6/12/24  Last Fill Qty/Refills:         36, R-0    Last Office Visit:              6/10/24   Future Office visit:           6/11/25    Prescription approved per [facility] Refill Protocol.    Nenita Gifford RN on 7/15/2024 at 3:22 PM

## 2024-07-19 ENCOUNTER — HOSPITAL ENCOUNTER (OUTPATIENT)
Dept: BONE DENSITY | Facility: OTHER | Age: 69
Discharge: HOME | End: 2024-07-19
Attending: INTERNAL MEDICINE | Admitting: INTERNAL MEDICINE
Payer: MEDICARE

## 2024-07-19 DIAGNOSIS — M85.88 OSTEOPENIA OF LUMBAR SPINE: ICD-10-CM

## 2024-07-19 DIAGNOSIS — Z78.0 ASYMPTOMATIC MENOPAUSAL STATE: ICD-10-CM

## 2024-07-19 PROCEDURE — 77080 DXA BONE DENSITY AXIAL: CPT

## 2024-08-02 ENCOUNTER — NURSE TRIAGE (OUTPATIENT)
Dept: INTERNAL MEDICINE | Facility: OTHER | Age: 69
End: 2024-08-02

## 2024-08-02 ENCOUNTER — MYC MEDICAL ADVICE (OUTPATIENT)
Dept: INTERNAL MEDICINE | Facility: OTHER | Age: 69
End: 2024-08-02
Payer: MEDICARE

## 2024-08-02 ENCOUNTER — VIRTUAL VISIT (OUTPATIENT)
Dept: INTERNAL MEDICINE | Facility: OTHER | Age: 69
End: 2024-08-02
Attending: INTERNAL MEDICINE
Payer: MEDICARE

## 2024-08-02 DIAGNOSIS — U07.1 INFECTION DUE TO 2019 NOVEL CORONAVIRUS: Primary | ICD-10-CM

## 2024-08-02 PROCEDURE — 99214 OFFICE O/P EST MOD 30 MIN: CPT | Mod: 95 | Performed by: INTERNAL MEDICINE

## 2024-08-02 RX ORDER — CODEINE PHOSPHATE AND GUAIFENESIN 10; 100 MG/5ML; MG/5ML
1-2 SOLUTION ORAL
Qty: 180 ML | Refills: 0 | Status: SHIPPED | OUTPATIENT
Start: 2024-08-02 | End: 2024-09-20

## 2024-08-02 RX ORDER — DEXAMETHASONE 4 MG/1
4 TABLET ORAL
Qty: 3 TABLET | Refills: 0 | Status: SHIPPED | OUTPATIENT
Start: 2024-08-02 | End: 2024-09-20

## 2024-08-02 RX ORDER — ALBUTEROL SULFATE 90 UG/1
2 INHALANT RESPIRATORY (INHALATION) EVERY 6 HOURS PRN
Qty: 18 G | Refills: 0 | Status: CANCELLED | OUTPATIENT
Start: 2024-08-02

## 2024-08-02 RX ORDER — CODEINE PHOSPHATE AND GUAIFENESIN 10; 100 MG/5ML; MG/5ML
1-2 SOLUTION ORAL EVERY 4 HOURS PRN
Qty: 180 ML | Refills: 0 | Status: CANCELLED | OUTPATIENT
Start: 2024-08-02

## 2024-08-02 RX ORDER — BENZONATATE 200 MG/1
200 CAPSULE ORAL 3 TIMES DAILY PRN
Qty: 90 CAPSULE | Refills: 1 | Status: SHIPPED | OUTPATIENT
Start: 2024-08-02 | End: 2024-09-20

## 2024-08-02 NOTE — PROGRESS NOTES
"Shirley is a 68 year old who is being evaluated via a billable video visit.    How would you like to obtain your AVS? MyChart  If the video visit is dropped, the invitation should be resent by: Text to cell phone: [phone]  Will anyone else be joining your video visit? No      Assessment & Plan     ICD-10-CM    1. Infection due to 2019 novel coronavirus  U07.1 dexAMETHasone (DECADRON) 4 MG tablet     benzonatate (TESSALON) 200 MG capsule     guaiFENesin-codeine (ROBITUSSIN AC) 100-10 MG/5ML solution         Patient presents for + COVID test at home, shortness of breath.     is also sick.  They were visiting a friend who got exposed and both Shirley and her  got sick with COVID.    Was doing reasonly well up until last night when she felt like she got more ill with worsening cough.    Had tried some Coricidin which helped a little.  Had some plain Robitussin which did not help at all.  Would like to get something to help with her cough.  Prescription for Tessalon Perles and Robitussin AC sent to pharmacy.  3 tablets of 4 mg Decadron also sent to pharmacy.  Declines significant wheezing or reactive airway symptoms.  Advised that she monitor her oxygen saturations and to let us know if  her oxygen saturations drop to 88% or lower.  At that point would need to consider being evaluated in the emergency room.      Close follow-up as needed.  Encouraged adequate oral hydration.             COVID-19 positive patient.  Encounter for consideration of medication intervention. Patient does qualify for a prescription. Full discussion with patient including medication options, risks and benefits. Potential drug interactions reviewed with patient.     Treatment Planned symptomatic medications -    Temporary change to home medications:  None     Estimated body mass index is 25.86 kg/m  as calculated from the following:    Height as of 6/10/24: 1.638 m (5' 4.5\").    Weight as of 6/10/24: 69.4 kg (153 lb).  GFR Estimate "   Date Value Ref Range Status   06/10/2024 >90 >60 mL/min/1.73m2 Final   11/19/2020 80 >60 mL/min/[1.73_m2] Final     Lab Results   Component Value Date    [id number] Negative 01/16/2022       No follow-ups on file.      Ambrose Morse MD  [facility] AND [facility]    Review of Systems      Subjective   Shirley is a 68 year old, presenting for the following health issues:  RECHECK (Covid symptoms)        8/2/2024    11:23 AM   Additional Questions   Roomed by Elsy HUYNH     Video Start Time: 12:35 PM    History of Present Illness       Reason for visit:  Covid symptoms  Symptom onset:  3-7 days ago  Symptoms include:  Excessive coughing, fatigue, chest tightness and loss of appetite  Symptom intensity:  Moderate  Symptom progression:  Worsening  Had these symptoms before:  No  What makes it worse:  No  What makes it better:  Rest and soothing liquids for my throat    She eats 2-3 servings of fruits and vegetables daily.She consumes 0 sweetened beverage(s) daily.She exercises with enough effort to increase her heart rate 30 to 60 minutes per day.  She exercises with enough effort to increase her heart rate 5 days per week.   She is taking medications regularly.         COVID-19 Symptom Review  How many days ago did these symptoms start? 5 -- Started on Monday 7/29.    Are any of the following symptoms significant for you?  New or worsening difficulty breathing? Yes  Please describe what kind of difficulty you are having breathing:Mild dyspnea (able to do ADLs without difficulty, mild shortness of breath with activities such as climbing one or two flights of stairs or walking briskly)  Worsening cough? Yes, I am coughing up mucus.  Fever or chills? Unsure  Headache: YES  Sore throat: YES  Chest pain: No  Diarrhea: YES  Body aches? YES    What treatments has patient tried? Acetaminophen and Cold relief for people with high blood pressure    Does patient live in a nursing home, group home, or shelter? No  Does  patient have a way to get food/medications during quarantined? Yes, I have a friend or family member who can help me.                Objective    Vitals - Patient Reported  Pain Score: No Pain (0)      Vitals:  No vitals were obtained today due to virtual visit.    Physical Exam   GENERAL: alert and no distress  RESP: No audible wheeze. Intermittent cough.    NEURO: Mentation and speech appropriate for age.  PSYCH: Appropriate affect, tone, and pace of words          Video-Visit Details    Type of service:  Video Visit   Video End Time:12:44 PM  Originating Location (pt. Location): Home    Distant Location (provider location):  On-site  Platform used for Video Visit: Brook  Signed Electronically by: Ambrose Morse MD

## 2024-08-02 NOTE — NURSING NOTE
"Chief Complaint   Patient presents with    RECHECK     Covid symptoms   Patient presents for a video visit today for Covid Symptoms    Initial LMP 01/01/1993 (Approximate)  Estimated body mass index is 25.86 kg/m  as calculated from the following:    Height as of 6/10/24: 1.638 m (5' 4.5\").    Weight as of 6/10/24: 69.4 kg (153 lb).  Meds Reconciled: complete      Elsy Calloway LPN,LPN on 8/2/2024 at 11:24 AM  Ext. [phone]        Elsy Calloway LPN  "

## 2024-08-02 NOTE — TELEPHONE ENCOUNTER
My Chart to Triage for chest tightness:      Good morning   I had a positive Covid test yesterday with an at home test kit.  I'm experiencing tightness in my chest and a cough that no OTC seems to touch.  I was hoping you could prescribe a cough syrup with codeine that will help with the excessive cough.  and to get much needed rest.   I think I might need an inhaler as well to help with the tightness in my chest.     Thank you in advance for your help  Shirley Hollins      Turned Publish2 message into traige encounter.      Routed to Unit 1 Care Team RN for triage pool.    Updated patient on Publish2.        Arina Fuentes RN on 8/2/2024 at 9:11 AM

## 2024-08-02 NOTE — TELEPHONE ENCOUNTER
Dr. Morse,   Please review triage assessment. Patient is requesting Robitussin AC and an albuterol inhaler to manage symptoms of chest tightness and cough.   Nicki Pablo RN on 8/2/2024 at 9:47 AM      Reason for Disposition   MILD difficulty breathing (e.g., minimal/no SOB at rest, SOB with walking, pulse <100)    Additional Information   Negative: SEVERE difficulty breathing (e.g., struggling for each breath, speaks in single words)   Negative: Difficult to awaken or acting confused (e.g., disoriented, slurred speech)   Negative: Bluish (or gray) lips or face now   Negative: Shock suspected (e.g., cold/pale/clammy skin, too weak to stand, low BP, rapid pulse)   Negative: Sounds like a life-threatening emergency to the triager   Negative: Diagnosed or suspected COVID-19 and symptoms lasting 3 or more weeks   Negative: COVID-19 exposure and no symptoms   Negative: COVID-19 vaccine reaction suspected (e.g., fever, headache, muscle aches) occurring 1 to 3 days after getting vaccine   Negative: COVID-19 vaccine, questions about   Negative: Lives with someone known to have influenza (flu test positive) and flu-like symptoms (e.g., cough, runny nose, sore throat, SOB; with or without fever)   Negative: Possible COVID-19 symptoms and triager concerned about severity of symptoms or other causes   Negative: COVID-19 and breastfeeding, questions about   Negative: SEVERE or constant chest pain or pressure  (Exception: Mild central chest pain, present only when coughing.)   Negative: MODERATE difficulty breathing (e.g., speaks in phrases, SOB even at rest, pulse 100-120)   Negative: Headache and stiff neck (can't touch chin to chest)   Negative: Oxygen level (e.g., pulse oximetry) 90% or lower   Negative: Drinking very little and dehydration suspected (e.g., no urine > 12 hours, very dry mouth, very lightheaded)   Negative: Patient sounds very sick or weak to the triager   Negative: Chest pain or pressure  (Exception: MILD  "central chest pain, present only when coughing.)   Negative: Fever > 103 F (39.4 C)   Negative: Fever > 101 F (38.3 C) and over 60 years of age   Negative: Fever > 100.0 F (37.8 C) and bedridden (e.g., CVA, chronic illness, recovering from surgery)    Answer Assessment - Initial Assessment Questions  1. COVID-19 DIAGNOSIS: \"How do you know that you have COVID?\" (e.g., positive lab test or self-test, diagnosed by doctor or NP/PA, symptoms after exposure).      Tested positive yesterday with a home test     2. COVID-19 EXPOSURE: \"Was there any known exposure to COVID before the symptoms began?\" [facility] Definition of close contact: within 6 feet (2 meters) for a total of 15 minutes or more over a 24-hour period.       Denies - they were in [place] and a friend tested positive after the face     3. ONSET: \"When did the COVID-19 symptoms start?\"       Monday     4. WORST SYMPTOM: \"What is your worst symptom?\" (e.g., cough, fever, shortness of breath, muscle aches)      Yesterday felt like the cough became severe along with shortness of breath     5. COUGH: \"Do you have a cough?\" If Yes, ask: \"How bad is the cough?\"        Severe     6. FEVER: \"Do you have a fever?\" If Yes, ask: \"What is your temperature, how was it measured, and when did it start?\"      Denies     7. RESPIRATORY STATUS: \"Describe your breathing?\" (e.g., normal; shortness of breath, wheezing, unable to speak)       Tightness/constriction in her chest with any exertion     8. BETTER-SAME-WORSE: \"Are you getting better, staying the same or getting worse compared to yesterday?\"  If getting worse, ask, \"In what way?\"      A lot worse today than yesterday     9. OTHER SYMPTOMS: \"Do you have any other symptoms?\"  (e.g., chills, fatigue, headache, loss of smell or taste, muscle pain, sore throat)      Headache from coughing hard, fatigue     10. HIGH RISK DISEASE: \"Do you have any chronic medical problems?\" (e.g., asthma, heart or lung disease, weak immune system, " "obesity, etc.)        Denies     11. VACCINE: \"Have you had the COVID-19 vaccine?\" If Yes, ask: \"Which one, how many shots, when did you get it?\"        It has been a couple of years since getting them     12. PREGNANCY: \"Is there any chance you are pregnant?\" \"When was your last menstrual period?\"        No     13. O2 SATURATION MONITOR:  \"Do you use an oxygen saturation monitor (pulse oximeter) at home?\" If Yes, ask \"What is your reading (oxygen level) today?\" \"What is your usual oxygen saturation reading?\" (e.g., 95%)        denies    Protocols used: Coronavirus (COVID-19) Diagnosed or [id number]    "

## 2024-08-02 NOTE — TELEPHONE ENCOUNTER
Will schedule video visit for today.     Electronically signed by:  Ambrose Morse MD  on August 2, 2024 11:22 AM

## 2024-08-02 NOTE — TELEPHONE ENCOUNTER
Turned [employer] message into traige encounter.      Routed to Unit 1 Care Team RN for triage pool.    Updated patient on CleanScapes.        Arina Fuentes RN on 8/2/2024 at 9:12 AM

## 2024-08-16 NOTE — PROGRESS NOTES
Assessment & Plan     Encounter for follow-up examination  Dizziness  We will get her set up with PT vestibular therapy and chiropractic treatment to see if we can alleviate some of her issues. We will try a daily antihistamine also.  - Physical Therapy Referral, vestibular and possibly working on her neck.  - cetirizine (ZYRTEC) 10 MG tablet    High risk medication use  We will recheck labs today as she reports she is voiding quite a bit and was on the low end of normal in ED for her sodium.  Patient will continue her low-dose amlodipine.  Follow-up upon return from her vacation.  - Basic Metabolic Panel, unremarkable    Ordering of each unique test  Prescription drug management  25 minutes spent on the date of the encounter doing chart review, history and exam, documentation and further activities per the note     CONSULTATION/REFERRAL to PT and chiropractor.    Return if symptoms worsen or fail to improve; recheck upon return from trip.    Cherelle Dean NP  [facility] AND [facility]    Herrera Watson is a 66 year old who presents for the following health issues :  ER F/U for dizziness and B/P    History of Present Illness       Hypertension: She presents for follow up of hypertension.  She does check blood pressure  regularly outside of the clinic. Outside blood pressures have been over 140/90. She follows a low salt diet.     She eats 2-3 servings of fruits and vegetables daily.She consumes 0 sweetened beverage(s) daily.She exercises with enough effort to increase her heart rate 30 to 60 minutes per day.  She exercises with enough effort to increase her heart rate 4 days per week.   She is taking medications regularly.     She has never had issues with blood pressure in the past. Was found to have high cholesterol at last lab check and has been working very hard to exercise and has changed to vegan diet to assist in lowering these numbers. She is exercising frequently, drinking plenty of water.  Subjective   Patient ID: Indira Paniagua is a 9 year old female who is accompanied by:mother     Concerns: none    Being followed by nephrology for recurrent UTIs  Has appointment scheduled with allergy this fall    HPI  School  Grade: 2d [employer]  Performance: came home with her [place]; did well last year    Activity:  Screen time daily 1-2 Hours  Physical activity daily Yes  Reading Yes  Classes/sports: camp, swimming    Development:  Doing well in school  Yes  Reading at grade level  Yes  Has friends  Yes  Engages in hobbies Yes   Interacts positively with adults and others  Yes  Acknowledges limits and consequences  Yes  Handles anger constructively  Yes  Manages conflicts appropriately  Yes  Participates in chores  Yes     Social:   Lives with: mother and father, uncle temporarily    Diet  3 meals per day and 2 healthy snacks: Yes  Fruits and Vegetable Intake - good  Protein Intake - good  Dairy Intake - good  Sugary Drinks - Often but watered down  Water: Yes    Dental:   Brushing teeth 2x daily  Seeing dentist 2x yearly  Issues: none    BEARS Sleep Screening Tool  Bedtime problems  -Trouble falling asleep or going to bed? no  Excessive daytime sleepiness  -Tired during the day? no  -Difficulty waking in the morning? no  -Taking naps during the day? no  Awakenings during the night  -Wakes up a lot at night? no  -Trouble getting back to sleep? no  -Sleepwalking or nightmares? no  Regularity and duration of sleep  -Regular bedtime? Yes  -Regular wakeup time? Yes  -Where does your child sleep? In her own room  Sleep-disordered breathing  -Snoring? no  -Difficulty breathing at night? no     Elimination  Constipation - Yes is taking miralax daily  Nighttime wetting - Yes; being followed by nephro for chronic UTI    Safety:  Booster seat: no  Seat belt: Yes  Bicycle Helmet:  Yes   Water safety: Yes  Sun protection: Yes  Insect repellent: Yes  Smoke detectors: Yes  CO detectors: Yes  Guns:  Reports no ill effects from recently started amlodipine.     ED/UC Followup:    Facility:  [facility]  Date of visit: 12/28/21  Reason for visit: lightheadedness, elevated blood pressure         New Prescriptions     AMLODIPINE (NORVASC) 2.5 MG TABLET    Take 1 tablet (2.5 mg) by mouth daily      Patient feeling much better over the course of her ER stay with decreasing blood pressures.  I do feel like her lightheadedness is likely multifactorial as she does appear to have some eustachian tube dysfunction and however I do feel like elevated blood pressures were contributing to her symptoms as well.  Trial of Norvasc at home.  She was given Klonopin x1 in the ED for anxiety which she is prescribed that at home.  She did tolerate Norvasc well in the ED as well.  Evaluation otherwise was unremarkable.  Return to the emergency department chest pain, shortness of breath, strokelike symptoms, visual changes, weakness nausea vomiting abdominal pain or fever.    Current Status: still super dizzy, left side of her neck and left ear is sore.     Review of Systems   Constitutional, HEENT, cardiovascular, pulmonary, gi and gu systems are negative, except as otherwise noted.      Objective    /82 (BP Location: Right arm, Patient Position: Sitting, Cuff Size: Adult Regular)   Pulse 84   Temp 97.5  F (36.4  C) (Tympanic)   Resp 16   Wt 67.2 kg (148 lb 3.2 oz)   LMP  (LMP Unknown)   SpO2 100%   Breastfeeding No   BMI 24.66 kg/m    Body mass index is 24.66 kg/m .     Physical Exam   GENERAL: healthy, alert and no distress  EYES: Eyes grossly normal to inspection, PERRL and conjunctivae and sclerae normal  HENT: ear canals and TM's normal, nose and mouth without ulcers or lesions  NECK: no adenopathy, no asymmetry, masses, or scars and thyroid normal to palpation  RESP: lungs clear to auscultation - no rales, rhonchi or wheezes  BREAST: normal without masses, tenderness or nipple discharge and no palpable axillary masses or  no    Pediatric Symptom Checklist:  Attention Subscale: 0 normal  Internalizing Subscale: 0 normal  Externalizing Subscale: 0 normal  Total Score: 1 normal    Review of Systems   Constitutional: Negative.    HENT: Negative.     Eyes: Negative.    Respiratory: Negative.     Cardiovascular: Negative.    Gastrointestinal: Negative.    Endocrine: Negative.    Genitourinary: Negative.    Musculoskeletal: Negative.    Skin: Negative.    Allergic/Immunologic: Negative for environmental allergies and food allergies.   Neurological: Negative.    Hematological: Negative.    Psychiatric/Behavioral: Negative.     All other systems reviewed and are negative.      Objective   Vitals: /72   Pulse 110   Temp 97.5 °F (36.4 °C) (Temporal)   Ht 4' 0.9\" (1.242 m)   Wt 24.7 kg (54 lb 8 oz)   BMI 16.03 kg/m²   BSA 0.93 m²   Growth parameters are noted and are appropriate for age.    Physical Exam  Vitals and nursing note reviewed. Exam conducted with a chaperone present.   Constitutional:       General: She is active.      Appearance: Normal appearance. She is well-developed.   HENT:      Head: Normocephalic and atraumatic.      Jaw: There is normal jaw occlusion.      Right Ear: Tympanic membrane, ear canal and external ear normal.      Left Ear: Tympanic membrane, ear canal and external ear normal.      Nose: Nose normal.      Mouth/Throat:      Lips: Pink. No lesions.      Mouth: Mucous membranes are moist. No oral lesions.      Dentition: No dental caries.      Pharynx: Oropharynx is clear. Uvula midline.      Tonsils: No tonsillar exudate. 0 on the right. 0 on the left.      Neck: Full passive range of motion without pain, normal range of motion and neck supple.   Eyes:      General: Visual tracking is normal. Lids are normal.         Right eye: No discharge.         Left eye: No discharge.      Extraocular Movements: Extraocular movements intact.      Conjunctiva/sclera: Conjunctivae normal.      Pupils: Pupils are  "adenopathy  CV: regular rate and rhythm, normal S1 S2, no S3 or S4, no murmur, click or rub, no peripheral edema and peripheral pulses strong  ABDOMEN: soft, nontender, no hepatosplenomegaly, no masses and bowel sounds normal  MS: no gross musculoskeletal defects noted, no edema  SKIN: no suspicious lesions or rashes  NEURO: Normal strength and tone, mentation intact and speech normal  PSYCH: mentation appears normal, affect normal/bright    Results for orders placed or performed in visit on 01/04/22   Basic Metabolic Panel     Status: Normal   Result Value Ref Range    Sodium 138 134 - 144 mmol/L    Potassium 4.0 3.5 - 5.1 mmol/L    Chloride 100 98 - 107 mmol/L    Carbon Dioxide (CO2) 30 21 - 31 mmol/L    Anion Gap 8 3 - 14 mmol/L    Urea Nitrogen 12 7 - 25 mg/dL    Creatinine 0.67 0.60 - 1.20 mg/dL    Calcium 10.0 8.6 - 10.3 mg/dL    Glucose 90 70 - 105 mg/dL    GFR Estimate >90 >60 mL/min/1.73m2       Nursing Notes:   Kimberlyn Escobar LPN  1/4/2022  9:47 AM  Signed  Chief Complaint   Patient presents with     RECHECK     ER F/U; B/P and dizziness   Follow up from ER for dizziness, low B/P; patient states that she thought B/P was low because of dizziness, when in fact B/P was high.   Patient states today that she has off and on dizziness, L sided neck pain, L ear feels plugged.  Patient states in ER she was told ear was retracted.    Initial /82 (BP Location: Right arm, Patient Position: Sitting, Cuff Size: Adult Regular)   Pulse 84   Temp 97.5  F (36.4  C) (Tympanic)   Resp 16   Wt 67.2 kg (148 lb 3.2 oz)   LMP  (LMP Unknown)   SpO2 100%   Breastfeeding No   BMI 24.66 kg/m   Estimated body mass index is 24.66 kg/m  as calculated from the following:    Height as of 12/28/21: 1.651 m (5' 5\").    Weight as of this encounter: 67.2 kg (148 lb 3.2 oz).     Medication Reconciliation: complete      FOOD SECURITY SCREENING QUESTIONS:    The next two questions are to help us understand your food security.  If " equal, round, and reactive to light.      Funduscopic exam:     Right eye: Red reflex present.         Left eye: Red reflex present.  Neck:      Trachea: Trachea and phonation normal.   Cardiovascular:      Rate and Rhythm: Normal rate and regular rhythm.      Pulses: Normal pulses. Pulses are strong.           Femoral pulses are 2+ on the right side and 2+ on the left side.     Heart sounds: Normal heart sounds, S1 normal and S2 normal. No murmur heard.  Pulmonary:      Effort: Pulmonary effort is normal.      Breath sounds: Normal breath sounds and air entry.   Abdominal:      General: Abdomen is flat. Bowel sounds are normal. There is no distension.      Palpations: Abdomen is soft. There is no mass.      Tenderness: There is no abdominal tenderness.      Hernia: No hernia is present.   Genitourinary:     General: Normal vulva.      Chano stage (genital): 1.   Musculoskeletal:         General: No deformity. Normal range of motion.      Thoracic back: Normal. No deformity.      Lumbar back: Normal. No deformity.   Lymphadenopathy:      Cervical: No cervical adenopathy.   Skin:     General: Skin is warm and dry.      Capillary Refill: Capillary refill takes less than 2 seconds.      Findings: No bruising or rash.   Neurological:      General: No focal deficit present.      Mental Status: She is alert and oriented for age.      Sensory: Sensation is intact.      Motor: Motor function is intact.      Coordination: Coordination is intact.      Gait: Gait is intact.   Psychiatric:         Attention and Perception: Attention and perception normal.         Mood and Affect: Mood and affect normal.         Speech: Speech normal.         Behavior: Behavior normal. Behavior is cooperative.         Thought Content: Thought content normal.         Cognition and Memory: Cognition and memory normal.         Judgment: Judgment normal.         Assessment   Problem List Items Addressed This Visit    None  Visit Diagnoses      Encounter for routine child health examination without abnormal findings    -  Primary          PLAN:  Immunizations: UP TO DATE    Counseling  Weight management:  The patient was counseled regarding nutrition and physical activity  Development/psychosocial: unremarkable  Screen time: Limit to less than 2 hours per day. Monitor what child is viewing.  Follow-up yearly for a well visit, or sooner as needed.         you are feeling you need any assistance in this area, we have resources available to support you today.    Hunger Vital Signs:  Within the past 12 months we worried whether our food would run out before we got money to buy more. Never  Within the past 12 months the food we bought just didn't last and we didn't have money to get more. Never     Kimberlyn Escobar LPN,LPN on 1/4/2022 at 9:35 AM        Advance care plan reviewed      Kimberlyn Escobar LPN

## 2024-08-30 ENCOUNTER — TELEPHONE (OUTPATIENT)
Dept: INTERNAL MEDICINE | Facility: OTHER | Age: 69
End: 2024-08-30
Payer: MEDICARE

## 2024-08-30 DIAGNOSIS — R06.09 DOE (DYSPNEA ON EXERTION): Primary | ICD-10-CM

## 2024-08-30 DIAGNOSIS — I20.89 ANGINAL EQUIVALENT (H): ICD-10-CM

## 2024-08-30 NOTE — TELEPHONE ENCOUNTER
Message left for patient to return call to the Unit 3 RN. Nicki Pablo RN on 8/30/2024 at 10:53 AM

## 2024-08-30 NOTE — TELEPHONE ENCOUNTER
Patient continues to have issues with insurance getting a stress test covered. Please call and clarify if she is still having dyspnea or any new symptoms. I would recommend at this time that she come in for an EKG because it appears her insurance may approve the tests if this has changed. And also I will put in a CTA of the coronary arteries which is an imaging test and we'll see if this is covered.

## 2024-08-30 NOTE — TELEPHONE ENCOUNTER
Patient states her symptoms remain unchanged and agrees to Dr. Mitchell's orders. SOB and periodic heart palpitations, dizzy spells, hives. Patient was transferred to Scheduling. Nicki Pablo RN on 8/30/2024 at 12:26 PM

## 2024-09-05 ENCOUNTER — ALLIED HEALTH/NURSE VISIT (OUTPATIENT)
Dept: FAMILY MEDICINE | Facility: OTHER | Age: 69
End: 2024-09-05
Attending: INTERNAL MEDICINE
Payer: MEDICARE

## 2024-09-05 DIAGNOSIS — Z00.00 HEALTH CARE MAINTENANCE: Primary | ICD-10-CM

## 2024-09-05 LAB
ATRIAL RATE - MUSE: 65 BPM
DIASTOLIC BLOOD PRESSURE - MUSE: NORMAL MMHG
INTERPRETATION ECG - MUSE: NORMAL
P AXIS - MUSE: 38 DEGREES
PR INTERVAL - MUSE: 162 MS
QRS DURATION - MUSE: 86 MS
QT - MUSE: 426 MS
QTC - MUSE: 443 MS
R AXIS - MUSE: 48 DEGREES
SYSTOLIC BLOOD PRESSURE - MUSE: NORMAL MMHG
T AXIS - MUSE: 32 DEGREES
VENTRICULAR RATE- MUSE: 65 BPM

## 2024-09-05 PROCEDURE — 93010 ELECTROCARDIOGRAM REPORT: CPT | Performed by: INTERNAL MEDICINE

## 2024-09-05 PROCEDURE — 93005 ELECTROCARDIOGRAM TRACING: CPT

## 2024-09-05 NOTE — PROGRESS NOTES
Pt presented to nurse injection room to get an EKG, orders in Epic. EKG ordered by cardio due to 8/30/2024. EKG performed and transmitted, then imported to MUSE.    Cal Castañeda RN ....................  9/5/2024   9:52 AM

## 2024-09-10 ENCOUNTER — LAB (OUTPATIENT)
Dept: LAB | Facility: OTHER | Age: 69
End: 2024-09-10
Attending: INTERNAL MEDICINE
Payer: MEDICARE

## 2024-09-10 DIAGNOSIS — E78.5 HYPERLIPIDEMIA, UNSPECIFIED HYPERLIPIDEMIA TYPE: ICD-10-CM

## 2024-09-10 DIAGNOSIS — E03.9 HYPOTHYROIDISM, UNSPECIFIED TYPE: ICD-10-CM

## 2024-09-10 DIAGNOSIS — R74.8 ELEVATED LIVER ENZYMES: ICD-10-CM

## 2024-09-10 LAB
ALBUMIN SERPL BCG-MCNC: 4.7 G/DL (ref 3.5–5.2)
ALP SERPL-CCNC: 103 U/L (ref 40–150)
ALT SERPL W P-5'-P-CCNC: 40 U/L (ref 0–50)
AST SERPL W P-5'-P-CCNC: 30 U/L (ref 0–45)
BILIRUB DIRECT SERPL-MCNC: <0.2 MG/DL (ref 0–0.3)
BILIRUB SERPL-MCNC: 0.4 MG/DL
CHOLEST SERPL-MCNC: 177 MG/DL
FASTING STATUS PATIENT QL REPORTED: YES
HDLC SERPL-MCNC: 59 MG/DL
LDLC SERPL CALC-MCNC: 81 MG/DL
NONHDLC SERPL-MCNC: 118 MG/DL
PROT SERPL-MCNC: 7.6 G/DL (ref 6.4–8.3)
TRIGL SERPL-MCNC: 187 MG/DL
TSH SERPL DL<=0.005 MIU/L-ACNC: 0.67 UIU/ML (ref 0.3–4.2)

## 2024-09-10 PROCEDURE — 80076 HEPATIC FUNCTION PANEL: CPT | Mod: ZL

## 2024-09-10 PROCEDURE — 36415 COLL VENOUS BLD VENIPUNCTURE: CPT | Mod: ZL

## 2024-09-10 PROCEDURE — 84443 ASSAY THYROID STIM HORMONE: CPT | Mod: ZL

## 2024-09-10 PROCEDURE — 80061 LIPID PANEL: CPT | Mod: ZL

## 2024-09-11 ENCOUNTER — MYC REFILL (OUTPATIENT)
Dept: INTERNAL MEDICINE | Facility: OTHER | Age: 69
End: 2024-09-11
Payer: MEDICARE

## 2024-09-11 DIAGNOSIS — E03.9 HYPOTHYROIDISM, UNSPECIFIED TYPE: ICD-10-CM

## 2024-09-12 RX ORDER — LEVOTHYROXINE SODIUM 112 UG/1
TABLET ORAL
Qty: 48 TABLET | Refills: 0 | OUTPATIENT
Start: 2024-09-12

## 2024-09-12 NOTE — TELEPHONE ENCOUNTER
Refill addressed yesterday.  Unable to complete prescription refill per RN Medication Refill Policy. Shelley Cordon RN 9/12/2024 2:39 PM

## 2024-09-16 RX ORDER — LEVOTHYROXINE SODIUM 112 UG/1
112 TABLET ORAL
Qty: 48 TABLET | Refills: 0 | Status: SHIPPED | OUTPATIENT
Start: 2024-09-17 | End: 2024-09-20

## 2024-09-16 NOTE — TELEPHONE ENCOUNTER
Requested Prescriptions   Pending Prescriptions Disp Refills    levothyroxine (SYNTHROID/LEVOTHROID) 112 MCG tablet 48 tablet 0     Sig: Take 1 tablet (112 mcg) by mouth four times a week.   Last Prescription Date:   6/11/24  Last Fill Qty/Refills:         48, R-0      Also on active medication list:  levothyroxine (SYNTHROID/LEVOTHROID) 100 MCG tablet 90 tablet 0 7/15/2024 -- No   Sig - Route: Take 1 tablet by mouth once daily - Oral     Last Office Visit:                8/2/24 (VV)   6/10/24 (Px)    Future Office visit:         6/11/25     Per LOV note:  Hypothyroidism, unspecified type  TSH has continued to increase.  At this time we discussed altering her medication slightly so that she has a slight increase in her dosage.  New prescriptions are sent in.  We will plan to recheck this in approximately 8 weeks.  She is to call with any issues with the new dosage.  - TSH Reflex GH; Future  - levothyroxine (SYNTHROID/LEVOTHROID) 100 MCG tablet; Take 1 tablet (100 mcg) by mouth three times a week  - levothyroxine (SYNTHROID/LEVOTHROID) 112 MCG tablet; Take 1 tablet (112 mcg) by mouth four times a week    Unable to complete prescription refill per RN Medication Refill Policy. Umu Sanchez RN .............. 9/16/2024  1:38 PM

## 2024-09-16 NOTE — TELEPHONE ENCOUNTER
Please assist the patient in scheduling a follow-up appointment with her PCP.  Short course of medication refilled.

## 2024-09-17 NOTE — TELEPHONE ENCOUNTER
Patient has been scheduled with Nicki Lane for 9.20.2024.  Bernice Cates on 9/17/2024 at 12:28 PM

## 2024-09-20 ENCOUNTER — OFFICE VISIT (OUTPATIENT)
Dept: INTERNAL MEDICINE | Facility: OTHER | Age: 69
End: 2024-09-20
Payer: MEDICARE

## 2024-09-20 VITALS
BODY MASS INDEX: 26.3 KG/M2 | HEART RATE: 68 BPM | WEIGHT: 155.6 LBS | RESPIRATION RATE: 16 BRPM | OXYGEN SATURATION: 98 % | DIASTOLIC BLOOD PRESSURE: 64 MMHG | SYSTOLIC BLOOD PRESSURE: 122 MMHG

## 2024-09-20 DIAGNOSIS — E03.9 HYPOTHYROIDISM, UNSPECIFIED TYPE: ICD-10-CM

## 2024-09-20 DIAGNOSIS — E78.5 HYPERLIPIDEMIA, UNSPECIFIED HYPERLIPIDEMIA TYPE: Primary | ICD-10-CM

## 2024-09-20 PROCEDURE — 99213 OFFICE O/P EST LOW 20 MIN: CPT

## 2024-09-20 PROCEDURE — G2211 COMPLEX E/M VISIT ADD ON: HCPCS

## 2024-09-20 PROCEDURE — G0463 HOSPITAL OUTPT CLINIC VISIT: HCPCS

## 2024-09-20 RX ORDER — LEVOTHYROXINE SODIUM 112 UG/1
112 TABLET ORAL
Qty: 250 TABLET | Refills: 0 | Status: SHIPPED | OUTPATIENT
Start: 2024-09-21

## 2024-09-20 ASSESSMENT — PAIN SCALES - GENERAL: PAINLEVEL: NO PAIN (0)

## 2024-09-20 NOTE — PROGRESS NOTES
Subjective   Shirley is a 68 year old, presenting for the following health issues:  Recheck Medication      9/20/2024    10:26 AM   Additional Questions   Roomed by Aldo Oropeza LPN       ICD-10-CM    1. Hyperlipidemia, unspecified hyperlipidemia type  E78.5       2. Hypothyroidism, unspecified type  E03.9 levothyroxine (SYNTHROID/LEVOTHROID) 112 MCG tablet        Shirley is a very pleasant 68-year-old female who is following up with me in the clinic after seeing her primary care provider.  She has a history of hypothyroidism and hyperlipidemia.  She is currently on Synthroid and does not need dose adjustment after recent labs.  Her medication was refilled and she should continue to follow-up with her PCP regarding this.  She was also recently started on a statin medication and her lipid levels have shown great improvement.  She denies any medication side effects.  She can follow-up in the clinic as needed.    The longitudinal plan of care for the diagnosis(es)/condition(s) as documented were addressed during this visit. Due to the added complexity in care, I will continue to support Shirley in the subsequent management and with ongoing continuity of care.  History of Present Illness       Hypothyroidism:     Since last visit, patient describes the following symptoms::  None    She eats 2-3 servings of fruits and vegetables daily.She consumes 0 sweetened beverage(s) daily.She exercises with enough effort to increase her heart rate 30 to 60 minutes per day.  She exercises with enough effort to increase her heart rate 5 days per week.   She is taking medications regularly.     She is      Objective    /64   Pulse 68   Resp 16   Wt 70.6 kg (155 lb 9.6 oz)   LMP 01/01/1993 (Approximate)   SpO2 98%   Breastfeeding No   BMI 26.30 kg/m    Body mass index is 26.3 kg/m .  Physical Exam               Signed Electronically by: DAR Bee CNP

## 2024-09-20 NOTE — NURSING NOTE
"Chief Complaint   Patient presents with    Recheck Medication       Initial /64   Pulse 68   Resp 16   Wt 70.6 kg (155 lb 9.6 oz)   LMP 01/01/1993 (Approximate)   SpO2 98%   Breastfeeding No   BMI 26.30 kg/m   Estimated body mass index is 26.3 kg/m  as calculated from the following:    Height as of 6/10/24: 1.638 m (5' 4.5\").    Weight as of this encounter: 70.6 kg (155 lb 9.6 oz).  Medication Review: complete    The next two questions are to help us understand your food security.  If you are feeling you need any assistance in this area, we have resources available to support you today.          6/9/2024   SDOH- Food Insecurity   Within the past 12 months, did you worry that your food would run out before you got money to buy more? N   Within the past 12 months, did the food you bought just not last and you didn t have money to get more? N            Health Care Directive:  Patient has a Health Care Directive on file      Eliana Oropeza LPN      "

## 2024-10-10 DIAGNOSIS — E03.9 HYPOTHYROIDISM, UNSPECIFIED TYPE: ICD-10-CM

## 2024-10-10 RX ORDER — LEVOTHYROXINE SODIUM 100 UG/1
100 TABLET ORAL DAILY
Qty: 90 TABLET | Refills: 0 | Status: SHIPPED | OUTPATIENT
Start: 2024-10-10

## 2024-10-10 NOTE — TELEPHONE ENCOUNTER
[id number] [place] sent Rx request for the following:      Requested Prescriptions   Pending Prescriptions Disp Refills    levothyroxine (SYNTHROID/LEVOTHROID) 100 MCG tablet [Pharmacy Med Name: Levothyroxine Sodium 100 MCG Oral Tablet] 90 tablet 0     Sig: Take 1 tablet by mouth once daily   Last Prescription Date:   7/15/24  Last Fill Qty/Refills:         90, R-0      levothyroxine (SYNTHROID/LEVOTHROID) 112 MCG tablet 250 tablet 0 9/21/2024 -- No   Sig - Route: Take 1 tablet (112 mcg) by mouth four times a week. - Oral     Last Office Visit:              9/20/24 (hypothyroidism discussed)   Future Office visit:           6/11/25    Per LOV note:  She is currently on Synthroid and does not need dose adjustment after recent labs. Her medication was refilled and she should continue to follow-up with her PCP regarding this.     Is the 100 mcg discontinued?    Please advise.    Unable to complete prescription refill per RN Medication Refill Policy. Umu Sanchez RN .............. 10/10/2024  3:04 PM

## 2024-10-15 ENCOUNTER — TRANSFERRED RECORDS (OUTPATIENT)
Dept: HEALTH INFORMATION MANAGEMENT | Facility: OTHER | Age: 69
End: 2024-10-15

## 2024-10-22 ENCOUNTER — TELEPHONE (OUTPATIENT)
Dept: CARDIOLOGY | Facility: OTHER | Age: 69
End: 2024-10-22
Payer: MEDICARE

## 2024-10-22 NOTE — TELEPHONE ENCOUNTER
CANDE with our direct number given to return our call for appointment on 10/25/24 for any questions.

## 2024-10-25 ENCOUNTER — HOSPITAL ENCOUNTER (OUTPATIENT)
Dept: CT IMAGING | Facility: OTHER | Age: 69
Discharge: HOME | End: 2024-10-25
Attending: INTERNAL MEDICINE | Admitting: INTERNAL MEDICINE
Payer: MEDICARE

## 2024-10-25 VITALS
HEIGHT: 64 IN | OXYGEN SATURATION: 99 % | BODY MASS INDEX: 26.46 KG/M2 | RESPIRATION RATE: 20 BRPM | SYSTOLIC BLOOD PRESSURE: 168 MMHG | DIASTOLIC BLOOD PRESSURE: 88 MMHG | HEART RATE: 60 BPM | WEIGHT: 155 LBS

## 2024-10-25 DIAGNOSIS — R06.09 DOE (DYSPNEA ON EXERTION): ICD-10-CM

## 2024-10-25 DIAGNOSIS — I20.89 ANGINAL EQUIVALENT (H): ICD-10-CM

## 2024-10-25 PROCEDURE — 250N000013 HC RX MED GY IP 250 OP 250 PS 637: Performed by: INTERNAL MEDICINE

## 2024-10-25 PROCEDURE — 250N000011 HC RX IP 250 OP 636: Performed by: INTERNAL MEDICINE

## 2024-10-25 PROCEDURE — 75574 CT ANGIO HRT W/3D IMAGE: CPT | Mod: ME

## 2024-10-25 PROCEDURE — 250N000009 HC RX 250: Performed by: INTERNAL MEDICINE

## 2024-10-25 RX ORDER — METOPROLOL TARTRATE 1 MG/ML
5-15 INJECTION, SOLUTION INTRAVENOUS
Status: DISCONTINUED | OUTPATIENT
Start: 2024-10-25 | End: 2024-10-26 | Stop reason: HOSPADM

## 2024-10-25 RX ORDER — DIPHENHYDRAMINE HYDROCHLORIDE 50 MG/ML
25-50 INJECTION INTRAMUSCULAR; INTRAVENOUS
Status: DISCONTINUED | OUTPATIENT
Start: 2024-10-25 | End: 2024-10-26 | Stop reason: HOSPADM

## 2024-10-25 RX ORDER — ONDANSETRON 2 MG/ML
4 INJECTION INTRAMUSCULAR; INTRAVENOUS
Status: DISCONTINUED | OUTPATIENT
Start: 2024-10-25 | End: 2024-10-26 | Stop reason: HOSPADM

## 2024-10-25 RX ORDER — NITROGLYCERIN 0.4 MG/1
0.4 TABLET SUBLINGUAL
Status: DISCONTINUED | OUTPATIENT
Start: 2024-10-25 | End: 2024-10-26 | Stop reason: HOSPADM

## 2024-10-25 RX ORDER — METHYLPREDNISOLONE SODIUM SUCCINATE 125 MG/2ML
125 INJECTION INTRAMUSCULAR; INTRAVENOUS
Status: DISCONTINUED | OUTPATIENT
Start: 2024-10-25 | End: 2024-10-26 | Stop reason: HOSPADM

## 2024-10-25 RX ORDER — LIDOCAINE 40 MG/G
CREAM TOPICAL
Status: DISCONTINUED | OUTPATIENT
Start: 2024-10-25 | End: 2024-10-26 | Stop reason: HOSPADM

## 2024-10-25 RX ORDER — METOPROLOL TARTRATE 25 MG/1
25-100 TABLET, FILM COATED ORAL
Status: COMPLETED | OUTPATIENT
Start: 2024-10-25 | End: 2024-10-25

## 2024-10-25 RX ORDER — IOPAMIDOL 755 MG/ML
89 INJECTION, SOLUTION INTRAVASCULAR ONCE
Status: COMPLETED | OUTPATIENT
Start: 2024-10-25 | End: 2024-10-25

## 2024-10-25 RX ORDER — DIPHENHYDRAMINE HCL 25 MG
25 CAPSULE ORAL
Status: DISCONTINUED | OUTPATIENT
Start: 2024-10-25 | End: 2024-10-26 | Stop reason: HOSPADM

## 2024-10-25 RX ADMIN — IOPAMIDOL 89 ML: 755 INJECTION, SOLUTION INTRAVENOUS at 13:43

## 2024-10-25 RX ADMIN — NITROGLYCERIN 0.4 MG: 0.4 TABLET SUBLINGUAL at 13:41

## 2024-10-25 RX ADMIN — METOPROLOL TARTRATE 100 MG: 100 TABLET, FILM COATED ORAL at 12:02

## 2024-10-25 RX ADMIN — SODIUM CHLORIDE 100 ML: 9 INJECTION, SOLUTION INTRAVENOUS at 13:43

## 2024-10-25 NOTE — PROGRESS NOTES
1145  Patient arrived for a CCTA. . They were connected to a cardiac monitor and vital signs were obtained. The patient's heart rate was 65 . Medications and allergies were reviewed. Metoprolol 100 mg was administered per protocol. The procedure was explained, and the patient was instructed to rest and relax, as much as possible. A saline lock was established. The patient's heart rate 53. The patient was offered the restroom, and then they were wheeled to CT in a wheelchair.  The cardiac monitor was placed there, and the test was completed. The patient was given one bottle of water, and they were told to  Drink at least 3 other additional glasses of water today, per post contrast instructions.  The patient was instructed that the ordering MD will call with results in one to two days with test results. The patient left ambulatory in stable condition.

## 2024-11-05 ENCOUNTER — MYC MEDICAL ADVICE (OUTPATIENT)
Dept: INTERNAL MEDICINE | Facility: OTHER | Age: 69
End: 2024-11-05
Payer: MEDICARE

## 2024-11-06 NOTE — TELEPHONE ENCOUNTER
Called [facility] to schedule work-in with Domingo tomorrow for SI joint pain.     [facility]       Arina Fuentes RN on 11/6/2024 at 11:14 AM

## 2024-11-07 ENCOUNTER — TELEPHONE (OUTPATIENT)
Dept: INTERNAL MEDICINE | Facility: OTHER | Age: 69
End: 2024-11-07

## 2024-11-07 ENCOUNTER — OFFICE VISIT (OUTPATIENT)
Dept: INTERNAL MEDICINE | Facility: OTHER | Age: 69
End: 2024-11-07
Payer: MEDICARE

## 2024-11-07 VITALS
TEMPERATURE: 98.1 F | OXYGEN SATURATION: 99 % | BODY MASS INDEX: 26.26 KG/M2 | WEIGHT: 157.6 LBS | DIASTOLIC BLOOD PRESSURE: 70 MMHG | HEART RATE: 74 BPM | HEIGHT: 65 IN | SYSTOLIC BLOOD PRESSURE: 100 MMHG | RESPIRATION RATE: 16 BRPM

## 2024-11-07 DIAGNOSIS — M54.41 ACUTE RIGHT-SIDED LOW BACK PAIN WITH RIGHT-SIDED SCIATICA: Primary | ICD-10-CM

## 2024-11-07 DIAGNOSIS — M53.3 SI (SACROILIAC) JOINT DYSFUNCTION: ICD-10-CM

## 2024-11-07 DIAGNOSIS — M43.28 FUSION OF SPINE, SACRAL AND SACROCOCCYGEAL REGION: ICD-10-CM

## 2024-11-07 PROCEDURE — 99214 OFFICE O/P EST MOD 30 MIN: CPT

## 2024-11-07 PROCEDURE — G0463 HOSPITAL OUTPT CLINIC VISIT: HCPCS

## 2024-11-07 RX ORDER — CYCLOBENZAPRINE HCL 5 MG
5 TABLET ORAL 2 TIMES DAILY PRN
Qty: 60 TABLET | Refills: 1 | Status: SHIPPED | OUTPATIENT
Start: 2024-11-07

## 2024-11-07 RX ORDER — METHYLPREDNISOLONE 4 MG/1
TABLET ORAL
Qty: 21 TABLET | Refills: 0 | Status: SHIPPED | OUTPATIENT
Start: 2024-11-07

## 2024-11-07 ASSESSMENT — PAIN SCALES - GENERAL: PAINLEVEL_OUTOF10: SEVERE PAIN (7)

## 2024-11-07 NOTE — TELEPHONE ENCOUNTER
Please notify Dr. Maradiaga via phone message that Shirley has had a return of symptoms of right sciatic pain.  Of note he did a right SI joint fusion in 2021.  Had been pain-free until this summer.  She is requesting an SI joint injection here at [facility] for convenience.  X-rays will be obtained tomorrow.

## 2024-11-07 NOTE — NURSING NOTE
Pt here for right SI joint paint since summer.  Has gotten worse since the cooler weather set in. She had it fused about 4 years ago.  Maggie Bowman CMA ([facility])......................11/7/2024  11:10 AM       Medication Reconciliation: complete    Maggie Bowman CMA  11/7/2024 11:10 AM      FOOD SECURITY SCREENING QUESTIONS:    The next two questions are to help us understand your food security.  If you are feeling you need any assistance in this area, we have resources available to support you today.    Hunger Vital Signs:  Within the past 12 months we worried whether our food would run out before we got money to buy more. Never  Within the past 12 months the food we bought just didn't last and we didn't have money to get more. Never  Maggie Bowman CMA,LPN on 11/7/2024 at 11:10 AM

## 2024-11-07 NOTE — PROGRESS NOTES
Subjective   Shirley is a 68 year old, presenting for the following health issues:  Musculoskeletal Problem (Right sciatic pain)      11/7/2024    11:11 AM   Additional Questions   Roomed by Maggie SORENSEN CMA       ICD-10-CM    1. Acute right-sided low back pain with right-sided sciatica  M54.41 cyclobenzaprine (FLEXERIL) 5 MG tablet     methylPREDNISolone (MEDROL DOSEPAK) 4 MG tablet therapy pack     XR Lumbar Spine 2/3 Views     XR Sacroiliac Diagnostic Joint Injection Bl      2. SI (sacroiliac) joint dysfunction  M53.3       3. Fusion of spine, sacral and sacrococcygeal region  M43.28 XR Sacroiliac Diagnostic Joint Injection Bl      Shirley is a very pleasant 68 year old female patient with a past history of SI joint dysfunction. She failed conservative therapies and had a right SI joint fusion in 2021 with Dr. Maradiaga in [place]. She has done very well since her initial post operative period. This past summer she began having the same symptoms that she had prior to her surgery with progression. Pain is described as constant pain in her right buttocks that extents into her lateral aspect of her right leg to just below the knee. She also endorses a burning type pain in the posterior portion of her knee. No gross, obvious abnormality. She has been doing daily yoga, massage, heat, topical creams, oral NSAIDs, and muscle relaxants with minimal relief. She reports that Dr. Maradiaga told her that if she had return of symptoms that she could try an injection. She would prefer to have this done with Dr. Zamarripa if possible. Will repeat xrays today to rule out hardware malfunction. Prescription sent for medrol dose pack to decrease inflammation as well as flexeril. I will reach out to Dr. Maradiaga as well to notify of her return of symptoms.     The longitudinal plan of care for the diagnosis(es)/condition(s) as documented were addressed during this visit. Due to the added complexity in care, I will continue to support Shirley in the  "subsequent management and with ongoing continuity of care.    Musculoskeletal Problem    History of Present Illness       Reason for visit:  Right sciatica pain    She eats 2-3 servings of fruits and vegetables daily.She consumes 0 sweetened beverage(s) daily.She exercises with enough effort to increase her heart rate 20 to 29 minutes per day.  She exercises with enough effort to increase her heart rate 5 days per week.   She is taking medications regularly.     Pain started gradually progressing this summer. Noted when she had a heavy work day  Now steady. Mornings are difficult  Sleep is difficult. Not able to lay on right side.   Yoga daily  Hot tub  Creams ([place])    Surgery with Dr. Maradiaga 2021    Leaving for [place] on 12/5 - driving, concerned about pain with sitting in car for extended period.       Review of Systems  CONSTITUTIONAL: NEGATIVE for fever, chills, change in weight  ENT/MOUTH: NEGATIVE for ear, mouth and throat problems  RESP: NEGATIVE for significant cough or SOB  CV: NEGATIVE for chest pain, palpitations or peripheral edema      Objective    /70 (BP Location: Right arm, Patient Position: Sitting, Cuff Size: Adult Regular)   Pulse 74   Temp 98.1  F (36.7  C) (Tympanic)   Resp 16   Ht 1.638 m (5' 4.5\")   Wt 71.5 kg (157 lb 9.6 oz)   LMP 01/01/1993 (Approximate)   SpO2 99%   BMI 26.63 kg/m    Body mass index is 26.63 kg/m .  Physical Exam   GENERAL: alert and no distress  NECK: no adenopathy, no asymmetry, masses, or scars  RESP: lungs clear to auscultation - no rales, rhonchi or wheezes  CV: regular rate and rhythm, normal S1 S2, no S3 or S4, no murmur, click or rub, no peripheral edema  ABDOMEN: soft, nontender, no hepatosplenomegaly, no masses and bowel sounds normal  MS: no gross musculoskeletal defects noted, no edema      Signed Electronically by: DAR Bee CNP    "

## 2024-11-08 ENCOUNTER — HOSPITAL ENCOUNTER (OUTPATIENT)
Dept: GENERAL RADIOLOGY | Facility: OTHER | Age: 69
Discharge: HOME | End: 2024-11-08
Payer: MEDICARE

## 2024-11-08 DIAGNOSIS — M54.41 ACUTE RIGHT-SIDED LOW BACK PAIN WITH RIGHT-SIDED SCIATICA: ICD-10-CM

## 2024-11-08 PROCEDURE — 72100 X-RAY EXAM L-S SPINE 2/3 VWS: CPT

## 2024-11-18 ENCOUNTER — TRANSFERRED RECORDS (OUTPATIENT)
Dept: HEALTH INFORMATION MANAGEMENT | Facility: OTHER | Age: 69
End: 2024-11-18
Payer: MEDICARE

## 2024-11-25 ENCOUNTER — HOSPITAL ENCOUNTER (OUTPATIENT)
Dept: GENERAL RADIOLOGY | Facility: OTHER | Age: 69
Discharge: HOME | End: 2024-11-25
Payer: MEDICARE

## 2024-11-25 DIAGNOSIS — M43.28 FUSION OF SPINE, SACRAL AND SACROCOCCYGEAL REGION: ICD-10-CM

## 2024-11-25 DIAGNOSIS — M54.41 ACUTE RIGHT-SIDED LOW BACK PAIN WITH RIGHT-SIDED SCIATICA: ICD-10-CM

## 2024-11-25 PROCEDURE — 27096 INJECT SACROILIAC JOINT: CPT | Mod: RT

## 2024-11-25 PROCEDURE — 255N000002 HC RX 255 OP 636: Performed by: RADIOLOGY

## 2024-11-25 PROCEDURE — 250N000011 HC RX IP 250 OP 636: Performed by: RADIOLOGY

## 2024-11-25 PROCEDURE — 250N000009 HC RX 250: Performed by: RADIOLOGY

## 2024-11-25 RX ORDER — TRIAMCINOLONE ACETONIDE 40 MG/ML
40 INJECTION, SUSPENSION INTRA-ARTICULAR; INTRAMUSCULAR ONCE
Status: COMPLETED | OUTPATIENT
Start: 2024-11-25 | End: 2024-11-25

## 2024-11-25 RX ORDER — LIDOCAINE HYDROCHLORIDE 10 MG/ML
2 INJECTION, SOLUTION INFILTRATION; PERINEURAL ONCE
Status: COMPLETED | OUTPATIENT
Start: 2024-11-25 | End: 2024-11-25

## 2024-11-25 RX ORDER — BUPIVACAINE HYDROCHLORIDE 5 MG/ML
3 INJECTION, SOLUTION EPIDURAL; INTRACAUDAL ONCE
Status: COMPLETED | OUTPATIENT
Start: 2024-11-25 | End: 2024-11-25

## 2024-11-25 RX ADMIN — BUPIVACAINE HYDROCHLORIDE 15 MG: 5 INJECTION, SOLUTION EPIDURAL; INTRACAUDAL; PERINEURAL at 12:20

## 2024-11-25 RX ADMIN — TRIAMCINOLONE ACETONIDE 40 MG: 40 INJECTION, SUSPENSION INTRA-ARTICULAR; INTRAMUSCULAR at 12:20

## 2024-11-25 RX ADMIN — LIDOCAINE HYDROCHLORIDE 1 ML: 10 INJECTION, SOLUTION INFILTRATION; PERINEURAL at 12:20

## 2024-11-25 RX ADMIN — IOHEXOL 1 ML: 240 INJECTION, SOLUTION INTRATHECAL; INTRAVASCULAR; INTRAVENOUS; ORAL at 12:20

## 2024-11-27 ENCOUNTER — MYC REFILL (OUTPATIENT)
Dept: INTERNAL MEDICINE | Facility: OTHER | Age: 69
End: 2024-11-27
Payer: MEDICARE

## 2024-11-27 DIAGNOSIS — E03.9 HYPOTHYROIDISM, UNSPECIFIED TYPE: ICD-10-CM

## 2024-11-27 RX ORDER — LEVOTHYROXINE SODIUM 112 UG/1
112 TABLET ORAL
Qty: 250 TABLET | Refills: 0 | Status: CANCELLED | OUTPATIENT
Start: 2024-11-28

## 2024-11-27 NOTE — TELEPHONE ENCOUNTER
levothyroxine (SYNTHROID/LEVOTHROID) 112 MCG tablet 250 tablet 0 9/21/2024 -- No   Sig - Route: Take 1 tablet (112 mcg) by mouth four times a week. - Oral   Sent to pharmacy as: Levothyroxine Sodium 112 MCG Oral Tablet (SYNTHROID/LEVOTHROID)   Class: E-Prescribe   Order: [id number]   E-Prescribing Status: Receipt confirmed by pharmacy (9/20/2024 10:47 AM CDT)     [facility] - [address] informed of prescription via SHADOWt. Umu Sanchez RN .............. 11/27/2024  4:20 PM

## 2024-12-02 ENCOUNTER — TRANSFERRED RECORDS (OUTPATIENT)
Dept: HEALTH INFORMATION MANAGEMENT | Facility: OTHER | Age: 69
End: 2024-12-02
Payer: MEDICARE

## 2025-06-06 PROBLEM — R42 DIZZINESS: Status: ACTIVE | Noted: 2023-11-01

## 2025-06-06 PROBLEM — R63.5 WEIGHT GAIN: Status: ACTIVE | Noted: 2023-12-01

## 2025-06-06 PROBLEM — R32 INCONTINENCE: Status: ACTIVE | Noted: 2023-12-01

## 2025-06-06 PROBLEM — I95.9 HYPOTENSION: Status: ACTIVE | Noted: 2023-11-01

## 2025-06-09 SDOH — HEALTH STABILITY: PHYSICAL HEALTH: ON AVERAGE, HOW MANY MINUTES DO YOU ENGAGE IN EXERCISE AT THIS LEVEL?: 30 MIN

## 2025-06-09 SDOH — HEALTH STABILITY: PHYSICAL HEALTH: ON AVERAGE, HOW MANY DAYS PER WEEK DO YOU ENGAGE IN MODERATE TO STRENUOUS EXERCISE (LIKE A BRISK WALK)?: 5 DAYS

## 2025-06-09 ASSESSMENT — SOCIAL DETERMINANTS OF HEALTH (SDOH): HOW OFTEN DO YOU GET TOGETHER WITH FRIENDS OR RELATIVES?: TWICE A WEEK

## 2025-06-11 ENCOUNTER — RESULTS FOLLOW-UP (OUTPATIENT)
Dept: INTERNAL MEDICINE | Facility: OTHER | Age: 70
End: 2025-06-11

## 2025-06-11 ENCOUNTER — HOSPITAL ENCOUNTER (OUTPATIENT)
Dept: MAMMOGRAPHY | Facility: OTHER | Age: 70
Discharge: HOME | End: 2025-06-11
Attending: INTERNAL MEDICINE
Payer: MEDICARE

## 2025-06-11 ENCOUNTER — OFFICE VISIT (OUTPATIENT)
Dept: INTERNAL MEDICINE | Facility: OTHER | Age: 70
End: 2025-06-11
Attending: INTERNAL MEDICINE
Payer: MEDICARE

## 2025-06-11 VITALS
SYSTOLIC BLOOD PRESSURE: 120 MMHG | HEIGHT: 65 IN | WEIGHT: 153.4 LBS | TEMPERATURE: 97.3 F | HEART RATE: 68 BPM | BODY MASS INDEX: 25.56 KG/M2 | RESPIRATION RATE: 14 BRPM | DIASTOLIC BLOOD PRESSURE: 70 MMHG | OXYGEN SATURATION: 99 %

## 2025-06-11 DIAGNOSIS — N39.41 URGENCY INCONTINENCE: ICD-10-CM

## 2025-06-11 DIAGNOSIS — E03.9 HYPOTHYROIDISM, UNSPECIFIED TYPE: ICD-10-CM

## 2025-06-11 DIAGNOSIS — Z71.85 VACCINE COUNSELING: ICD-10-CM

## 2025-06-11 DIAGNOSIS — R06.09 DOE (DYSPNEA ON EXERTION): ICD-10-CM

## 2025-06-11 DIAGNOSIS — Z78.0 ASYMPTOMATIC MENOPAUSAL STATE: ICD-10-CM

## 2025-06-11 DIAGNOSIS — Z13.1 SCREENING FOR DIABETES MELLITUS: ICD-10-CM

## 2025-06-11 DIAGNOSIS — J30.9 CHRONIC ALLERGIC RHINITIS: ICD-10-CM

## 2025-06-11 DIAGNOSIS — Z00.00 ENCOUNTER FOR MEDICARE ANNUAL WELLNESS EXAM: Primary | ICD-10-CM

## 2025-06-11 DIAGNOSIS — Z12.31 VISIT FOR SCREENING MAMMOGRAM: ICD-10-CM

## 2025-06-11 DIAGNOSIS — K21.9 GASTROESOPHAGEAL REFLUX DISEASE WITHOUT ESOPHAGITIS: ICD-10-CM

## 2025-06-11 DIAGNOSIS — E78.5 HYPERLIPIDEMIA, UNSPECIFIED HYPERLIPIDEMIA TYPE: ICD-10-CM

## 2025-06-11 DIAGNOSIS — I10 PRIMARY HYPERTENSION: ICD-10-CM

## 2025-06-11 PROBLEM — Z09 ENCOUNTER FOR FOLLOW-UP EXAMINATION: Status: RESOLVED | Noted: 2022-01-04 | Resolved: 2025-06-11

## 2025-06-11 PROBLEM — R63.5 WEIGHT GAIN: Status: RESOLVED | Noted: 2023-12-01 | Resolved: 2025-06-11

## 2025-06-11 PROBLEM — R29.898 WEAKNESS OF RIGHT LEG: Chronic | Status: RESOLVED | Noted: 2020-10-16 | Resolved: 2025-06-11

## 2025-06-11 PROBLEM — R42 DIZZINESS: Status: RESOLVED | Noted: 2023-11-01 | Resolved: 2025-06-11

## 2025-06-11 LAB
ALBUMIN SERPL BCG-MCNC: 4.7 G/DL (ref 3.5–5.2)
ALBUMIN UR-MCNC: NEGATIVE MG/DL
ALP SERPL-CCNC: 99 U/L (ref 40–150)
ALT SERPL W P-5'-P-CCNC: 37 U/L (ref 0–50)
ANION GAP SERPL CALCULATED.3IONS-SCNC: 10 MMOL/L (ref 7–15)
APPEARANCE UR: CLEAR
AST SERPL W P-5'-P-CCNC: 31 U/L (ref 0–45)
BILIRUB SERPL-MCNC: 0.4 MG/DL
BILIRUB UR QL STRIP: NEGATIVE
BUN SERPL-MCNC: 14.1 MG/DL (ref 8–23)
CALCIUM SERPL-MCNC: 9.9 MG/DL (ref 8.8–10.4)
CHLORIDE SERPL-SCNC: 103 MMOL/L (ref 98–107)
CHOLEST SERPL-MCNC: 194 MG/DL
COLOR UR AUTO: ABNORMAL
CREAT SERPL-MCNC: 0.64 MG/DL (ref 0.51–0.95)
EGFRCR SERPLBLD CKD-EPI 2021: >90 ML/MIN/1.73M2
ERYTHROCYTE [DISTWIDTH] IN BLOOD BY AUTOMATED COUNT: 12.9 % (ref 10–15)
FASTING STATUS PATIENT QL REPORTED: YES
FASTING STATUS PATIENT QL REPORTED: YES
GLUCOSE SERPL-MCNC: 103 MG/DL (ref 70–99)
GLUCOSE UR STRIP-MCNC: NEGATIVE MG/DL
HCO3 SERPL-SCNC: 28 MMOL/L (ref 22–29)
HCT VFR BLD AUTO: 43.2 % (ref 35–47)
HDLC SERPL-MCNC: 60 MG/DL
HGB BLD-MCNC: 14.6 G/DL (ref 11.7–15.7)
HGB UR QL STRIP: NEGATIVE
KETONES UR STRIP-MCNC: NEGATIVE MG/DL
LDLC SERPL CALC-MCNC: 98 MG/DL
LEUKOCYTE ESTERASE UR QL STRIP: NEGATIVE
MAGNESIUM SERPL-MCNC: 2.1 MG/DL (ref 1.7–2.3)
MCH RBC QN AUTO: 31 PG (ref 26.5–33)
MCHC RBC AUTO-ENTMCNC: 33.8 G/DL (ref 31.5–36.5)
MCV RBC AUTO: 92 FL (ref 78–100)
MUCOUS THREADS #/AREA URNS LPF: PRESENT /LPF
NITRATE UR QL: NEGATIVE
NONHDLC SERPL-MCNC: 134 MG/DL
PH UR STRIP: 6.5 [PH] (ref 5–9)
PLATELET # BLD AUTO: 236 10E3/UL (ref 150–450)
POTASSIUM SERPL-SCNC: 4.2 MMOL/L (ref 3.4–5.3)
PROT SERPL-MCNC: 7.4 G/DL (ref 6.4–8.3)
RBC # BLD AUTO: 4.71 10E6/UL (ref 3.8–5.2)
RBC URINE: <1 /HPF
SODIUM SERPL-SCNC: 141 MMOL/L (ref 135–145)
SP GR UR STRIP: 1.02 (ref 1–1.03)
TRIGL SERPL-MCNC: 178 MG/DL
TSH SERPL DL<=0.005 MIU/L-ACNC: 0.48 UIU/ML (ref 0.3–4.2)
UROBILINOGEN UR STRIP-MCNC: NORMAL MG/DL
VIT B12 SERPL-MCNC: 406 PG/ML (ref 232–1245)
WBC # BLD AUTO: 5.7 10E3/UL (ref 4–11)
WBC URINE: 1 /HPF

## 2025-06-11 PROCEDURE — 85027 COMPLETE CBC AUTOMATED: CPT | Mod: ZL | Performed by: INTERNAL MEDICINE

## 2025-06-11 PROCEDURE — 99214 OFFICE O/P EST MOD 30 MIN: CPT | Mod: 25 | Performed by: INTERNAL MEDICINE

## 2025-06-11 PROCEDURE — 1126F AMNT PAIN NOTED NONE PRSNT: CPT | Performed by: INTERNAL MEDICINE

## 2025-06-11 PROCEDURE — G0439 PPPS, SUBSEQ VISIT: HCPCS | Performed by: INTERNAL MEDICINE

## 2025-06-11 PROCEDURE — 82040 ASSAY OF SERUM ALBUMIN: CPT | Mod: ZL | Performed by: INTERNAL MEDICINE

## 2025-06-11 PROCEDURE — 3074F SYST BP LT 130 MM HG: CPT | Performed by: INTERNAL MEDICINE

## 2025-06-11 PROCEDURE — G0463 HOSPITAL OUTPT CLINIC VISIT: HCPCS | Mod: 25 | Performed by: INTERNAL MEDICINE

## 2025-06-11 PROCEDURE — 3048F LDL-C <100 MG/DL: CPT | Performed by: INTERNAL MEDICINE

## 2025-06-11 PROCEDURE — 83735 ASSAY OF MAGNESIUM: CPT | Mod: ZL | Performed by: INTERNAL MEDICINE

## 2025-06-11 PROCEDURE — 80061 LIPID PANEL: CPT | Mod: ZL | Performed by: INTERNAL MEDICINE

## 2025-06-11 PROCEDURE — 36415 COLL VENOUS BLD VENIPUNCTURE: CPT | Mod: ZL | Performed by: INTERNAL MEDICINE

## 2025-06-11 PROCEDURE — G2211 COMPLEX E/M VISIT ADD ON: HCPCS | Performed by: INTERNAL MEDICINE

## 2025-06-11 PROCEDURE — 81003 URINALYSIS AUTO W/O SCOPE: CPT | Mod: ZL | Performed by: INTERNAL MEDICINE

## 2025-06-11 PROCEDURE — 82607 VITAMIN B-12: CPT | Mod: ZL | Performed by: INTERNAL MEDICINE

## 2025-06-11 PROCEDURE — 3078F DIAST BP <80 MM HG: CPT | Performed by: INTERNAL MEDICINE

## 2025-06-11 PROCEDURE — 77063 BREAST TOMOSYNTHESIS BI: CPT

## 2025-06-11 PROCEDURE — 84443 ASSAY THYROID STIM HORMONE: CPT | Mod: ZL | Performed by: INTERNAL MEDICINE

## 2025-06-11 RX ORDER — ESOMEPRAZOLE MAGNESIUM 40 MG/1
40 CAPSULE, DELAYED RELEASE ORAL
Qty: 90 CAPSULE | Refills: 4 | Status: SHIPPED | OUTPATIENT
Start: 2025-06-11

## 2025-06-11 RX ORDER — ROSUVASTATIN CALCIUM 5 MG/1
5 TABLET, COATED ORAL DAILY
Qty: 90 TABLET | Refills: 4 | Status: SHIPPED | OUTPATIENT
Start: 2025-06-11

## 2025-06-11 RX ORDER — MIRABEGRON 25 MG/1
25 TABLET, FILM COATED, EXTENDED RELEASE ORAL DAILY
Qty: 90 TABLET | Refills: 4 | Status: SHIPPED | OUTPATIENT
Start: 2025-06-11

## 2025-06-11 RX ORDER — IBUPROFEN 200 MG
1 CAPSULE ORAL DAILY
COMMUNITY
Start: 2025-06-11

## 2025-06-11 RX ORDER — CETIRIZINE HCL 1 MG/ML
10 SOLUTION, ORAL ORAL DAILY
Qty: 90 TABLET | Refills: 4 | Status: SHIPPED | OUTPATIENT
Start: 2025-06-11

## 2025-06-11 ASSESSMENT — PAIN SCALES - GENERAL: PAINLEVEL_OUTOF10: NO PAIN (0)

## 2025-06-11 NOTE — PROGRESS NOTES
"Preventive Care Visit  [facility] AND [facility]  Sonia Mitchell DO, Internal Medicine  Jun 11, 2025  {Provider  Link to SmartSet :948421}    Encounter for Medicare annual wellness exam    -Last mammo done 12/29/23, due 12/29/24 (impression: normal). Scheduled for today.    -Last DEXA done 7/19/24 (impression: Osteopenia).     -Last colon cancer screening done 11/7/23 via cologuard (impression: negative). Follow-up 3 years.     -Immunizations: RSV in the fall.    Annual labs ordered.   Medications renewed.     BMI  Estimated body mass index is 25.92 kg/m  as calculated from the following:    Height as of this encounter: 1.638 m (5' 4.5\").    Weight as of this encounter: 69.6 kg (153 lb 6.4 oz).   Weight management plan: Discussed healthy diet and exercise guidelines    Counseling  Appropriate preventive services were addressed with this patient via screening, questionnaire, or discussion as appropriate for fall prevention, nutrition, physical activity, Tobacco-use cessation, social engagement, weight loss and cognition.  Checklist reviewing preventive services available has been given to the patient.  Reviewed patient's diet, addressing concerns and/or questions.   She is at risk for psychosocial distress and has been provided with information to reduce risk.   Information on urinary incontinence and treatment options given to patient.     Work on weight loss  Regular exercise    No follow-ups on file.    Herrera Watson is a 69 year old, presenting for the following:  Medicare Visit        6/11/2025     9:09 AM   Additional Questions   Roomed by CANDY Mccauley     {ROOMER if patient is in their first year of Medicare a vision screen is required click here to document the Vison screen and then refresh the note to pull in results  :[id number]}    Health Care Directive  {The storyboard will display whether the patient has ACP docs on file. Hover over the Code section in the storyboard to access the ACP documents. " :744657}  Document on file is a Health Care Directive or POLST.        6/9/2025   General Health   How would you rate your overall physical health? Good   Feel stress (tense, anxious, or unable to sleep) To some extent   (!) STRESS CONCERN      6/9/2025   Nutrition   Diet: Regular (no restrictions)         6/9/2025   Exercise   Days per week of moderate/strenous exercise 5 days   Average minutes spent exercising at this level 30 min         6/9/2025   Social Factors   Frequency of gathering with friends or relatives Twice a week   Worry food won't last until get money to buy more No   Food not last or not have enough money for food? No   Do you have housing? (Housing is defined as stable permanent housing and does not include staying outside in a car, in a tent, in an abandoned building, in an overnight shelter, or couch-surfing.) Yes   Are you worried about losing your housing? No   Lack of transportation? No   Unable to get utilities (heat,electricity)? No         6/9/2025   Fall Risk   Fallen 2 or more times in the past year? No   Trouble with walking or balance? No          6/9/2025   Activities of Daily Living- Home Safety   Needs help with the following daily activites None of the above   Safety concerns in the home None of the above         6/9/2025   Dental   Dentist two times every year? Yes         6/9/2025   Hearing Screening   Hearing concerns? None of the above         6/9/2025   Driving Risk Screening   Patient/family members have concerns about driving No         6/9/2025   General Alertness/Fatigue Screening   Have you been more tired than usual lately? No         6/9/2025   Urinary Incontinence Screening   Bothered by leaking urine in past 6 months Yes     Today's PHQ-2 Score:       6/11/2025     8:44 AM   PHQ-2 ( 1999 [employer])   Q1: Little interest or pleasure in doing things 0   Q2: Feeling down, depressed or hopeless 0   PHQ-2 Score 0    Q1: Little interest or pleasure in doing things Not at all    Q2: Feeling down, depressed or hopeless Not at all   PHQ-2 Score 0       Patient-reported         2025   Substance Use   Alcohol more than 3/day or more than 7/wk No   Do you have a current opioid prescription? No   How severe/bad is pain from 1 to 10? 1/10   Do you use any other substances recreationally? No     Social History     Tobacco Use    Smoking status: Former     Current packs/day: 0.00     Average packs/day: 0.2 packs/day for 20.0 years (3.0 ttl pk-yrs)     Types: Cigarettes     Start date: 1985     Quit date: 2005     Years since quittin.4    Smokeless tobacco: Never   Vaping Use    Vaping status: Never Used   Substance Use Topics    Alcohol use: Yes     Comment: wine in the summer occasionally    Drug use: No     Comment: Drug use: No IV drug use     {Provider  If there are gaps in the social history shown above, please follow the link to update and then refresh the note Link to Social and Substance History :[id number]}   {Provider  REQUIRED FOR AWV Use the storyboard to review patient history, after sections have been marked as reviewed, refresh note to capture documentation:487828}  {Provider   REQUIRED AWV use this link to review and update sexual activity history  after section has been marked as reviewed, refresh note to capture documentation:657354}    Reviewed and updated as needed this visit by Provider   Tobacco  Allergies  Meds  Problems  Med Hx  Surg Hx  Fam Hx  Soc   Hx Sexual Activity                    Current providers sharing in care for this patient include:  Patient Care Team:  Sonia Mitchell DO as PCP - General (Internal Medicine)  Pk Polo DO as Mak Grajeda MD as MD (Orthopaedic Surgery)  Sonia Mitchell DO as Assigned PCP    The following health maintenance items are reviewed in Epic and correct as of today:  Health Maintenance   Topic Date Due    RSV VACCINE (1 - Risk 60-74 years 1-dose series) Never done    BMP  06/10/2025     "INFLUENZA VACCINE (Season Ended) 09/01/2025    LIPID  09/10/2025    TSH W/FREE T4 REFLEX  09/10/2025    MAMMO SCREENING  12/29/2025    MEDICARE ANNUAL WELLNESS VISIT  06/11/2026    FALL RISK ASSESSMENT  06/11/2026    COLORECTAL CANCER SCREENING  11/07/2026    DIABETES SCREENING  06/10/2027    DEXA  07/19/2027    ADVANCE CARE PLANNING  06/11/2030    DTAP/TDAP/TD VACCINE (3 - Td or Tdap) 12/01/2033    HEPATITIS C SCREENING  Completed    PHQ-2 (once per calendar year)  Completed    PNEUMOCOCCAL VACCINE 50+ YEARS  Completed    ZOSTER VACCINE  Completed    HPV VACCINE  Aged Out    MENINGITIS VACCINE  Aged Out    COVID-19 VACCINE  Discontinued      Objective      Exam  /70   Pulse 68   Temp 97.3  F (36.3  C)   Resp 14   Ht 1.638 m (5' 4.5\")   Wt 69.6 kg (153 lb 6.4 oz)   LMP 01/01/1993 (Approximate)   SpO2 99%   BMI 25.92 kg/m           6/11/2025   Mini Cog   Clock Draw Score 2 Normal   3 Item Recall 3 objects recalled   Mini Cog Total Score 5     {A Mini-Cog total score of 0-2 suggests the possibility of dementia, score of 3-5 suggests no dementia:758713}     Signed Electronically by: Sonia Mitchell DO  {Email feedback regarding this note to [email or web address]   :[id number]}  "

## 2025-06-11 NOTE — PROGRESS NOTES
"  {PROVIDER CHARTING PREFERENCE:626298}    Herrera Watson is a 69 year old, presenting for the following health issues:  Medicare Visit      6/11/2025     9:09 AM   Additional Questions   Roomed by CANDY Mccauley     HPI    ***  Failed Oxybutynin, pelvic floor PT, Topical estrogen and AZO    ROS:  CONSTITUTIONAL: Negative for fever, chills, night sweats, significant change in weight  INTEGUMENTARY/SKIN/LYMPH: Negative for worrisome rashes, moles or lesions; swollen lymph nodes - sees Dermatology  EYES: Negative for significant vision changes or irritation  ENT: Negative for additional ear, mouth and throat problems  RESP: Negative for significant cough   CV: Negative for chest pain, palpitations or increased peripheral edema  GI: Negative for abdominal pain, or change in bowel habits or blood in the stools/black stools  : Negative for unusual vaginal symptoms. No vaginal bleeding.  MUSCULOSKELETAL: Negative for new or changing joint pain or significant swelling  NEURO: Negative for new headaches, weakness or paraesthesias  PSYCHIATRIC: Negative for changes in mood or affect; significant anxiety or depression        Objective    /70   Pulse 68   Temp 97.3  F (36.3  C)   Resp 14   Ht 1.638 m (5' 4.5\")   Wt 69.6 kg (153 lb 6.4 oz)   LMP 01/01/1993 (Approximate)   SpO2 99%   BMI 25.92 kg/m    Body mass index is 25.92 kg/m .  Physical Exam   GEN: Vitals reviewed. Healthy appearing. Patient is in no acute distress. Cooperative with exam.  HEENT: Normocephalic atraumatic.  Eyes grossly normal to inspection.  No discharge or erythema, or obvious scleral/conjunctival abnormalities. Oropharynx with no erythema or exudates. Dentition adequate.  EACs clear bilaterally, TM gray with normal landmarks.  NECK: Supple; no thyromegaly or masses noted.  No cervical or supraclavicular lymphadenopathy.  CV: Heart regular in rate and rhythm with no murmur.    LUNGS: No audible wheeze, cough, or visible cyanosis.  No " visible retractions or increased work of breathing.  Lungs clear to auscultation bilaterally.    ABD:  nondistended  SKIN: Warm and dry to touch.  Visible skin clear. No significant rash, abnormal pigmentation or lesions.  EXT: No clubbing or cyanosis.  Trace peripheral edema.  NEURO: Alert and oriented to person, place, and time.  Cranial nerves II-XII grossly intact with no focal or lateralizing deficits.  Muscle tone normal.  Gait normal. No tremor.   MSK: ROM of upper and lower ext symmetric and full.  PSYCH: Mood is good.  Mentation appears normal, affect normal/bright, judgement and insight intact, normal speech and appearance well-groomed.    The longitudinal plan of care for the diagnosis(es)/condition(s) as documented were addressed during this visit. Due to the added complexity in care, I will continue to support Shirley in the subsequent management and with ongoing continuity of care.    Signed Electronically by: Sonia Mitchell DO  {Email feedback regarding this note to [email or web address]   :[id number]}   "history of hyperlipidemia and is taking rosuvastatin.  She denies any obvious side effects.  She does need a refill at this time.  She also has hypothyroidism and is on levothyroxine.  She is due for recheck of her thyroid levels.    She continues on Nexium for her stomach and acid reflux.  She has tolerated this well overall.    She is moving this summer to a new location.  She is stressed with the work of the move.    ROS:  CONSTITUTIONAL: Negative for fever, chills, night sweats, significant change in weight  INTEGUMENTARY/SKIN/LYMPH: Negative for worrisome rashes, moles or lesions; swollen lymph nodes - sees Dermatology  EYES: Negative for significant vision changes or irritation  ENT: Negative for additional ear, mouth and throat problems  RESP: Negative for significant cough   CV: Negative for chest pain, palpitations or increased peripheral edema  GI: Negative for abdominal pain, or change in bowel habits or blood in the stools/black stools  : Negative for unusual vaginal symptoms. No vaginal bleeding.  MUSCULOSKELETAL: Negative for new or changing joint pain or significant swelling  NEURO: Negative for new headaches, weakness or paraesthesias  PSYCHIATRIC: Negative for changes in mood or affect; significant anxiety or depression        Objective    /70   Pulse 68   Temp 97.3  F (36.3  C)   Resp 14   Ht 1.638 m (5' 4.5\")   Wt 69.6 kg (153 lb 6.4 oz)   LMP 01/01/1993 (Approximate)   SpO2 99%   BMI 25.92 kg/m    Body mass index is 25.92 kg/m .  Physical Exam   GEN: Vitals reviewed. Healthy appearing. Patient is in no acute distress. Cooperative with exam.  HEENT: Normocephalic atraumatic.  Eyes grossly normal to inspection.  No discharge or erythema, or obvious scleral/conjunctival abnormalities. Oropharynx with no erythema or exudates. Dentition adequate.  EACs clear bilaterally, TM gray with normal landmarks.  NECK: Supple; no thyromegaly or masses noted.  No cervical or supraclavicular " lymphadenopathy.  CV: Heart regular in rate and rhythm with no murmur.    LUNGS: No audible wheeze, cough, or visible cyanosis.  No visible retractions or increased work of breathing.  Lungs clear to auscultation bilaterally.    ABD:  nondistended  SKIN: Warm and dry to touch.  Visible skin clear. No significant rash, abnormal pigmentation or lesions.  EXT: No clubbing or cyanosis.  Trace peripheral edema.  NEURO: Alert and oriented to person, place, and time.  Cranial nerves II-XII grossly intact with no focal or lateralizing deficits.  Muscle tone normal.  Gait normal. No tremor.   MSK: ROM of upper and lower ext symmetric and full.  PSYCH: Mood is good.  Mentation appears normal, affect normal/bright, judgement and insight intact, normal speech and appearance well-groomed.    The longitudinal plan of care for the diagnosis(es)/condition(s) as documented were addressed during this visit. Due to the added complexity in care, I will continue to support Shirley in the subsequent management and with ongoing continuity of care.    Signed Electronically by: Sonia Mitchell DO

## 2025-06-11 NOTE — PATIENT INSTRUCTIONS
Patient Education   Preventive Care Advice   This is general advice given by our system to help you stay healthy. However, your care team may have specific advice just for you. Please talk to your care team about your preventive care needs.  Nutrition  Eat 5 or more servings of fruits and vegetables each day.  Try wheat bread, brown rice and whole grain pasta (instead of white bread, rice, and pasta).  Get enough calcium and vitamin D. Check the label on foods and aim for 100% of the RDA (recommended daily allowance).  Lifestyle  Exercise at least 150 minutes each week  (30 minutes a day, 5 days a week).  Do muscle strengthening activities 2 days a week. These help control your weight and prevent disease.  No smoking.  Wear sunscreen to prevent skin cancer.  Have a dental exam and cleaning every 6 months.  Yearly exams  See your health care team every year to talk about:  Any changes in your health.  Any medicines your care team has prescribed.  Preventive care, family planning, and ways to prevent chronic diseases.  Shots (vaccines)   HPV shots (up to age 26), if you've never had them before.  Hepatitis B shots (up to age 59), if you've never had them before.  COVID-19 shot: Get this shot when it's due.  Flu shot: Get a flu shot every year.  Tetanus shot: Get a tetanus shot every 10 years.  Pneumococcal, hepatitis A, and RSV shots: Ask your care team if you need these based on your risk.  Shingles shot (for age 50 and up)  General health tests  Diabetes screening:  Starting at age 35, Get screened for diabetes at least every 3 years.  If you are younger than age 35, ask your care team if you should be screened for diabetes.  Cholesterol test: At age 39, start having a cholesterol test every 5 years, or more often if advised.  Bone density scan (DEXA): At age 50, ask your care team if you should have this scan for osteoporosis (brittle bones).  Hepatitis C: Get tested at least once in your life.  STIs (sexually  transmitted infections)  Before age 24: Ask your care team if you should be screened for STIs.  After age 24: Get screened for STIs if you're at risk. You are at risk for STIs (including HIV) if:  You are sexually active with more than one person.  You don't use condoms every time.  You or a partner was diagnosed with a sexually transmitted infection.  If you are at risk for HIV, ask about PrEP medicine to prevent HIV.  Get tested for HIV at least once in your life, whether you are at risk for HIV or not.  Cancer screening tests  Cervical cancer screening: If you have a cervix, begin getting regular cervical cancer screening tests starting at age 21.  Breast cancer scan (mammogram): If you've ever had breasts, begin having regular mammograms starting at age 40. This is a scan to check for breast cancer.  Colon cancer screening: It is important to start screening for colon cancer at age 45.  Have a colonoscopy test every 10 years (or more often if you're at risk) Or, ask your provider about stool tests like a FIT test every year or Cologuard test every 3 years.  To learn more about your testing options, visit:   .  For help making a decision, visit:   https://bit.ly/vu64277.  Prostate cancer screening test: If you have a prostate, ask your care team if a prostate cancer screening test (PSA) at age 55 is right for you.  Lung cancer screening: If you are a current or former smoker ages 50 to 80, ask your care team if ongoing lung cancer screenings are right for you.  For informational purposes only. Not to replace the advice of your health care provider. Copyright   2023 [facility]. All rights reserved. Clinically reviewed by the [facility]. [employer] 365122 - REV 01/24.  Learning About Stress  What is stress?     Stress is your body's response to a hard situation. Your body can have a physical, emotional, or mental response. Stress is a fact of life for most people, and it  affects everyone differently. What causes stress for you may not be stressful for someone else.  A lot of things can cause stress. You may feel stress when you go on a job interview, take a test, or run a race. This kind of short-term stress is normal and even useful. It can help you if you need to work hard or react quickly. For example, stress can help you finish an important job on time.  Long-term stress is caused by ongoing stressful situations or events. Examples of long-term stress include long-term health problems, ongoing problems at work, or conflicts in your family. Long-term stress can harm your health.  How does stress affect your health?  When you are stressed, your body responds as though you are in danger. It makes hormones that speed up your heart, make you breathe faster, and give you a burst of energy. This is called the fight-or-flight stress response. If the stress is over quickly, your body goes back to normal and no harm is done.  But if stress happens too often or lasts too long, it can have bad effects. Long-term stress can make you more likely to get sick, and it can make symptoms of some diseases worse. If you tense up when you are stressed, you may develop neck, shoulder, or low back pain. Stress is linked to high blood pressure and heart disease.  Stress also harms your emotional health. It can make you menendez, tense, or depressed. Your relationships may suffer, and you may not do well at work or school.  What can you do to manage stress?  You can try these things to help manage stress:   Do something active. Exercise or activity can help reduce stress. Walking is a great way to get started. Even everyday activities such as housecleaning or yard work can help.  Try yoga or mena chi. These techniques combine exercise and meditation. You may need some training at first to learn them.  Do something you enjoy. For example, listen to music or go to a movie. Practice your hobby or do volunteer  "work.  Meditate. This can help you relax, because you are not worrying about what happened before or what may happen in the future.  Do guided imagery. Imagine yourself in any setting that helps you feel calm. You can use online videos, books, or a teacher to guide you.  Do breathing exercises. For example:  From a standing position, bend forward from the waist with your knees slightly bent. Let your arms dangle close to the floor.  Breathe in slowly and deeply as you return to a standing position. Roll up slowly and lift your head last.  Hold your breath for just a few seconds in the standing position.  Breathe out slowly and bend forward from the waist.  Let your feelings out. Talk, laugh, cry, and express anger when you need to. Talking with supportive friends or family, a counselor, or a farzad leader about your feelings is a healthy way to relieve stress. Avoid discussing your feelings with people who make you feel worse.  Write. It may help to write about things that are bothering you. This helps you find out how much stress you feel and what is causing it. When you know this, you can find better ways to cope.  What can you do to prevent stress?  You might try some of these things to help prevent stress:  Manage your time. This helps you find time to do the things you want and need to do.  Get enough sleep. Your body recovers from the stresses of the day while you are sleeping.  Get support. Your family, friends, and community can make a difference in how you experience stress.  Limit your news feed. Avoid or limit time on social media or news that may make you feel stressed.  Do something active. Exercise or activity can help reduce stress. Walking is a great way to get started.  Where can you learn more?  Go to https://www.[employer].net/patiented  Enter N032 in the search box to learn more about \"Learning About Stress.\"  Current as of: October 24, 2024  Content Version: 14.5 2024-2025 [employer]   Care instructions adapted under license by your healthcare professional. If you have questions about a medical condition or this instruction, always ask your healthcare professional. [employer] disclaims any warranty or liability for your use of this information.    Bladder Training: Care Instructions  Your Care Instructions     Bladder training is used to treat urge incontinence and stress incontinence. Urge incontinence means that the need to urinate comes on so fast that you can't get to a toilet in time. Stress incontinence means that you leak urine because of pressure on your bladder. For example, it may happen when you laugh, cough, or lift something heavy.  Bladder training can increase how long you can wait before you have to urinate. It can also help your bladder hold more urine. And it can give you better control over the urge to urinate.  It is important to remember that bladder training takes a few weeks to a few months to make a difference. You may not see results right away, but don't give up.  Follow-up care is a key part of your treatment and safety. Be sure to make and go to all appointments, and call your doctor if you are having problems. It's also a good idea to know your test results and keep a list of the medicines you take.  How can you care for yourself at home?  Work with your doctor to come up with a bladder training program that is right for you. You may use one or more of the following methods.  Delayed urination  In the beginning, try to keep from urinating for 5 minutes after you first feel the need to go.  While you wait, take deep, slow breaths to relax. Kegel exercises can also help you delay the need to go to the bathroom.  After some practice, when you can easily wait 5 minutes to urinate, try to wait 10 minutes before you urinate.  Slowly increase the waiting period until you are able to control when you have to urinate.  Scheduled urination  Empty your bladder  "when you first wake up in the morning.  Schedule times throughout the day when you will urinate.  Start by going to the bathroom every hour, even if you don't need to go.  Slowly increase the time between trips to the bathroom.  When you have found a schedule that works well for you, keep doing it.  If you wake up during the night and have to urinate, do it. Apply your schedule to waking hours only.  Kegel exercises  These tighten and strengthen pelvic muscles, which can help you control the flow of urine. (If doing these exercises causes pain, stop doing them and talk with your doctor.) To do Kegel exercises:  Squeeze your muscles as if you were trying not to pass gas. Or squeeze your muscles as if you were stopping the flow of urine. Your belly, legs, and buttocks shouldn't move.  Hold the squeeze for 3 seconds, then relax for 5 to 10 seconds.  Start with 3 seconds, then add 1 second each week until you are able to squeeze for 10 seconds.  Repeat the exercise 10 times a session. Do 3 to 8 sessions a day.  When should you call for help?  Watch closely for changes in your health, and be sure to contact your doctor if:    Your incontinence is getting worse.     You do not get better as expected.   Where can you learn more?  Go to https://www.[employer].net/patiented  Enter V684 in the search box to learn more about \"Bladder Training: Care Instructions.\"  Current as of: April 30, 2024  Content Version: 14.5 2024-2025 [employer].   Care instructions adapted under license by your healthcare professional. If you have questions about a medical condition or this instruction, always ask your healthcare professional. [employer] disclaims any warranty or liability for your use of this information.       "

## 2025-06-17 RX ORDER — LEVOTHYROXINE SODIUM 100 UG/1
100 TABLET ORAL
Qty: 36 TABLET | Refills: 4 | Status: SHIPPED | OUTPATIENT
Start: 2025-06-17

## 2025-06-17 RX ORDER — LEVOTHYROXINE SODIUM 112 UG/1
112 TABLET ORAL
Qty: 48 TABLET | Refills: 4 | Status: SHIPPED | OUTPATIENT
Start: 2025-06-17

## 2025-06-20 ENCOUNTER — HOSPITAL ENCOUNTER (OUTPATIENT)
Dept: RESPIRATORY THERAPY | Facility: OTHER | Age: 70
Discharge: HOME | End: 2025-06-20
Attending: INTERNAL MEDICINE
Payer: MEDICARE

## 2025-06-20 DIAGNOSIS — R06.09 DOE (DYSPNEA ON EXERTION): ICD-10-CM

## 2025-06-20 PROCEDURE — 94726 PLETHYSMOGRAPHY LUNG VOLUMES: CPT

## 2025-06-20 PROCEDURE — 94010 BREATHING CAPACITY TEST: CPT

## 2025-06-20 PROCEDURE — 94729 DIFFUSING CAPACITY: CPT

## 2025-06-20 PROCEDURE — 999N000157 HC STATISTIC RCP TIME EA 10 MIN

## 2025-08-13 DIAGNOSIS — K21.9 GASTROESOPHAGEAL REFLUX DISEASE WITHOUT ESOPHAGITIS: ICD-10-CM

## 2025-08-13 RX ORDER — ESOMEPRAZOLE MAGNESIUM 40 MG/1
CAPSULE, DELAYED RELEASE ORAL
Qty: 90 CAPSULE | Refills: 0 | OUTPATIENT
Start: 2025-08-13

## 2025-08-20 DIAGNOSIS — E78.5 HYPERLIPIDEMIA, UNSPECIFIED HYPERLIPIDEMIA TYPE: ICD-10-CM

## 2025-08-21 RX ORDER — ROSUVASTATIN CALCIUM 5 MG/1
5 TABLET, COATED ORAL DAILY
Qty: 90 TABLET | Refills: 0 | OUTPATIENT
Start: 2025-08-21

## (undated) RX ORDER — LIDOCAINE HYDROCHLORIDE 10 MG/ML
INJECTION, SOLUTION INFILTRATION; PERINEURAL
Status: DISPENSED
Start: 2024-11-25

## (undated) RX ORDER — BUPIVACAINE HYDROCHLORIDE 5 MG/ML
INJECTION, SOLUTION EPIDURAL; INTRACAUDAL
Status: DISPENSED
Start: 2024-11-25

## (undated) RX ORDER — CLONAZEPAM 0.5 MG/1
TABLET ORAL
Status: DISPENSED
Start: 2021-12-28

## (undated) RX ORDER — NITROGLYCERIN 0.4 MG/1
TABLET SUBLINGUAL
Status: DISPENSED
Start: 2024-10-25

## (undated) RX ORDER — TRIAMCINOLONE ACETONIDE 40 MG/ML
INJECTION, SUSPENSION INTRA-ARTICULAR; INTRAMUSCULAR
Status: DISPENSED
Start: 2024-11-25

## (undated) RX ORDER — AMLODIPINE BESYLATE 5 MG/1
TABLET ORAL
Status: DISPENSED
Start: 2021-12-28

## (undated) RX ORDER — LIDOCAINE HYDROCHLORIDE 10 MG/ML
INJECTION, SOLUTION INFILTRATION; PERINEURAL
Status: DISPENSED
Start: 2020-10-23

## (undated) RX ORDER — TRIAMCINOLONE ACETONIDE 40 MG/ML
INJECTION, SUSPENSION INTRA-ARTICULAR; INTRAMUSCULAR
Status: DISPENSED
Start: 2020-10-23

## (undated) RX ORDER — METOPROLOL TARTRATE 100 MG/1
TABLET ORAL
Status: DISPENSED
Start: 2024-10-25

## (undated) RX ORDER — BUPIVACAINE HYDROCHLORIDE 5 MG/ML
INJECTION, SOLUTION EPIDURAL; INTRACAUDAL
Status: DISPENSED
Start: 2020-10-23

## (undated) RX ORDER — METOPROLOL TARTRATE 1 MG/ML
INJECTION, SOLUTION INTRAVENOUS
Status: DISPENSED
Start: 2024-10-25